# Patient Record
Sex: FEMALE | Race: OTHER | HISPANIC OR LATINO | URBAN - METROPOLITAN AREA
[De-identification: names, ages, dates, MRNs, and addresses within clinical notes are randomized per-mention and may not be internally consistent; named-entity substitution may affect disease eponyms.]

---

## 2023-11-01 ENCOUNTER — INPATIENT (INPATIENT)
Facility: HOSPITAL | Age: 23
LOS: 2 days | Discharge: HOME | DRG: 831 | End: 2023-11-04
Attending: OBSTETRICS & GYNECOLOGY | Admitting: OBSTETRICS & GYNECOLOGY
Payer: MEDICAID

## 2023-11-01 VITALS — WEIGHT: 218.92 LBS | HEIGHT: 64 IN

## 2023-11-01 DIAGNOSIS — O26.899 OTHER SPECIFIED PREGNANCY RELATED CONDITIONS, UNSPECIFIED TRIMESTER: ICD-10-CM

## 2023-11-01 DIAGNOSIS — Z3A.00 WEEKS OF GESTATION OF PREGNANCY NOT SPECIFIED: ICD-10-CM

## 2023-11-01 DIAGNOSIS — Z34.80 ENCOUNTER FOR SUPERVISION OF OTHER NORMAL PREGNANCY, UNSPECIFIED TRIMESTER: ICD-10-CM

## 2023-11-01 LAB
ALBUMIN SERPL ELPH-MCNC: 2.8 G/DL — LOW (ref 3.5–5)
ALBUMIN SERPL ELPH-MCNC: 2.8 G/DL — LOW (ref 3.5–5)
ALP SERPL-CCNC: 217 U/L — HIGH (ref 40–120)
ALP SERPL-CCNC: 217 U/L — HIGH (ref 40–120)
ALT FLD-CCNC: 68 U/L DA — HIGH (ref 10–60)
ALT FLD-CCNC: 68 U/L DA — HIGH (ref 10–60)
AMYLASE P1 CFR SERPL: 1265 U/L — HIGH (ref 25–115)
AMYLASE P1 CFR SERPL: 1265 U/L — HIGH (ref 25–115)
AMYLASE P1 CFR SERPL: >1302 U/L — HIGH (ref 25–115)
AMYLASE P1 CFR SERPL: >1302 U/L — HIGH (ref 25–115)
ANION GAP SERPL CALC-SCNC: 6 MMOL/L — SIGNIFICANT CHANGE UP (ref 5–17)
ANION GAP SERPL CALC-SCNC: 6 MMOL/L — SIGNIFICANT CHANGE UP (ref 5–17)
APTT BLD: 31 SEC — SIGNIFICANT CHANGE UP (ref 24.5–35.6)
APTT BLD: 31 SEC — SIGNIFICANT CHANGE UP (ref 24.5–35.6)
AST SERPL-CCNC: 60 U/L — HIGH (ref 10–40)
AST SERPL-CCNC: 60 U/L — HIGH (ref 10–40)
BASOPHILS # BLD AUTO: 0.01 K/UL — SIGNIFICANT CHANGE UP (ref 0–0.2)
BASOPHILS # BLD AUTO: 0.01 K/UL — SIGNIFICANT CHANGE UP (ref 0–0.2)
BASOPHILS # BLD AUTO: 0.02 K/UL — SIGNIFICANT CHANGE UP (ref 0–0.2)
BASOPHILS # BLD AUTO: 0.02 K/UL — SIGNIFICANT CHANGE UP (ref 0–0.2)
BASOPHILS NFR BLD AUTO: 0.1 % — SIGNIFICANT CHANGE UP (ref 0–2)
BASOPHILS NFR BLD AUTO: 0.1 % — SIGNIFICANT CHANGE UP (ref 0–2)
BASOPHILS NFR BLD AUTO: 0.2 % — SIGNIFICANT CHANGE UP (ref 0–2)
BASOPHILS NFR BLD AUTO: 0.2 % — SIGNIFICANT CHANGE UP (ref 0–2)
BILIRUB SERPL-MCNC: 0.8 MG/DL — SIGNIFICANT CHANGE UP (ref 0.2–1.2)
BILIRUB SERPL-MCNC: 0.8 MG/DL — SIGNIFICANT CHANGE UP (ref 0.2–1.2)
BUN SERPL-MCNC: 8 MG/DL — SIGNIFICANT CHANGE UP (ref 7–18)
BUN SERPL-MCNC: 8 MG/DL — SIGNIFICANT CHANGE UP (ref 7–18)
CALCIUM SERPL-MCNC: 9.2 MG/DL — SIGNIFICANT CHANGE UP (ref 8.4–10.5)
CALCIUM SERPL-MCNC: 9.2 MG/DL — SIGNIFICANT CHANGE UP (ref 8.4–10.5)
CHLORIDE SERPL-SCNC: 109 MMOL/L — HIGH (ref 96–108)
CHLORIDE SERPL-SCNC: 109 MMOL/L — HIGH (ref 96–108)
CO2 SERPL-SCNC: 24 MMOL/L — SIGNIFICANT CHANGE UP (ref 22–31)
CO2 SERPL-SCNC: 24 MMOL/L — SIGNIFICANT CHANGE UP (ref 22–31)
CREAT SERPL-MCNC: 0.49 MG/DL — LOW (ref 0.5–1.3)
CREAT SERPL-MCNC: 0.49 MG/DL — LOW (ref 0.5–1.3)
EGFR: 136 ML/MIN/1.73M2 — SIGNIFICANT CHANGE UP
EGFR: 136 ML/MIN/1.73M2 — SIGNIFICANT CHANGE UP
EOSINOPHIL # BLD AUTO: 0 K/UL — SIGNIFICANT CHANGE UP (ref 0–0.5)
EOSINOPHIL NFR BLD AUTO: 0 % — SIGNIFICANT CHANGE UP (ref 0–6)
FIBRINOGEN PPP-MCNC: 594 MG/DL — HIGH (ref 200–475)
FIBRINOGEN PPP-MCNC: 594 MG/DL — HIGH (ref 200–475)
GLUCOSE SERPL-MCNC: 125 MG/DL — HIGH (ref 70–99)
GLUCOSE SERPL-MCNC: 125 MG/DL — HIGH (ref 70–99)
HBV SURFACE AG SERPL QL IA: SIGNIFICANT CHANGE UP
HBV SURFACE AG SERPL QL IA: SIGNIFICANT CHANGE UP
HCT VFR BLD CALC: 35 % — SIGNIFICANT CHANGE UP (ref 34.5–45)
HCT VFR BLD CALC: 35 % — SIGNIFICANT CHANGE UP (ref 34.5–45)
HCT VFR BLD CALC: 39.1 % — SIGNIFICANT CHANGE UP (ref 34.5–45)
HCT VFR BLD CALC: 39.1 % — SIGNIFICANT CHANGE UP (ref 34.5–45)
HGB BLD-MCNC: 11.8 G/DL — SIGNIFICANT CHANGE UP (ref 11.5–15.5)
HGB BLD-MCNC: 11.8 G/DL — SIGNIFICANT CHANGE UP (ref 11.5–15.5)
HGB BLD-MCNC: 13.5 G/DL — SIGNIFICANT CHANGE UP (ref 11.5–15.5)
HGB BLD-MCNC: 13.5 G/DL — SIGNIFICANT CHANGE UP (ref 11.5–15.5)
HIV 1 & 2 AB SERPL IA.RAPID: SIGNIFICANT CHANGE UP
HIV 1 & 2 AB SERPL IA.RAPID: SIGNIFICANT CHANGE UP
IMM GRANULOCYTES NFR BLD AUTO: 0.3 % — SIGNIFICANT CHANGE UP (ref 0–0.9)
IMM GRANULOCYTES NFR BLD AUTO: 0.3 % — SIGNIFICANT CHANGE UP (ref 0–0.9)
IMM GRANULOCYTES NFR BLD AUTO: 0.6 % — SIGNIFICANT CHANGE UP (ref 0–0.9)
IMM GRANULOCYTES NFR BLD AUTO: 0.6 % — SIGNIFICANT CHANGE UP (ref 0–0.9)
INR BLD: 0.94 RATIO — SIGNIFICANT CHANGE UP (ref 0.85–1.18)
INR BLD: 0.94 RATIO — SIGNIFICANT CHANGE UP (ref 0.85–1.18)
LIDOCAIN IGE QN: 1973 U/L — HIGH (ref 13–75)
LIDOCAIN IGE QN: 1973 U/L — HIGH (ref 13–75)
LIDOCAIN IGE QN: >5000 U/L — HIGH (ref 13–75)
LIDOCAIN IGE QN: >5000 U/L — HIGH (ref 13–75)
LYMPHOCYTES # BLD AUTO: 0.85 K/UL — LOW (ref 1–3.3)
LYMPHOCYTES # BLD AUTO: 0.85 K/UL — LOW (ref 1–3.3)
LYMPHOCYTES # BLD AUTO: 1.02 K/UL — SIGNIFICANT CHANGE UP (ref 1–3.3)
LYMPHOCYTES # BLD AUTO: 1.02 K/UL — SIGNIFICANT CHANGE UP (ref 1–3.3)
LYMPHOCYTES # BLD AUTO: 9.5 % — LOW (ref 13–44)
LYMPHOCYTES # BLD AUTO: 9.5 % — LOW (ref 13–44)
LYMPHOCYTES # BLD AUTO: 9.9 % — LOW (ref 13–44)
LYMPHOCYTES # BLD AUTO: 9.9 % — LOW (ref 13–44)
MCHC RBC-ENTMCNC: 27.6 PG — SIGNIFICANT CHANGE UP (ref 27–34)
MCHC RBC-ENTMCNC: 27.6 PG — SIGNIFICANT CHANGE UP (ref 27–34)
MCHC RBC-ENTMCNC: 28 PG — SIGNIFICANT CHANGE UP (ref 27–34)
MCHC RBC-ENTMCNC: 28 PG — SIGNIFICANT CHANGE UP (ref 27–34)
MCHC RBC-ENTMCNC: 33.7 GM/DL — SIGNIFICANT CHANGE UP (ref 32–36)
MCHC RBC-ENTMCNC: 33.7 GM/DL — SIGNIFICANT CHANGE UP (ref 32–36)
MCHC RBC-ENTMCNC: 34.5 GM/DL — SIGNIFICANT CHANGE UP (ref 32–36)
MCHC RBC-ENTMCNC: 34.5 GM/DL — SIGNIFICANT CHANGE UP (ref 32–36)
MCV RBC AUTO: 81 FL — SIGNIFICANT CHANGE UP (ref 80–100)
MCV RBC AUTO: 81 FL — SIGNIFICANT CHANGE UP (ref 80–100)
MCV RBC AUTO: 81.8 FL — SIGNIFICANT CHANGE UP (ref 80–100)
MCV RBC AUTO: 81.8 FL — SIGNIFICANT CHANGE UP (ref 80–100)
MONOCYTES # BLD AUTO: 0.34 K/UL — SIGNIFICANT CHANGE UP (ref 0–0.9)
MONOCYTES # BLD AUTO: 0.34 K/UL — SIGNIFICANT CHANGE UP (ref 0–0.9)
MONOCYTES # BLD AUTO: 0.47 K/UL — SIGNIFICANT CHANGE UP (ref 0–0.9)
MONOCYTES # BLD AUTO: 0.47 K/UL — SIGNIFICANT CHANGE UP (ref 0–0.9)
MONOCYTES NFR BLD AUTO: 4 % — SIGNIFICANT CHANGE UP (ref 2–14)
MONOCYTES NFR BLD AUTO: 4 % — SIGNIFICANT CHANGE UP (ref 2–14)
MONOCYTES NFR BLD AUTO: 4.4 % — SIGNIFICANT CHANGE UP (ref 2–14)
MONOCYTES NFR BLD AUTO: 4.4 % — SIGNIFICANT CHANGE UP (ref 2–14)
NEUTROPHILS # BLD AUTO: 7.37 K/UL — SIGNIFICANT CHANGE UP (ref 1.8–7.4)
NEUTROPHILS # BLD AUTO: 7.37 K/UL — SIGNIFICANT CHANGE UP (ref 1.8–7.4)
NEUTROPHILS # BLD AUTO: 9.2 K/UL — HIGH (ref 1.8–7.4)
NEUTROPHILS # BLD AUTO: 9.2 K/UL — HIGH (ref 1.8–7.4)
NEUTROPHILS NFR BLD AUTO: 85.3 % — HIGH (ref 43–77)
NEUTROPHILS NFR BLD AUTO: 85.3 % — HIGH (ref 43–77)
NEUTROPHILS NFR BLD AUTO: 85.7 % — HIGH (ref 43–77)
NEUTROPHILS NFR BLD AUTO: 85.7 % — HIGH (ref 43–77)
NRBC # BLD: 0 /100 WBCS — SIGNIFICANT CHANGE UP (ref 0–0)
PLATELET # BLD AUTO: 264 K/UL — SIGNIFICANT CHANGE UP (ref 150–400)
PLATELET # BLD AUTO: 264 K/UL — SIGNIFICANT CHANGE UP (ref 150–400)
PLATELET # BLD AUTO: 290 K/UL — SIGNIFICANT CHANGE UP (ref 150–400)
PLATELET # BLD AUTO: 290 K/UL — SIGNIFICANT CHANGE UP (ref 150–400)
POTASSIUM SERPL-MCNC: 4 MMOL/L — SIGNIFICANT CHANGE UP (ref 3.5–5.3)
POTASSIUM SERPL-MCNC: 4 MMOL/L — SIGNIFICANT CHANGE UP (ref 3.5–5.3)
POTASSIUM SERPL-SCNC: 4 MMOL/L — SIGNIFICANT CHANGE UP (ref 3.5–5.3)
POTASSIUM SERPL-SCNC: 4 MMOL/L — SIGNIFICANT CHANGE UP (ref 3.5–5.3)
PROT SERPL-MCNC: 7.5 G/DL — SIGNIFICANT CHANGE UP (ref 6–8.3)
PROT SERPL-MCNC: 7.5 G/DL — SIGNIFICANT CHANGE UP (ref 6–8.3)
PROTHROM AB SERPL-ACNC: 10.7 SEC — SIGNIFICANT CHANGE UP (ref 9.5–13)
PROTHROM AB SERPL-ACNC: 10.7 SEC — SIGNIFICANT CHANGE UP (ref 9.5–13)
RBC # BLD: 4.28 M/UL — SIGNIFICANT CHANGE UP (ref 3.8–5.2)
RBC # BLD: 4.28 M/UL — SIGNIFICANT CHANGE UP (ref 3.8–5.2)
RBC # BLD: 4.83 M/UL — SIGNIFICANT CHANGE UP (ref 3.8–5.2)
RBC # BLD: 4.83 M/UL — SIGNIFICANT CHANGE UP (ref 3.8–5.2)
RBC # FLD: 13.7 % — SIGNIFICANT CHANGE UP (ref 10.3–14.5)
RBC # FLD: 13.7 % — SIGNIFICANT CHANGE UP (ref 10.3–14.5)
RBC # FLD: 13.8 % — SIGNIFICANT CHANGE UP (ref 10.3–14.5)
RBC # FLD: 13.8 % — SIGNIFICANT CHANGE UP (ref 10.3–14.5)
SODIUM SERPL-SCNC: 139 MMOL/L — SIGNIFICANT CHANGE UP (ref 135–145)
SODIUM SERPL-SCNC: 139 MMOL/L — SIGNIFICANT CHANGE UP (ref 135–145)
WBC # BLD: 10.77 K/UL — HIGH (ref 3.8–10.5)
WBC # BLD: 10.77 K/UL — HIGH (ref 3.8–10.5)
WBC # BLD: 8.6 K/UL — SIGNIFICANT CHANGE UP (ref 3.8–10.5)
WBC # BLD: 8.6 K/UL — SIGNIFICANT CHANGE UP (ref 3.8–10.5)
WBC # FLD AUTO: 10.77 K/UL — HIGH (ref 3.8–10.5)
WBC # FLD AUTO: 10.77 K/UL — HIGH (ref 3.8–10.5)
WBC # FLD AUTO: 8.6 K/UL — SIGNIFICANT CHANGE UP (ref 3.8–10.5)
WBC # FLD AUTO: 8.6 K/UL — SIGNIFICANT CHANGE UP (ref 3.8–10.5)

## 2023-11-01 PROCEDURE — 76818 FETAL BIOPHYS PROFILE W/NST: CPT | Mod: 26

## 2023-11-01 PROCEDURE — 76815 OB US LIMITED FETUS(S): CPT | Mod: 26

## 2023-11-01 PROCEDURE — 76705 ECHO EXAM OF ABDOMEN: CPT | Mod: 26

## 2023-11-01 RX ORDER — SODIUM CHLORIDE 9 MG/ML
1000 INJECTION, SOLUTION INTRAVENOUS ONCE
Refills: 0 | Status: COMPLETED | OUTPATIENT
Start: 2023-11-01 | End: 2023-11-01

## 2023-11-01 RX ORDER — MORPHINE SULFATE 50 MG/1
4 CAPSULE, EXTENDED RELEASE ORAL ONCE
Refills: 0 | Status: DISCONTINUED | OUTPATIENT
Start: 2023-11-01 | End: 2023-11-03

## 2023-11-01 RX ORDER — ONDANSETRON 8 MG/1
4 TABLET, FILM COATED ORAL ONCE
Refills: 0 | Status: DISCONTINUED | OUTPATIENT
Start: 2023-11-01 | End: 2023-11-02

## 2023-11-01 RX ORDER — ACETAMINOPHEN 500 MG
1000 TABLET ORAL ONCE
Refills: 0 | Status: COMPLETED | OUTPATIENT
Start: 2023-11-01 | End: 2023-11-01

## 2023-11-01 RX ORDER — ONDANSETRON 8 MG/1
4 TABLET, FILM COATED ORAL ONCE
Refills: 0 | Status: COMPLETED | OUTPATIENT
Start: 2023-11-01 | End: 2023-11-01

## 2023-11-01 RX ORDER — CHLORHEXIDINE GLUCONATE 213 G/1000ML
1 SOLUTION TOPICAL DAILY
Refills: 0 | Status: DISCONTINUED | OUTPATIENT
Start: 2023-11-01 | End: 2023-11-01

## 2023-11-01 RX ORDER — SODIUM CHLORIDE 9 MG/ML
1000 INJECTION, SOLUTION INTRAVENOUS
Refills: 0 | Status: DISCONTINUED | OUTPATIENT
Start: 2023-11-01 | End: 2023-11-01

## 2023-11-01 RX ADMIN — ONDANSETRON 4 MILLIGRAM(S): 8 TABLET, FILM COATED ORAL at 14:30

## 2023-11-01 RX ADMIN — SODIUM CHLORIDE 150 MILLILITER(S): 9 INJECTION, SOLUTION INTRAVENOUS at 21:00

## 2023-11-01 RX ADMIN — Medication 1000 MILLIGRAM(S): at 15:00

## 2023-11-01 RX ADMIN — SODIUM CHLORIDE 2000 MILLILITER(S): 9 INJECTION, SOLUTION INTRAVENOUS at 14:05

## 2023-11-01 RX ADMIN — SODIUM CHLORIDE 1000 MILLILITER(S): 9 INJECTION, SOLUTION INTRAVENOUS at 19:50

## 2023-11-01 RX ADMIN — Medication 400 MILLIGRAM(S): at 14:30

## 2023-11-01 NOTE — CHART NOTE - NSCHARTNOTEFT_GEN_A_CORE
[facility] Telephone Brief Consult Note:    Patient is a 24 YO  at 34w1d with known gallstones (diagnosed one month ago) presenting with nausea/vomiting and abdominal pain and found to have gallstone pancreatitis. Per provider report, nausea/vomiting and abdominal pain improved. Normotensive and nontachycardic. Labs notable for amylase >1302, lipase >5000, and AST/ALT 60/68. Abdominal ultrasound demonstrates cholelithiasis but no cholecystitis. Patient seen by general surgery and no indication for surgical intervention at this time. Category I fetal heart tracing (baseline 125 bpm, moderate variability, positive accelerations, no decelerations) and contractions q3-4 minutes on tocometry. SVE Long/closed on initial exam at 2:30 PM    Recommend:  -Repeat cervical exam to evaluate for  labor in setting of  contractions  -Given symptomatic improvement and clinical stability, defer betamethasone at this time  -NPO  -IVF hydration: bolus total of 10 ml/kg and then maintenance of 1.5 mL/kg/hr  -Morphine as needed for pain  -GI or Medicine consult  -Continuous EFM/[place]  -Repeat CBC, CMP, amylase and lipase at 7:30 PM    Dixie Ball MD  [facility] Fellow  D/w Dr. Marie [facility] Telephone Brief Consult Note:    Patient is a 24 YO  at 34w1d with known gallstones (diagnosed one month ago) presenting with nausea/vomiting and abdominal pain and found to have gallstone pancreatitis. Per provider report, nausea/vomiting and abdominal pain improved. Normotensive and nontachycardic. Labs notable for amylase >1302, lipase >5000, and AST/ALT 60/68. Abdominal ultrasound demonstrates cholelithiasis but no cholecystitis. Patient seen by general surgery and no indication for surgical intervention at this time. Category I fetal heart tracing (baseline 125 bpm, moderate variability, positive accelerations, no decelerations) and contractions q3-4 minutes on tocometry. SVE Long/closed on initial exam at 2:30 PM    Recommend:  -Repeat cervical exam to evaluate for  labor in setting of  contractions  -Given symptomatic improvement and clinical stability, defer betamethasone at this time  -NPO  -IVF hydration: bolus total of 10 ml/kg and then maintenance of 1.5 mL/kg/hr  -Morphine as needed for pain  -GI or Medicine consult  -Continuous EFM/[place]  -Repeat CBC, CMP, amylase and lipase at 7:30 PM    Dixie Ball MD  [facility] Fellow  D/w Dr. Marie    Addendum 9:45 PM:  Per provider, patient without complaints and reports hunger. Vital signs within normal limits. Labs improving amylase >1302 >>1265, lipase >5000 >> 1973, and AST/ALT 60/68>>49/66. Plan as above with following changes: switch to BID NST, repeat CBC/CMP/amylase/lipase in the morning.     Dixie Ball MD  M Fellow [facility] Telephone Brief Consult Note:    Patient is a 24 YO  at 34w1d with known gallstones (diagnosed one month ago) presenting with nausea/vomiting and abdominal pain and found to have gallstone pancreatitis. Per provider report, nausea/vomiting and abdominal pain improved. Normotensive and nontachycardic. Labs notable for amylase >1302, lipase >5000, and AST/ALT 60/68. Abdominal ultrasound demonstrates cholelithiasis but no cholecystitis. Patient seen by general surgery and no indication for surgical intervention at this time. Category I fetal heart tracing (baseline 125 bpm, moderate variability, positive accelerations, no decelerations) and contractions q3-4 minutes on tocometry. SVE Long/closed on initial exam at 2:30 PM    Recommend:  -Repeat cervical exam to evaluate for  labor in setting of  contractions  -Given symptomatic improvement and clinical stability, defer betamethasone at this time  -NPO  -IVF hydration: bolus total of 10 ml/kg and then maintenance of 1.5 mL/kg/hr  -Morphine as needed for pain  -GI or Medicine consult  -Continuous EFM/[place]  -Repeat CBC, CMP, amylase and lipase at 7:30 PM    Dixie Ball MD  [facility] Fellow  D/w Dr. Marie    Addendum 9:45 PM:  Per provider, patient without complaints and reports hunger. Vital signs within normal limits. Labs improving amylase >1302 >>1265, lipase >5000 >> 1973, and AST/ALT 60/68>>49/66. Plan as above with following changes: switch to BID NST, repeat CBC/CMP/amylase/lipase in the morning.     Dixie Ball MD  [facility] Fellow    [facility] ATTENDING:   Agree with the note above by Dr. Ball. Patient with suspected transient gallstone pancreatitis, now with clinical improvement likely in the setting of a passed stone. To continue supportive care with fluid management with GI following.   SHANTEL Marie MD

## 2023-11-02 DIAGNOSIS — K85.10 BILIARY ACUTE PANCREATITIS WITHOUT NECROSIS OR INFECTION: ICD-10-CM

## 2023-11-02 LAB
ALBUMIN SERPL ELPH-MCNC: 2.2 G/DL — LOW (ref 3.5–5)
ALBUMIN SERPL ELPH-MCNC: 2.2 G/DL — LOW (ref 3.5–5)
ALP SERPL-CCNC: 168 U/L — HIGH (ref 40–120)
ALP SERPL-CCNC: 168 U/L — HIGH (ref 40–120)
ALT FLD-CCNC: 59 U/L DA — SIGNIFICANT CHANGE UP (ref 10–60)
ALT FLD-CCNC: 59 U/L DA — SIGNIFICANT CHANGE UP (ref 10–60)
AMYLASE P1 CFR SERPL: 419 U/L — HIGH (ref 25–115)
AMYLASE P1 CFR SERPL: 419 U/L — HIGH (ref 25–115)
ANION GAP SERPL CALC-SCNC: 9 MMOL/L — SIGNIFICANT CHANGE UP (ref 5–17)
ANION GAP SERPL CALC-SCNC: 9 MMOL/L — SIGNIFICANT CHANGE UP (ref 5–17)
AST SERPL-CCNC: 40 U/L — SIGNIFICANT CHANGE UP (ref 10–40)
AST SERPL-CCNC: 40 U/L — SIGNIFICANT CHANGE UP (ref 10–40)
BASOPHILS # BLD AUTO: 0.03 K/UL — SIGNIFICANT CHANGE UP (ref 0–0.2)
BASOPHILS # BLD AUTO: 0.03 K/UL — SIGNIFICANT CHANGE UP (ref 0–0.2)
BASOPHILS NFR BLD AUTO: 0.5 % — SIGNIFICANT CHANGE UP (ref 0–2)
BASOPHILS NFR BLD AUTO: 0.5 % — SIGNIFICANT CHANGE UP (ref 0–2)
BILIRUB SERPL-MCNC: 0.5 MG/DL — SIGNIFICANT CHANGE UP (ref 0.2–1.2)
BILIRUB SERPL-MCNC: 0.5 MG/DL — SIGNIFICANT CHANGE UP (ref 0.2–1.2)
BUN SERPL-MCNC: 7 MG/DL — SIGNIFICANT CHANGE UP (ref 7–18)
BUN SERPL-MCNC: 7 MG/DL — SIGNIFICANT CHANGE UP (ref 7–18)
CALCIUM SERPL-MCNC: 8.2 MG/DL — LOW (ref 8.4–10.5)
CALCIUM SERPL-MCNC: 8.2 MG/DL — LOW (ref 8.4–10.5)
CHLORIDE SERPL-SCNC: 106 MMOL/L — SIGNIFICANT CHANGE UP (ref 96–108)
CHLORIDE SERPL-SCNC: 106 MMOL/L — SIGNIFICANT CHANGE UP (ref 96–108)
CO2 SERPL-SCNC: 24 MMOL/L — SIGNIFICANT CHANGE UP (ref 22–31)
CO2 SERPL-SCNC: 24 MMOL/L — SIGNIFICANT CHANGE UP (ref 22–31)
CREAT SERPL-MCNC: 0.37 MG/DL — LOW (ref 0.5–1.3)
CREAT SERPL-MCNC: 0.37 MG/DL — LOW (ref 0.5–1.3)
EGFR: 145 ML/MIN/1.73M2 — SIGNIFICANT CHANGE UP
EGFR: 145 ML/MIN/1.73M2 — SIGNIFICANT CHANGE UP
EOSINOPHIL # BLD AUTO: 0.02 K/UL — SIGNIFICANT CHANGE UP (ref 0–0.5)
EOSINOPHIL # BLD AUTO: 0.02 K/UL — SIGNIFICANT CHANGE UP (ref 0–0.5)
EOSINOPHIL NFR BLD AUTO: 0.3 % — SIGNIFICANT CHANGE UP (ref 0–6)
EOSINOPHIL NFR BLD AUTO: 0.3 % — SIGNIFICANT CHANGE UP (ref 0–6)
GLUCOSE SERPL-MCNC: 81 MG/DL — SIGNIFICANT CHANGE UP (ref 70–99)
GLUCOSE SERPL-MCNC: 81 MG/DL — SIGNIFICANT CHANGE UP (ref 70–99)
HCT VFR BLD CALC: 30.2 % — LOW (ref 34.5–45)
HCT VFR BLD CALC: 30.2 % — LOW (ref 34.5–45)
HGB BLD-MCNC: 10.5 G/DL — LOW (ref 11.5–15.5)
HGB BLD-MCNC: 10.5 G/DL — LOW (ref 11.5–15.5)
HIV 1+2 AB+HIV1 P24 AG SERPL QL IA: SIGNIFICANT CHANGE UP
HIV 1+2 AB+HIV1 P24 AG SERPL QL IA: SIGNIFICANT CHANGE UP
IMM GRANULOCYTES NFR BLD AUTO: 0.2 % — SIGNIFICANT CHANGE UP (ref 0–0.9)
IMM GRANULOCYTES NFR BLD AUTO: 0.2 % — SIGNIFICANT CHANGE UP (ref 0–0.9)
LIDOCAIN IGE QN: 494 U/L — HIGH (ref 13–75)
LIDOCAIN IGE QN: 494 U/L — HIGH (ref 13–75)
LYMPHOCYTES # BLD AUTO: 1.27 K/UL — SIGNIFICANT CHANGE UP (ref 1–3.3)
LYMPHOCYTES # BLD AUTO: 1.27 K/UL — SIGNIFICANT CHANGE UP (ref 1–3.3)
LYMPHOCYTES # BLD AUTO: 21.1 % — SIGNIFICANT CHANGE UP (ref 13–44)
LYMPHOCYTES # BLD AUTO: 21.1 % — SIGNIFICANT CHANGE UP (ref 13–44)
MCHC RBC-ENTMCNC: 27.8 PG — SIGNIFICANT CHANGE UP (ref 27–34)
MCHC RBC-ENTMCNC: 27.8 PG — SIGNIFICANT CHANGE UP (ref 27–34)
MCHC RBC-ENTMCNC: 34.8 GM/DL — SIGNIFICANT CHANGE UP (ref 32–36)
MCHC RBC-ENTMCNC: 34.8 GM/DL — SIGNIFICANT CHANGE UP (ref 32–36)
MCV RBC AUTO: 79.9 FL — LOW (ref 80–100)
MCV RBC AUTO: 79.9 FL — LOW (ref 80–100)
MONOCYTES # BLD AUTO: 0.39 K/UL — SIGNIFICANT CHANGE UP (ref 0–0.9)
MONOCYTES # BLD AUTO: 0.39 K/UL — SIGNIFICANT CHANGE UP (ref 0–0.9)
MONOCYTES NFR BLD AUTO: 6.5 % — SIGNIFICANT CHANGE UP (ref 2–14)
MONOCYTES NFR BLD AUTO: 6.5 % — SIGNIFICANT CHANGE UP (ref 2–14)
NEUTROPHILS # BLD AUTO: 4.29 K/UL — SIGNIFICANT CHANGE UP (ref 1.8–7.4)
NEUTROPHILS # BLD AUTO: 4.29 K/UL — SIGNIFICANT CHANGE UP (ref 1.8–7.4)
NEUTROPHILS NFR BLD AUTO: 71.4 % — SIGNIFICANT CHANGE UP (ref 43–77)
NEUTROPHILS NFR BLD AUTO: 71.4 % — SIGNIFICANT CHANGE UP (ref 43–77)
NRBC # BLD: 0 /100 WBCS — SIGNIFICANT CHANGE UP (ref 0–0)
NRBC # BLD: 0 /100 WBCS — SIGNIFICANT CHANGE UP (ref 0–0)
PLATELET # BLD AUTO: 239 K/UL — SIGNIFICANT CHANGE UP (ref 150–400)
PLATELET # BLD AUTO: 239 K/UL — SIGNIFICANT CHANGE UP (ref 150–400)
POTASSIUM SERPL-MCNC: 3.4 MMOL/L — LOW (ref 3.5–5.3)
POTASSIUM SERPL-MCNC: 3.4 MMOL/L — LOW (ref 3.5–5.3)
POTASSIUM SERPL-SCNC: 3.4 MMOL/L — LOW (ref 3.5–5.3)
POTASSIUM SERPL-SCNC: 3.4 MMOL/L — LOW (ref 3.5–5.3)
PROT SERPL-MCNC: 5.9 G/DL — LOW (ref 6–8.3)
PROT SERPL-MCNC: 5.9 G/DL — LOW (ref 6–8.3)
RBC # BLD: 3.78 M/UL — LOW (ref 3.8–5.2)
RBC # BLD: 3.78 M/UL — LOW (ref 3.8–5.2)
RBC # FLD: 13.8 % — SIGNIFICANT CHANGE UP (ref 10.3–14.5)
RBC # FLD: 13.8 % — SIGNIFICANT CHANGE UP (ref 10.3–14.5)
RUBV IGG SER-ACNC: 2.1 INDEX — SIGNIFICANT CHANGE UP
RUBV IGG SER-ACNC: 2.1 INDEX — SIGNIFICANT CHANGE UP
RUBV IGG SER-IMP: POSITIVE — SIGNIFICANT CHANGE UP
RUBV IGG SER-IMP: POSITIVE — SIGNIFICANT CHANGE UP
SODIUM SERPL-SCNC: 139 MMOL/L — SIGNIFICANT CHANGE UP (ref 135–145)
SODIUM SERPL-SCNC: 139 MMOL/L — SIGNIFICANT CHANGE UP (ref 135–145)
T PALLIDUM AB TITR SER: NEGATIVE — SIGNIFICANT CHANGE UP
T PALLIDUM AB TITR SER: NEGATIVE — SIGNIFICANT CHANGE UP
WBC # BLD: 6.01 K/UL — SIGNIFICANT CHANGE UP (ref 3.8–10.5)
WBC # BLD: 6.01 K/UL — SIGNIFICANT CHANGE UP (ref 3.8–10.5)
WBC # FLD AUTO: 6.01 K/UL — SIGNIFICANT CHANGE UP (ref 3.8–10.5)
WBC # FLD AUTO: 6.01 K/UL — SIGNIFICANT CHANGE UP (ref 3.8–10.5)

## 2023-11-02 RX ORDER — ONDANSETRON 8 MG/1
4 TABLET, FILM COATED ORAL EVERY 4 HOURS
Refills: 0 | Status: DISCONTINUED | OUTPATIENT
Start: 2023-11-02 | End: 2023-11-04

## 2023-11-02 RX ORDER — FAMOTIDINE 10 MG/ML
20 INJECTION INTRAVENOUS
Refills: 0 | Status: DISCONTINUED | OUTPATIENT
Start: 2023-11-02 | End: 2023-11-02

## 2023-11-02 RX ORDER — SODIUM CHLORIDE 9 MG/ML
1000 INJECTION, SOLUTION INTRAVENOUS
Refills: 0 | Status: DISCONTINUED | OUTPATIENT
Start: 2023-11-02 | End: 2023-11-03

## 2023-11-02 RX ORDER — FAMOTIDINE 10 MG/ML
20 INJECTION INTRAVENOUS EVERY 12 HOURS
Refills: 0 | Status: DISCONTINUED | OUTPATIENT
Start: 2023-11-02 | End: 2023-11-04

## 2023-11-02 RX ORDER — POTASSIUM CHLORIDE 20 MEQ
10 PACKET (EA) ORAL
Refills: 0 | Status: COMPLETED | OUTPATIENT
Start: 2023-11-02 | End: 2023-11-02

## 2023-11-02 RX ADMIN — FAMOTIDINE 20 MILLIGRAM(S): 10 INJECTION INTRAVENOUS at 18:00

## 2023-11-02 RX ADMIN — Medication 100 MILLIEQUIVALENT(S): at 13:04

## 2023-11-02 RX ADMIN — SODIUM CHLORIDE 150 MILLILITER(S): 9 INJECTION, SOLUTION INTRAVENOUS at 21:40

## 2023-11-02 RX ADMIN — Medication 100 MILLIEQUIVALENT(S): at 12:00

## 2023-11-02 RX ADMIN — Medication 100 MILLIEQUIVALENT(S): at 15:00

## 2023-11-02 NOTE — PROGRESS NOTE ADULT - SUBJECTIVE AND OBJECTIVE BOX
Pt seen at bedside c/o epigastric pain, persistent but improved since admission. Pt states +fm, no vb, no rom, no ctx, no fever, or chills, no cp; no sob; no n/v/d/c, no h/a, blurry vision,, no dysuria, urgency or frequency. Tolerating regular diet, voiding without difficulty.     Vital Signs Last 24 Hrs  T(C): 36.7 (2023 06:02), Max: 37.2 (2023 02:02)  T(F): 98 (2023 06:02), Max: 98.9 (2023 02:02)  HR: 89 (2023 06:02) (72 - 89)  BP: 107/63 (2023 06:02) (107/63 - 118/74)  BP(mean): --  RR: 18 (2023 06:02) (17 - 18)  SpO2: 98% (2023 06:02) (98% - 99%)    Parameters below as of 2023 06:02  Patient On (Oxygen Delivery Method): room air        PE:   Gen: A&Ox3; NAD  Abd: soft/gravid, mild epigastric tenderness, no guarding or rebound   back: no cvat b/l  sse: deferred  sve: defered  ext: no calf pain; edema 1+b/l, dtr's 2+b/l, venodynes in place    Urinalysis Basic - ( 2023 07:07 )    Color: x / Appearance: x / SG: x / pH: x  Gluc: 81 mg/dL / Ketone: x  / Bili: x / Urobili: x   Blood: x / Protein: x / Nitrite: x   Leuk Esterase: x / RBC: x / WBC x   Sq Epi: x / Non Sq Epi: x / Bacteria: x      CBC Full  -  ( 2023 07:07 )  WBC Count : 6.01 K/uL  RBC Count : 3.78 M/uL  Hemoglobin : 10.5 g/dL  Hematocrit : 30.2 %  Platelet Count - Automated : 239 K/uL  Mean Cell Volume : 79.9 fl  Mean Cell Hemoglobin : 27.8 pg  Mean Cell Hemoglobin Concentration : 34.8 gm/dL  Auto Neutrophil # : 4.29 K/uL  Auto Lymphocyte # : 1.27 K/uL  Auto Monocyte # : 0.39 K/uL  Auto Eosinophil # : 0.02 K/uL  Auto Basophil # : 0.03 K/uL  Auto Neutrophil % : 71.4 %  Auto Lymphocyte % : 21.1 %  Auto Monocyte % : 6.5 %  Auto Eosinophil % : 0.3 %  Auto Basophil % : 0.5 %        139  |  106  |  7   ----------------------------<  81  3.4<L>   |  24  |  0.37<L>    Ca    8.2<L>      2023 07:07    TPro  5.9<L>  /  Alb  2.2<L>  /  TBili  0.5  /  DBili  x   /  AST  40  /  ALT  59  /  AlkPhos  168<H>        a/p 24yo  @34.2wks presenting to the ED with severe epigastric pain admitted with gallstone pancreatitis, lipase >5000, amylase 1302; pt stable  - GI consulted pending recommendations  - BPP 23: 8/8, matteo 17.4cm, vtx, efw 3200  am cbc, cmp, amylase and lipase   - NPO diet  - s/w General Surgery  Pt seen at bedside c/o epigastric pain, persistent but improved since admission. Pt states +fm, no vb, no rom, no ctx, no fever, or chills, no cp; no sob; no n/v/d/c, no h/a, blurry vision,, no dysuria, urgency or frequency. Tolerating regular diet, voiding without difficulty.     Vital Signs Last 24 Hrs  T(C): 36.7 (2023 06:02), Max: 37.2 (2023 02:02)  T(F): 98 (2023 06:02), Max: 98.9 (2023 02:02)  HR: 89 (2023 06:02) (72 - 89)  BP: 107/63 (2023 06:02) (107/63 - 118/74)  BP(mean): --  RR: 18 (2023 06:02) (17 - 18)  SpO2: 98% (2023 06:02) (98% - 99%)    Parameters below as of 2023 06:02  Patient On (Oxygen Delivery Method): room air        PE:   Gen: A&Ox3; NAD  Abd: soft/gravid, mild epigastric tenderness, no guarding or rebound   back: no cvat b/l  sse: deferred  sve: defered  ext: no calf pain; edema 1+b/l, dtr's 2+b/l, venodynes in place    Urinalysis Basic - ( 2023 07:07 )    Color: x / Appearance: x / SG: x / pH: x  Gluc: 81 mg/dL / Ketone: x  / Bili: x / Urobili: x   Blood: x / Protein: x / Nitrite: x   Leuk Esterase: x / RBC: x / WBC x   Sq Epi: x / Non Sq Epi: x / Bacteria: x      CBC Full  -  ( 2023 07:07 )  WBC Count : 6.01 K/uL  RBC Count : 3.78 M/uL  Hemoglobin : 10.5 g/dL  Hematocrit : 30.2 %  Platelet Count - Automated : 239 K/uL  Mean Cell Volume : 79.9 fl  Mean Cell Hemoglobin : 27.8 pg  Mean Cell Hemoglobin Concentration : 34.8 gm/dL  Auto Neutrophil # : 4.29 K/uL  Auto Lymphocyte # : 1.27 K/uL  Auto Monocyte # : 0.39 K/uL  Auto Eosinophil # : 0.02 K/uL  Auto Basophil # : 0.03 K/uL  Auto Neutrophil % : 71.4 %  Auto Lymphocyte % : 21.1 %  Auto Monocyte % : 6.5 %  Auto Eosinophil % : 0.3 %  Auto Basophil % : 0.5 %        139  |  106  |  7   ----------------------------<  81  3.4<L>   |  24  |  0.37<L>    Ca    8.2<L>      2023 07:07    TPro  5.9<L>  /  Alb  2.2<L>  /  TBili  0.5  /  DBili  x   /  AST  40  /  ALT  59  /  AlkPhos  168<H>        a/p 22yo  @34.2wks presenting to the ED with severe epigastric pain admitted with gallstone pancreatitis, lipase >5000, amylase 1302; pt stable  - GI consulted pending recommendations  - BPP 23: 8/8, matteo 17.4cm, vtx, efw 3200  - am cbc, cmp, amylase and lipase, hemoglobin a1c  - NPO diet  - d/w General Surgery Dr. Rothman - continue NPO diet; pt is not a surgical candidate at this time  - IVF hydration, multivitamin bag daily   - will discuss with mfm after GI provides their recommendations  - zofran, tylenol, pepcid  - pt seen and evaluated with dr. montgomery

## 2023-11-02 NOTE — PROGRESS NOTE ADULT - SUBJECTIVE AND OBJECTIVE BOX
INTERVAL HPI/OVERNIGHT EVENTS:  Pt stable.   NPO  Patient states abdominal pain is improved.    Vital Signs Last 24 Hrs  T(C): 36.7 (02 Nov 2023 06:02), Max: 37.2 (02 Nov 2023 02:02)  T(F): 98 (02 Nov 2023 06:02), Max: 98.9 (02 Nov 2023 02:02)  HR: 89 (02 Nov 2023 06:02) (72 - 89)  BP: 107/63 (02 Nov 2023 06:02) (107/63 - 118/74)  BP(mean): --  RR: 18 (02 Nov 2023 06:02) (17 - 18)  SpO2: 98% (02 Nov 2023 06:02) (98% - 99%)    Parameters below as of 02 Nov 2023 06:02  Patient On (Oxygen Delivery Method): room air        Physical:  Abdomen: Soft, gravid, mild epigastric tenderness.    I&O's Summary      LABS:                        10.5   6.01  )-----------( 239      ( 02 Nov 2023 07:07 )             30.2             11-02    139  |  106  |  7   ----------------------------<  81  3.4<L>   |  24  |  0.37<L>    Ca    8.2<L>      02 Nov 2023 07:07    TPro  5.9<L>  /  Alb  2.2<L>  /  TBili  0.5  /  DBili  x   /  AST  40  /  ALT  59  /  AlkPhos  168<H>  11-02

## 2023-11-02 NOTE — CHART NOTE - NSCHARTNOTEFT_GEN_A_CORE
fht baseline 135, moderate variability, +accels, no decels   toco q occasional  reviewed with dr. montgomery

## 2023-11-02 NOTE — PROGRESS NOTE ADULT - PROBLEM SELECTOR PLAN 1
a/p 24yo  @34.2wks presenting to the ED with severe epigastric pain admitted with gallstone pancreatitis, lipase >5000, amylase 1302; pt stable  - GI consulted pending recommendations  - BPP 23: , matteo 17.4cm, vtx, efw 3200  - am cbc, cmp, amylase and lipase, hemoglobin a1c  - NPO diet  - d/w General Surgery Dr. Rothman - continue NPO diet; pt is not a surgical candidate at this time  - IVF hydration, multivitamin bag daily   - will discuss with mfm after GI provides their recommendations  - zofran, tylenol, pepcid  - pt seen and evaluated with dr. montgomery

## 2023-11-02 NOTE — PROGRESS NOTE ADULT - SUBJECTIVE AND OBJECTIVE BOX
INTERVAL HPI/OVERNIGHT EVENTS:  Pt resting comfortably.  reports epigastric pain when moving   no N/V    MEDICATIONS  (STANDING):  famotidine Injectable 20 milliGRAM(s) IV Push every 12 hours  lactated ringers. 1000 milliLiter(s) (150 mL/Hr) IV Continuous <Continuous>  prenatal multivitamin 1 Tablet(s) Oral daily    MEDICATIONS  (PRN):  morphine  - Injectable 4 milliGRAM(s) IV Push once PRN Severe Pain (7 - 10)  ondansetron Injectable 4 milliGRAM(s) IV Push every 4 hours PRN Nausea and/or Vomiting      Vital Signs Last 24 Hrs  T(C): 36.7 (02 Nov 2023 06:02), Max: 37.2 (02 Nov 2023 02:02)  T(F): 98 (02 Nov 2023 06:02), Max: 98.9 (02 Nov 2023 02:02)  HR: 89 (02 Nov 2023 06:02) (72 - 89)  BP: 107/63 (02 Nov 2023 06:02) (107/63 - 118/74)  BP(mean): --  RR: 18 (02 Nov 2023 06:02) (17 - 18)  SpO2: 98% (02 Nov 2023 06:02) (98% - 99%)    Parameters below as of 02 Nov 2023 06:02  Patient On (Oxygen Delivery Method): room air        Physical:  General: A&Ox3. NAD.  Respirations: Unlabored  Abdomen: gravid, Soft, Mild tenderness epigastric, No rebound, no guarding, no peritonitis     I&O's Detail      LABS:                        10.5   6.01  )-----------( 239      ( 02 Nov 2023 07:07 )             30.2             11-02    139  |  106  |  7   ----------------------------<  81  3.4<L>   |  24  |  0.37<L>    Ca    8.2<L>      02 Nov 2023 07:07    TPro  5.9<L>  /  Alb  2.2<L>  /  TBili  0.5  /  DBili  x   /  AST  40  /  ALT  59  /  AlkPhos  168<H>  11-02

## 2023-11-03 LAB
A1C WITH ESTIMATED AVERAGE GLUCOSE RESULT: 4.9 % — SIGNIFICANT CHANGE UP (ref 4–5.6)
A1C WITH ESTIMATED AVERAGE GLUCOSE RESULT: 4.9 % — SIGNIFICANT CHANGE UP (ref 4–5.6)
ALBUMIN SERPL ELPH-MCNC: 2.2 G/DL — LOW (ref 3.5–5)
ALBUMIN SERPL ELPH-MCNC: 2.2 G/DL — LOW (ref 3.5–5)
ALP SERPL-CCNC: 179 U/L — HIGH (ref 40–120)
ALP SERPL-CCNC: 179 U/L — HIGH (ref 40–120)
ALT FLD-CCNC: 52 U/L DA — SIGNIFICANT CHANGE UP (ref 10–60)
ALT FLD-CCNC: 52 U/L DA — SIGNIFICANT CHANGE UP (ref 10–60)
AMYLASE P1 CFR SERPL: 108 U/L — SIGNIFICANT CHANGE UP (ref 25–115)
AMYLASE P1 CFR SERPL: 108 U/L — SIGNIFICANT CHANGE UP (ref 25–115)
AMYLASE P1 CFR SERPL: 109 U/L — SIGNIFICANT CHANGE UP (ref 25–115)
AMYLASE P1 CFR SERPL: 109 U/L — SIGNIFICANT CHANGE UP (ref 25–115)
ANION GAP SERPL CALC-SCNC: 12 MMOL/L — SIGNIFICANT CHANGE UP (ref 5–17)
ANION GAP SERPL CALC-SCNC: 12 MMOL/L — SIGNIFICANT CHANGE UP (ref 5–17)
AST SERPL-CCNC: 29 U/L — SIGNIFICANT CHANGE UP (ref 10–40)
AST SERPL-CCNC: 29 U/L — SIGNIFICANT CHANGE UP (ref 10–40)
BASOPHILS # BLD AUTO: 0.05 K/UL — SIGNIFICANT CHANGE UP (ref 0–0.2)
BASOPHILS # BLD AUTO: 0.05 K/UL — SIGNIFICANT CHANGE UP (ref 0–0.2)
BASOPHILS NFR BLD AUTO: 0.8 % — SIGNIFICANT CHANGE UP (ref 0–2)
BASOPHILS NFR BLD AUTO: 0.8 % — SIGNIFICANT CHANGE UP (ref 0–2)
BILIRUB SERPL-MCNC: 0.6 MG/DL — SIGNIFICANT CHANGE UP (ref 0.2–1.2)
BILIRUB SERPL-MCNC: 0.6 MG/DL — SIGNIFICANT CHANGE UP (ref 0.2–1.2)
BUN SERPL-MCNC: 6 MG/DL — LOW (ref 7–18)
BUN SERPL-MCNC: 6 MG/DL — LOW (ref 7–18)
CALCIUM SERPL-MCNC: 8.6 MG/DL — SIGNIFICANT CHANGE UP (ref 8.4–10.5)
CALCIUM SERPL-MCNC: 8.6 MG/DL — SIGNIFICANT CHANGE UP (ref 8.4–10.5)
CHLORIDE SERPL-SCNC: 104 MMOL/L — SIGNIFICANT CHANGE UP (ref 96–108)
CHLORIDE SERPL-SCNC: 104 MMOL/L — SIGNIFICANT CHANGE UP (ref 96–108)
CO2 SERPL-SCNC: 22 MMOL/L — SIGNIFICANT CHANGE UP (ref 22–31)
CO2 SERPL-SCNC: 22 MMOL/L — SIGNIFICANT CHANGE UP (ref 22–31)
CREAT SERPL-MCNC: 0.34 MG/DL — LOW (ref 0.5–1.3)
CREAT SERPL-MCNC: 0.34 MG/DL — LOW (ref 0.5–1.3)
EGFR: 148 ML/MIN/1.73M2 — SIGNIFICANT CHANGE UP
EGFR: 148 ML/MIN/1.73M2 — SIGNIFICANT CHANGE UP
EOSINOPHIL # BLD AUTO: 0.05 K/UL — SIGNIFICANT CHANGE UP (ref 0–0.5)
EOSINOPHIL # BLD AUTO: 0.05 K/UL — SIGNIFICANT CHANGE UP (ref 0–0.5)
EOSINOPHIL NFR BLD AUTO: 0.8 % — SIGNIFICANT CHANGE UP (ref 0–6)
EOSINOPHIL NFR BLD AUTO: 0.8 % — SIGNIFICANT CHANGE UP (ref 0–6)
ESTIMATED AVERAGE GLUCOSE: 94 MG/DL — SIGNIFICANT CHANGE UP (ref 68–114)
ESTIMATED AVERAGE GLUCOSE: 94 MG/DL — SIGNIFICANT CHANGE UP (ref 68–114)
GLUCOSE SERPL-MCNC: 58 MG/DL — LOW (ref 70–99)
GLUCOSE SERPL-MCNC: 58 MG/DL — LOW (ref 70–99)
HCT VFR BLD CALC: 31.8 % — LOW (ref 34.5–45)
HCT VFR BLD CALC: 31.8 % — LOW (ref 34.5–45)
HGB BLD-MCNC: 10.9 G/DL — LOW (ref 11.5–15.5)
HGB BLD-MCNC: 10.9 G/DL — LOW (ref 11.5–15.5)
IMM GRANULOCYTES NFR BLD AUTO: 0.5 % — SIGNIFICANT CHANGE UP (ref 0–0.9)
IMM GRANULOCYTES NFR BLD AUTO: 0.5 % — SIGNIFICANT CHANGE UP (ref 0–0.9)
LIDOCAIN IGE QN: 52 U/L — SIGNIFICANT CHANGE UP (ref 13–75)
LIDOCAIN IGE QN: 52 U/L — SIGNIFICANT CHANGE UP (ref 13–75)
LYMPHOCYTES # BLD AUTO: 1.55 K/UL — SIGNIFICANT CHANGE UP (ref 1–3.3)
LYMPHOCYTES # BLD AUTO: 1.55 K/UL — SIGNIFICANT CHANGE UP (ref 1–3.3)
LYMPHOCYTES # BLD AUTO: 24.3 % — SIGNIFICANT CHANGE UP (ref 13–44)
LYMPHOCYTES # BLD AUTO: 24.3 % — SIGNIFICANT CHANGE UP (ref 13–44)
MCHC RBC-ENTMCNC: 28 PG — SIGNIFICANT CHANGE UP (ref 27–34)
MCHC RBC-ENTMCNC: 28 PG — SIGNIFICANT CHANGE UP (ref 27–34)
MCHC RBC-ENTMCNC: 34.3 GM/DL — SIGNIFICANT CHANGE UP (ref 32–36)
MCHC RBC-ENTMCNC: 34.3 GM/DL — SIGNIFICANT CHANGE UP (ref 32–36)
MCV RBC AUTO: 81.7 FL — SIGNIFICANT CHANGE UP (ref 80–100)
MCV RBC AUTO: 81.7 FL — SIGNIFICANT CHANGE UP (ref 80–100)
MONOCYTES # BLD AUTO: 0.45 K/UL — SIGNIFICANT CHANGE UP (ref 0–0.9)
MONOCYTES # BLD AUTO: 0.45 K/UL — SIGNIFICANT CHANGE UP (ref 0–0.9)
MONOCYTES NFR BLD AUTO: 7 % — SIGNIFICANT CHANGE UP (ref 2–14)
MONOCYTES NFR BLD AUTO: 7 % — SIGNIFICANT CHANGE UP (ref 2–14)
NEUTROPHILS # BLD AUTO: 4.26 K/UL — SIGNIFICANT CHANGE UP (ref 1.8–7.4)
NEUTROPHILS # BLD AUTO: 4.26 K/UL — SIGNIFICANT CHANGE UP (ref 1.8–7.4)
NEUTROPHILS NFR BLD AUTO: 66.6 % — SIGNIFICANT CHANGE UP (ref 43–77)
NEUTROPHILS NFR BLD AUTO: 66.6 % — SIGNIFICANT CHANGE UP (ref 43–77)
NRBC # BLD: 0 /100 WBCS — SIGNIFICANT CHANGE UP (ref 0–0)
NRBC # BLD: 0 /100 WBCS — SIGNIFICANT CHANGE UP (ref 0–0)
PLATELET # BLD AUTO: 233 K/UL — SIGNIFICANT CHANGE UP (ref 150–400)
PLATELET # BLD AUTO: 233 K/UL — SIGNIFICANT CHANGE UP (ref 150–400)
POTASSIUM SERPL-MCNC: 3.5 MMOL/L — SIGNIFICANT CHANGE UP (ref 3.5–5.3)
POTASSIUM SERPL-MCNC: 3.5 MMOL/L — SIGNIFICANT CHANGE UP (ref 3.5–5.3)
POTASSIUM SERPL-SCNC: 3.5 MMOL/L — SIGNIFICANT CHANGE UP (ref 3.5–5.3)
POTASSIUM SERPL-SCNC: 3.5 MMOL/L — SIGNIFICANT CHANGE UP (ref 3.5–5.3)
PROT SERPL-MCNC: 6.2 G/DL — SIGNIFICANT CHANGE UP (ref 6–8.3)
PROT SERPL-MCNC: 6.2 G/DL — SIGNIFICANT CHANGE UP (ref 6–8.3)
RBC # BLD: 3.89 M/UL — SIGNIFICANT CHANGE UP (ref 3.8–5.2)
RBC # BLD: 3.89 M/UL — SIGNIFICANT CHANGE UP (ref 3.8–5.2)
RBC # FLD: 13.4 % — SIGNIFICANT CHANGE UP (ref 10.3–14.5)
RBC # FLD: 13.4 % — SIGNIFICANT CHANGE UP (ref 10.3–14.5)
SODIUM SERPL-SCNC: 138 MMOL/L — SIGNIFICANT CHANGE UP (ref 135–145)
SODIUM SERPL-SCNC: 138 MMOL/L — SIGNIFICANT CHANGE UP (ref 135–145)
WBC # BLD: 6.39 K/UL — SIGNIFICANT CHANGE UP (ref 3.8–10.5)
WBC # BLD: 6.39 K/UL — SIGNIFICANT CHANGE UP (ref 3.8–10.5)
WBC # FLD AUTO: 6.39 K/UL — SIGNIFICANT CHANGE UP (ref 3.8–10.5)
WBC # FLD AUTO: 6.39 K/UL — SIGNIFICANT CHANGE UP (ref 3.8–10.5)

## 2023-11-03 RX ORDER — DEXTROSE MONOHYDRATE, SODIUM CHLORIDE, AND POTASSIUM CHLORIDE 50; .745; 4.5 G/1000ML; G/1000ML; G/1000ML
1000 INJECTION, SOLUTION INTRAVENOUS
Refills: 0 | Status: DISCONTINUED | OUTPATIENT
Start: 2023-11-03 | End: 2023-11-04

## 2023-11-03 RX ADMIN — DEXTROSE MONOHYDRATE, SODIUM CHLORIDE, AND POTASSIUM CHLORIDE 125 MILLILITER(S): 50; .745; 4.5 INJECTION, SOLUTION INTRAVENOUS at 18:00

## 2023-11-03 RX ADMIN — DEXTROSE MONOHYDRATE, SODIUM CHLORIDE, AND POTASSIUM CHLORIDE 150 MILLILITER(S): 50; .745; 4.5 INJECTION, SOLUTION INTRAVENOUS at 09:41

## 2023-11-03 RX ADMIN — Medication 1 TABLET(S): at 13:04

## 2023-11-03 RX ADMIN — FAMOTIDINE 20 MILLIGRAM(S): 10 INJECTION INTRAVENOUS at 05:42

## 2023-11-03 RX ADMIN — FAMOTIDINE 20 MILLIGRAM(S): 10 INJECTION INTRAVENOUS at 18:01

## 2023-11-03 RX ADMIN — SODIUM CHLORIDE 150 MILLILITER(S): 9 INJECTION, SOLUTION INTRAVENOUS at 07:19

## 2023-11-03 NOTE — PROGRESS NOTE ADULT - ASSESSMENT
23F with gallstone pancreatitis. lipase downtrending  with 36w gestation    -continue conservative management  -IVF  -NPO  -Pain control  -Anti-emetic  -care per primary  -no acute surgical intervention at this time  -Discuss with Dr. Rothman  
Continue present regimen.  NPO
HD#3 admitted pregnant at 34 3/7wks   23F with gallstone pancreatitis. lipase downtrending  clinically improving : no more vomiting/no more nausea   denies diarrhea  no fever  pt now is hungry  - surgical consult conservative management no surgery  - LFT's normal  - downtrending: amylase/lipase    MEDICATIONS  (STANDING):  dextrose 5% + lactated ringers with potassium chloride 20 mEq/L 1000 milliLiter(s) (150 mL/Hr) IV Continuous <Continuous>  famotidine Injectable 20 milliGRAM(s) IV Push every 12 hours  prenatal multivitamin 1 Tablet(s) Oral daily    MEDICATIONS  (PRN):  morphine  - Injectable 4 milliGRAM(s) IV Push once PRN Severe Pain (7 - 10)  ondansetron Injectable 4 milliGRAM(s) IV Push every 4 hours PRN Nausea and/or Vomiting  
23y.o. 34 wk gravid Female with resolved biliary colic    -Advance diet as tolerated  -Rec outpt f/u post delivery for elective cholecystectomy  -con't present care  -Contents of this note signed out to alexandr ANDRES [phone]     This note and its recommendations herein are preliminary until such time as cosigned by an attending.
a/p 22yo  @34.2wks presenting to the ED with severe epigastric pain admitted with gallstone pancreatitis, lipase >5000, amylase 1302; pt stable  - GI consulted pending recommendations  - BPP 23: , matteo 17.4cm, vtx, efw 3200  - am cbc, cmp, amylase and lipase, hemoglobin a1c  - NPO diet  - d/w General Surgery Dr. Rothman - continue NPO diet; pt is not a surgical candidate at this time  - IVF hydration, multivitamin bag daily   - will discuss with mfm after GI provides their recommendations  - zofran, tylenol, pepcid  - pt seen and evaluated with dr. montgomery

## 2023-11-03 NOTE — PROGRESS NOTE ADULT - SUBJECTIVE AND OBJECTIVE BOX
HD#3 admitted pregnant at 34 3/7wks   23F with gallstone pancreatitis. lipase downtrending    - surgical consult noted and appreciated:   -continue conservative management  -IVF  -NPO  -Pain control  -Anti-emetic  -care per primary  -no acute surgical intervention at this time      pt reports today she feels much better  denies fever  denies vomiting - last vomiting 2days ago  deneis nausea  denies diarrhea  pt has been npo  iv fluids changed to d5LR w kcl 20meq due to pt is npo    ob: denies vag bleeding/LOF per vagina  denies abd pain/cramps  pt reports fetal movement  nst q 12hrs     bmi: 37.6    Vital Signs Last 24 Hrs  T(C): 36.7 (03 Nov 2023 06:54), Max: 36.8 (02 Nov 2023 14:20)  T(F): 98.1 (03 Nov 2023 06:54), Max: 98.3 (02 Nov 2023 18:51)  HR: 68 (03 Nov 2023 06:54) (68 - 84)  BP: 104/68 (03 Nov 2023 06:54) (104/65 - 113/74)  BP(mean): --  RR: 18 (03 Nov 2023 06:54) (18 - 18)  SpO2: 98% (03 Nov 2023 06:54) (96% - 98%)    Parameters below as of 03 Nov 2023 06:54  Patient On (Oxygen Delivery Method): room air    skin warm dry good color  abd gravid soft no guarding no rigidity on the uterus  pelvic ve deferred; but no evidence of bleeding/LOF  extrem no pitting edema                           10.9   6.39  )-----------( 233      ( 03 Nov 2023 06:00 )             31.8   11-03    138  |  104  |  6<L>  ----------------------------<  58<L>  3.5   |  22  |  0.34<L>    Ca    8.6      03 Nov 2023 06:00    TPro  6.2  /  Alb  2.2<L>  /  TBili  0.6  /  DBili  x   /  AST  29  /  ALT  52  /  AlkPhos  179<H>  11-03    Amylase: 108 U/L  Lipase: 52:     < from: US Fetal Bio Profile w/Non-Stress (11.01.23 @ 15:54) >  GESTATIONAL AGE: 34 weeks, 1 day based on established due date of   12/12/2023.    COMPARISON: None available.    TECHNIQUE:  Transabdominal pelvic sonogram only.    FINDINGS:    Single live Intrauterine gestation is present.  Fetal position: Cephalic presentation.  Placenta location: Anterior.  Amniotic fluid volume: LONG 17.4 cm.  Cervix: Obscured by shadowing from the fetal calvarium.  Fetal motion is seen in real-time and the fetal heart rate measures 145   bpm bpm.    Fetal anatomy and umbilical cord were not evaluated.    Measurements are as follows:    BPD: 9.0 cm  corresponding to 36 weeks, 2 days.  HC: 31.5 cm corresponding to 35 weeks, 2 days.  AC: 34.2 cm corresponding to 38 weeks, 1 days.  FL: 7.2 cm corresponding to 37 weeks, 0 days.    Estimated fetal weight: 3166 g,   7 pounds, 0 ounces    Estimated fetal weight percent: Greater than 97%.    The biophysical profile is 8/8.    Body Movement: 2  (score 2) Greater or equal than 3 body/limb movements  (score 0) Less than 3 body/limb movements    Tone: 2  (score 2) One or more episodes extension/flexion  (score 0) No episodes extension/flexion    Breathing movements: 2  (score 2) Any breathing movements or hiccups  (score 0) No breathing movements    Amniotic fluid: 2  (score 2) One pocket >= 2cm in 2 perpendicular planes  (score 0) <2 X 2 cm pocket    Additional comments: None.    IMPRESSION:  Single live intrauterine gestation, estimated gestational age of 36   weeks, 5 days (expected gestational age of 34 weeks, 1 day)  8/8 biophysical profile    Please note that this study was not optimized for the evaluation of fetal  anatomy and umbilical cord which should be performed on a separate basis   as clinically indicated.      < end of copied text >  < from: US Abdomen Upper Quadrant Right (11.01.23 @ 15:50) >  INTERPRETATION:  CLINICAL INFORMATION: Right upper quadrant abdominal pain    COMPARISON: None available.    TECHNIQUE: Sonography of the right upper quadrant.    FINDINGS:  Liver: The liver is normal in size. It is mildly increased in   echogenicity consistent with mild hepatic steatosis. No focal hepatic   lesions are identified. Patent hepatic and portal veins with normal flow   direction.  Bile ducts: Normal caliber. Common bile duct measures 5 mm.  Gallbladder: Cholelithiasis.  No wall thickening or pericholecystic   fluid. Focal tenderness noted by technologist.  Pancreas: Visualized portions are within normal limits.  Right kidney: 12.4 cm. No hydronephrosis.  Ascites: None.  IVC: Visualized portions are within normal limits.    IMPRESSION:  Mild hepatic steatosis.  Cholelithiasis.  No gallbladder wall thickening or pericholecystic fluid.  Focal gallbladder tenderness noted by technologist.

## 2023-11-03 NOTE — PROGRESS NOTE ADULT - SUBJECTIVE AND OBJECTIVE BOX
INTERVAL HPI/OVERNIGHT EVENTS:  Pt resting comfortably. No acute complaints.   No further abd pain.  Tolerating clear liquid diet.   Denies N/V.    MEDICATIONS  (STANDING):  dextrose 5% + lactated ringers with potassium chloride 20 mEq/L 1000 milliLiter(s) (150 mL/Hr) IV Continuous <Continuous>  famotidine Injectable 20 milliGRAM(s) IV Push every 12 hours  prenatal multivitamin 1 Tablet(s) Oral daily    MEDICATIONS  (PRN):  morphine  - Injectable 4 milliGRAM(s) IV Push once PRN Severe Pain (7 - 10)  ondansetron Injectable 4 milliGRAM(s) IV Push every 4 hours PRN Nausea and/or Vomiting    Vital Signs Last 24 Hrs  T(C): 36.4 (03 Nov 2023 11:00), Max: 36.8 (02 Nov 2023 14:20)  T(F): 97.5 (03 Nov 2023 11:00), Max: 98.3 (02 Nov 2023 18:51)  HR: 67 (03 Nov 2023 11:00) (67 - 75)  BP: 102/67 (03 Nov 2023 11:00) (102/67 - 113/74)  BP(mean): --  RR: 18 (03 Nov 2023 11:00) (18 - 18)  SpO2: 98% (03 Nov 2023 11:00) (97% - 98%)    Parameters below as of 03 Nov 2023 06:54  Patient On (Oxygen Delivery Method): room air    Physical:  General: A&Ox3. NAD.  Abdomen: Soft, gravid, nontender.    LABS:                        10.9   6.39  )-----------( 233      ( 03 Nov 2023 06:00 )             31.8             11-03    138  |  104  |  6<L>  ----------------------------<  58<L>  3.5   |  22  |  0.34<L>    Ca    8.6      03 Nov 2023 06:00    TPro  6.2  /  Alb  2.2<L>  /  TBili  0.6  /  DBili  x   /  AST  29  /  ALT  52  /  AlkPhos  179<H>  11-03

## 2023-11-03 NOTE — CHART NOTE - NSCHARTNOTEFT_GEN_A_CORE
Focus note: Received Consult request by OB PA for diet education/ copy on low fat/ fat restricted diet. Pt w/ known gallstones dx'ed 1 month ago, presented w/ N/V abdominal pain. Was NPO/ clears x2 -3 days, Pt improved, diet advanced to solids. Discussed w/ 2 OB PAs. Diet ed/copy : Fat Restricted Nutrition Therapy in Serbian provided to PA (Serbian speaking) for Pt. MD/ PA  to monitor. RD available.

## 2023-11-03 NOTE — CHART NOTE - NSCHARTNOTEFT_GEN_A_CORE
OB PA Focused Note    Patient offers no complaints at this time  nst cat I baseline 120, moderate variability, accels, no decel  toco no contractions  continue current mngt  discharge planning   laura Melendez eap [place] attending

## 2023-11-04 ENCOUNTER — TRANSCRIPTION ENCOUNTER (OUTPATIENT)
Age: 23
End: 2023-11-04

## 2023-11-04 VITALS
RESPIRATION RATE: 18 BRPM | OXYGEN SATURATION: 97 % | SYSTOLIC BLOOD PRESSURE: 101 MMHG | DIASTOLIC BLOOD PRESSURE: 67 MMHG | TEMPERATURE: 98 F | HEART RATE: 77 BPM

## 2023-11-04 LAB
ALBUMIN SERPL ELPH-MCNC: 2.2 G/DL — LOW (ref 3.5–5)
ALBUMIN SERPL ELPH-MCNC: 2.2 G/DL — LOW (ref 3.5–5)
ALP SERPL-CCNC: 173 U/L — HIGH (ref 40–120)
ALP SERPL-CCNC: 173 U/L — HIGH (ref 40–120)
ALT FLD-CCNC: 42 U/L DA — SIGNIFICANT CHANGE UP (ref 10–60)
ALT FLD-CCNC: 42 U/L DA — SIGNIFICANT CHANGE UP (ref 10–60)
AMYLASE P1 CFR SERPL: 66 U/L — SIGNIFICANT CHANGE UP (ref 25–115)
AMYLASE P1 CFR SERPL: 66 U/L — SIGNIFICANT CHANGE UP (ref 25–115)
ANION GAP SERPL CALC-SCNC: 8 MMOL/L — SIGNIFICANT CHANGE UP (ref 5–17)
ANION GAP SERPL CALC-SCNC: 8 MMOL/L — SIGNIFICANT CHANGE UP (ref 5–17)
AST SERPL-CCNC: 20 U/L — SIGNIFICANT CHANGE UP (ref 10–40)
AST SERPL-CCNC: 20 U/L — SIGNIFICANT CHANGE UP (ref 10–40)
BASOPHILS # BLD AUTO: 0.03 K/UL — SIGNIFICANT CHANGE UP (ref 0–0.2)
BASOPHILS # BLD AUTO: 0.03 K/UL — SIGNIFICANT CHANGE UP (ref 0–0.2)
BASOPHILS NFR BLD AUTO: 0.5 % — SIGNIFICANT CHANGE UP (ref 0–2)
BASOPHILS NFR BLD AUTO: 0.5 % — SIGNIFICANT CHANGE UP (ref 0–2)
BILIRUB SERPL-MCNC: 0.4 MG/DL — SIGNIFICANT CHANGE UP (ref 0.2–1.2)
BILIRUB SERPL-MCNC: 0.4 MG/DL — SIGNIFICANT CHANGE UP (ref 0.2–1.2)
BUN SERPL-MCNC: 7 MG/DL — SIGNIFICANT CHANGE UP (ref 7–18)
BUN SERPL-MCNC: 7 MG/DL — SIGNIFICANT CHANGE UP (ref 7–18)
CALCIUM SERPL-MCNC: 8.5 MG/DL — SIGNIFICANT CHANGE UP (ref 8.4–10.5)
CALCIUM SERPL-MCNC: 8.5 MG/DL — SIGNIFICANT CHANGE UP (ref 8.4–10.5)
CHLORIDE SERPL-SCNC: 109 MMOL/L — HIGH (ref 96–108)
CHLORIDE SERPL-SCNC: 109 MMOL/L — HIGH (ref 96–108)
CO2 SERPL-SCNC: 23 MMOL/L — SIGNIFICANT CHANGE UP (ref 22–31)
CO2 SERPL-SCNC: 23 MMOL/L — SIGNIFICANT CHANGE UP (ref 22–31)
CREAT SERPL-MCNC: 0.5 MG/DL — SIGNIFICANT CHANGE UP (ref 0.5–1.3)
CREAT SERPL-MCNC: 0.5 MG/DL — SIGNIFICANT CHANGE UP (ref 0.5–1.3)
EGFR: 135 ML/MIN/1.73M2 — SIGNIFICANT CHANGE UP
EGFR: 135 ML/MIN/1.73M2 — SIGNIFICANT CHANGE UP
EOSINOPHIL # BLD AUTO: 0.05 K/UL — SIGNIFICANT CHANGE UP (ref 0–0.5)
EOSINOPHIL # BLD AUTO: 0.05 K/UL — SIGNIFICANT CHANGE UP (ref 0–0.5)
EOSINOPHIL NFR BLD AUTO: 0.8 % — SIGNIFICANT CHANGE UP (ref 0–6)
EOSINOPHIL NFR BLD AUTO: 0.8 % — SIGNIFICANT CHANGE UP (ref 0–6)
GLUCOSE SERPL-MCNC: 100 MG/DL — HIGH (ref 70–99)
GLUCOSE SERPL-MCNC: 100 MG/DL — HIGH (ref 70–99)
HCT VFR BLD CALC: 31.2 % — LOW (ref 34.5–45)
HCT VFR BLD CALC: 31.2 % — LOW (ref 34.5–45)
HGB BLD-MCNC: 10.7 G/DL — LOW (ref 11.5–15.5)
HGB BLD-MCNC: 10.7 G/DL — LOW (ref 11.5–15.5)
IMM GRANULOCYTES NFR BLD AUTO: 0.5 % — SIGNIFICANT CHANGE UP (ref 0–0.9)
IMM GRANULOCYTES NFR BLD AUTO: 0.5 % — SIGNIFICANT CHANGE UP (ref 0–0.9)
LIDOCAIN IGE QN: 55 U/L — SIGNIFICANT CHANGE UP (ref 13–75)
LIDOCAIN IGE QN: 55 U/L — SIGNIFICANT CHANGE UP (ref 13–75)
LYMPHOCYTES # BLD AUTO: 1.4 K/UL — SIGNIFICANT CHANGE UP (ref 1–3.3)
LYMPHOCYTES # BLD AUTO: 1.4 K/UL — SIGNIFICANT CHANGE UP (ref 1–3.3)
LYMPHOCYTES # BLD AUTO: 23.6 % — SIGNIFICANT CHANGE UP (ref 13–44)
LYMPHOCYTES # BLD AUTO: 23.6 % — SIGNIFICANT CHANGE UP (ref 13–44)
MCHC RBC-ENTMCNC: 27.8 PG — SIGNIFICANT CHANGE UP (ref 27–34)
MCHC RBC-ENTMCNC: 27.8 PG — SIGNIFICANT CHANGE UP (ref 27–34)
MCHC RBC-ENTMCNC: 34.3 GM/DL — SIGNIFICANT CHANGE UP (ref 32–36)
MCHC RBC-ENTMCNC: 34.3 GM/DL — SIGNIFICANT CHANGE UP (ref 32–36)
MCV RBC AUTO: 81 FL — SIGNIFICANT CHANGE UP (ref 80–100)
MCV RBC AUTO: 81 FL — SIGNIFICANT CHANGE UP (ref 80–100)
MONOCYTES # BLD AUTO: 0.44 K/UL — SIGNIFICANT CHANGE UP (ref 0–0.9)
MONOCYTES # BLD AUTO: 0.44 K/UL — SIGNIFICANT CHANGE UP (ref 0–0.9)
MONOCYTES NFR BLD AUTO: 7.4 % — SIGNIFICANT CHANGE UP (ref 2–14)
MONOCYTES NFR BLD AUTO: 7.4 % — SIGNIFICANT CHANGE UP (ref 2–14)
NEUTROPHILS # BLD AUTO: 3.98 K/UL — SIGNIFICANT CHANGE UP (ref 1.8–7.4)
NEUTROPHILS # BLD AUTO: 3.98 K/UL — SIGNIFICANT CHANGE UP (ref 1.8–7.4)
NEUTROPHILS NFR BLD AUTO: 67.2 % — SIGNIFICANT CHANGE UP (ref 43–77)
NEUTROPHILS NFR BLD AUTO: 67.2 % — SIGNIFICANT CHANGE UP (ref 43–77)
NRBC # BLD: 0 /100 WBCS — SIGNIFICANT CHANGE UP (ref 0–0)
NRBC # BLD: 0 /100 WBCS — SIGNIFICANT CHANGE UP (ref 0–0)
PLATELET # BLD AUTO: 250 K/UL — SIGNIFICANT CHANGE UP (ref 150–400)
PLATELET # BLD AUTO: 250 K/UL — SIGNIFICANT CHANGE UP (ref 150–400)
POTASSIUM SERPL-MCNC: 3.7 MMOL/L — SIGNIFICANT CHANGE UP (ref 3.5–5.3)
POTASSIUM SERPL-MCNC: 3.7 MMOL/L — SIGNIFICANT CHANGE UP (ref 3.5–5.3)
POTASSIUM SERPL-SCNC: 3.7 MMOL/L — SIGNIFICANT CHANGE UP (ref 3.5–5.3)
POTASSIUM SERPL-SCNC: 3.7 MMOL/L — SIGNIFICANT CHANGE UP (ref 3.5–5.3)
PROT SERPL-MCNC: 6.1 G/DL — SIGNIFICANT CHANGE UP (ref 6–8.3)
PROT SERPL-MCNC: 6.1 G/DL — SIGNIFICANT CHANGE UP (ref 6–8.3)
RBC # BLD: 3.85 M/UL — SIGNIFICANT CHANGE UP (ref 3.8–5.2)
RBC # BLD: 3.85 M/UL — SIGNIFICANT CHANGE UP (ref 3.8–5.2)
RBC # FLD: 13.5 % — SIGNIFICANT CHANGE UP (ref 10.3–14.5)
RBC # FLD: 13.5 % — SIGNIFICANT CHANGE UP (ref 10.3–14.5)
SODIUM SERPL-SCNC: 140 MMOL/L — SIGNIFICANT CHANGE UP (ref 135–145)
SODIUM SERPL-SCNC: 140 MMOL/L — SIGNIFICANT CHANGE UP (ref 135–145)
WBC # BLD: 5.93 K/UL — SIGNIFICANT CHANGE UP (ref 3.8–10.5)
WBC # BLD: 5.93 K/UL — SIGNIFICANT CHANGE UP (ref 3.8–10.5)
WBC # FLD AUTO: 5.93 K/UL — SIGNIFICANT CHANGE UP (ref 3.8–10.5)
WBC # FLD AUTO: 5.93 K/UL — SIGNIFICANT CHANGE UP (ref 3.8–10.5)

## 2023-11-04 PROCEDURE — 85610 PROTHROMBIN TIME: CPT

## 2023-11-04 PROCEDURE — 80053 COMPREHEN METABOLIC PANEL: CPT

## 2023-11-04 PROCEDURE — 83690 ASSAY OF LIPASE: CPT

## 2023-11-04 PROCEDURE — 83036 HEMOGLOBIN GLYCOSYLATED A1C: CPT

## 2023-11-04 PROCEDURE — 86780 TREPONEMA PALLIDUM: CPT

## 2023-11-04 PROCEDURE — 87340 HEPATITIS B SURFACE AG IA: CPT

## 2023-11-04 PROCEDURE — 85384 FIBRINOGEN ACTIVITY: CPT

## 2023-11-04 PROCEDURE — 86703 HIV-1/HIV-2 1 RESULT ANTBDY: CPT

## 2023-11-04 PROCEDURE — 76818 FETAL BIOPHYS PROFILE W/NST: CPT

## 2023-11-04 PROCEDURE — 85025 COMPLETE CBC W/AUTO DIFF WBC: CPT

## 2023-11-04 PROCEDURE — G0463: CPT

## 2023-11-04 PROCEDURE — 87389 HIV-1 AG W/HIV-1&-2 AB AG IA: CPT

## 2023-11-04 PROCEDURE — 36415 COLL VENOUS BLD VENIPUNCTURE: CPT

## 2023-11-04 PROCEDURE — 76815 OB US LIMITED FETUS(S): CPT

## 2023-11-04 PROCEDURE — 82150 ASSAY OF AMYLASE: CPT

## 2023-11-04 PROCEDURE — 76705 ECHO EXAM OF ABDOMEN: CPT

## 2023-11-04 PROCEDURE — 85730 THROMBOPLASTIN TIME PARTIAL: CPT

## 2023-11-04 PROCEDURE — 59025 FETAL NON-STRESS TEST: CPT

## 2023-11-04 PROCEDURE — 86762 RUBELLA ANTIBODY: CPT

## 2023-11-04 RX ADMIN — DEXTROSE MONOHYDRATE, SODIUM CHLORIDE, AND POTASSIUM CHLORIDE 125 MILLILITER(S): 50; .745; 4.5 INJECTION, SOLUTION INTRAVENOUS at 04:44

## 2023-11-04 NOTE — DISCHARGE NOTE OB - PLAN OF CARE
Please drink plenty of water in pregnancy  Choose a low fat diet such as fish, chicken, turkey, and lean meats. Use dried beans, peas, lentils and tofu. Limits egg yolks to 3-4x a week. If you eat red meat ; limit no more than 3 servings a week.  Avoid fried food

## 2023-11-04 NOTE — DISCHARGE NOTE OB - CARE PROVIDER_API CALL
[place],   F/U in [place] to continue outpatient treatement as scheduled.   Please call  [place] 11/6 to schedule sooner appt  Phone: (   )    -  Fax: (   )    -  Follow Up Time:

## 2023-11-04 NOTE — DISCHARGE NOTE OB - PROVIDER TOKENS
Male FREE:[LAST:[[place]],PHONE:[(   )    -],FAX:[(   )    -],ADDRESS:[F/U in [place] to continue outpatient treatement as scheduled.   Please call  [place] 11/6 to schedule sooner appt]]

## 2023-11-04 NOTE — DISCHARGE NOTE OB - PATIENT PORTAL LINK FT
You can access the FollowMyHealth Patient Portal offered by [facility] by registering at the following website: [email or web address]. By joining [facility]’s FollowMyHealth portal, you will also be able to view your health information using other applications (apps) compatible with our system.

## 2023-11-04 NOTE — DISCHARGE NOTE OB - HOSPITAL COURSE
22yo  iup at 34.4wks admitted for Nausea, vomiting and abdominal pain. Pt found to have gallstone pancreatitis Pt initially with elevated amylase and lipase. Amylase 1302--> 109 and lipase 5000--> 52. Pt sent home with resolution of symptoms and low fat diet

## 2023-11-04 NOTE — DISCHARGE NOTE OB - CARE PLAN
1 Principal Discharge DX:	Gallstone pancreatitis  Assessment and plan of treatment:	Please drink plenty of water in pregnancy  Choose a low fat diet such as fish, chicken, turkey, and lean meats. Use dried beans, peas, lentils and tofu. Limits egg yolks to 3-4x a week. If you eat red meat ; limit no more than 3 servings a week.  Avoid fried food

## 2023-11-04 NOTE — CHART NOTE - NSCHARTNOTEFT_GEN_A_CORE
Preventive Health Recommendations  Male Ages 50   64    Yearly exam:             See your health care provider every year in order to  o   Review health changes.   o   Discuss preventive care.    o   Review your medicines if your doctor has prescribed any.     Have a cholesterol test every 5 years, or more frequently if you are at risk for high cholesterol/heart disease.     Have a diabetes test (fasting glucose) every three years. If you are at risk for diabetes, you should have this test more often.     Have a colonoscopy at age 50, or have a yearly FIT test (stool test). These exams will check for colon cancer.      Talk with your health care provider about whether or not a prostate cancer screening test (PSA) is right for you.    You should be tested each year for STDs (sexually transmitted diseases), if you re at risk.     Shots: Get a flu shot each year. Get a tetanus shot every 10 years.     Nutrition:    Eat at least 5 servings of fruits and vegetables daily.     Eat whole-grain bread, whole-wheat pasta and brown rice instead of white grains and rice.     Talk to your provider about Calcium and Vitamin D.     Lifestyle    Exercise for at least 150 minutes a week (30 minutes a day, 5 days a week). This will help you control your weight and prevent disease.     Limit alcohol to one drink per day.     No smoking.     Wear sunscreen to prevent skin cancer.     See your dentist every six months for an exam and cleaning.     See your eye doctor every 1 to 2 years.    (Z00.00) Routine history and physical examination of adult  (primary encounter diagnosis)    Comment:      Plan:          (M54.5,  G89.29) Chronic right-sided low back pain without sciatica    Comment: FACETTE JOINT AND  MILD DISC BULGING    Plan: baclofen (LIORESAL) 10 MG tablet    1/4 TO TABLET AT HOUR OF SLEEP  MAXIMAL FOUR TIMES DAILY     , lidocaine       (LMX4) 4 % CREA cream,  UP TO 4 X DAILY    ORTHO        REFERRAL, Basic metabolic  Pt states all pain has resolved.  Pt states +fm, no vb, no rom, no ctx, no fever, or chills, no cp; no sob; no n/v/d/c, no h/a, blurry vision or epigastric pain, no dysuria, urgency or frequency. Tolerating regular diet, voiding without difficulty.     Vital Signs Last 24 Hrs  T(C): 36.7 (04 Nov 2023 05:00), Max: 36.8 (03 Nov 2023 19:08)  T(F): 98 (04 Nov 2023 05:00), Max: 98.2 (03 Nov 2023 19:08)  HR: 77 (04 Nov 2023 05:00) (65 - 78)  BP: 101/67 (04 Nov 2023 05:00) (100/69 - 110/73)  BP(mean): --  RR: 18 (04 Nov 2023 05:00) (18 - 18)  SpO2: 97% (04 Nov 2023 05:00) (97% - 98%)    Parameters below as of 04 Nov 2023 05:00  Patient On (Oxygen Delivery Method): room air        PE:   Gen: A&Ox3; NAD  Abd: soft/nt/gravid  back: no cvat b/l   moderate variability - reactive   ctx none  ext: no calf pain; edema 1+b/l, dtr's 2+b/l, venodynes in place    Urinalysis Basic - ( 04 Nov 2023 06:50 )    Color: x / Appearance: x / SG: x / pH: x  Gluc: 100 mg/dL / Ketone: x  / Bili: x / Urobili: x   Blood: x / Protein: x / Nitrite: x   Leuk Esterase: x / RBC: x / WBC x   Sq Epi: x / Non Sq Epi: x / Bacteria: x      CBC Full  -  ( 04 Nov 2023 06:50 )  WBC Count : 5.93 K/uL  RBC Count : 3.85 M/uL  Hemoglobin : 10.7 g/dL  Hematocrit : 31.2 %  Platelet Count - Automated : 250 K/uL  Mean Cell Volume : 81.0 fl  Mean Cell Hemoglobin : 27.8 pg  Mean Cell Hemoglobin Concentration : 34.3 gm/dL  Auto Neutrophil # : 3.98 K/uL  Auto Lymphocyte # : 1.40 K/uL  Auto Monocyte # : 0.44 K/uL  Auto Eosinophil # : 0.05 K/uL  Auto Basophil # : 0.03 K/uL  Auto Neutrophil % : 67.2 %  Auto Lymphocyte % : 23.6 %  Auto Monocyte % : 7.4 %  Auto Eosinophil % : 0.8 %  Auto Basophil % : 0.5 %    11-04    140  |  109<H>  |  7   ----------------------------<  100<H>  3.7   |  23  |  0.50    Ca    8.5      04 Nov 2023 06:50    TPro  6.1  /  Alb  2.2<L>  /  TBili  0.4  /  DBili  x   /  AST  20  /  ALT  42  /  AlkPhos  173<H>  11-04      a/p siup @ with gallstone pancreatitis and resolution of symptoms   Pt seen with Dr. 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.9) Impotence of organic origin    Comment:      Plan:          (E78.5) Hyperlipidemia LDL goal <100    Comment:      Plan: Lipid panel reflex to direct LDL                    (E78.2) Mixed hyperlipidemia    Comment:          Plan: Lipid panel reflex to direct LDL                    (H90.8) Mixed hearing loss, unilateral    Comment:      Plan:          (Z85.46) History of prostate cancer    Comment:      Plan:          (Z87.39) History of gout    Comment:      Plan:          (F41.9) Anxiety    Comment:      Plan:          (F51.02) Transient insomnia    Comment:       Plan:          (H81.12) BPV (benign positional vertigo), left    Comment:      Plan:          (E55.9) Vitamin D insufficiency    Comment:      Plan: Vitamin D Deficiency                   (E55.9) Vitamin D deficiency    Comment:      Plan: Vitamin D Deficiency                   (Z86.008) H/O carcinoma in situ of prostate    Comment:       Plan: Prostate spec antigen screen

## 2023-12-03 ENCOUNTER — INPATIENT (INPATIENT)
Facility: HOSPITAL | Age: 23
LOS: 0 days | Discharge: HOME | DRG: 998 | End: 2023-12-04
Attending: OBSTETRICS & GYNECOLOGY | Admitting: OBSTETRICS & GYNECOLOGY
Payer: MEDICAID

## 2023-12-03 VITALS — WEIGHT: 218.92 LBS | HEIGHT: 64 IN

## 2023-12-03 DIAGNOSIS — Z3A.00 WEEKS OF GESTATION OF PREGNANCY NOT SPECIFIED: ICD-10-CM

## 2023-12-03 DIAGNOSIS — Z34.80 ENCOUNTER FOR SUPERVISION OF OTHER NORMAL PREGNANCY, UNSPECIFIED TRIMESTER: ICD-10-CM

## 2023-12-03 DIAGNOSIS — O26.899 OTHER SPECIFIED PREGNANCY RELATED CONDITIONS, UNSPECIFIED TRIMESTER: ICD-10-CM

## 2023-12-03 LAB
ALBUMIN SERPL ELPH-MCNC: 2.6 G/DL — LOW (ref 3.5–5)
ALP SERPL-CCNC: 303 U/L — HIGH (ref 40–120)
ALP SERPL-CCNC: 303 U/L — HIGH (ref 40–120)
ALP SERPL-CCNC: 310 U/L — HIGH (ref 40–120)
ALP SERPL-CCNC: 310 U/L — HIGH (ref 40–120)
ALT FLD-CCNC: 74 U/L DA — HIGH (ref 10–60)
ALT FLD-CCNC: 74 U/L DA — HIGH (ref 10–60)
ALT FLD-CCNC: 76 U/L DA — HIGH (ref 10–60)
ALT FLD-CCNC: 76 U/L DA — HIGH (ref 10–60)
AMYLASE P1 CFR SERPL: 938 U/L — HIGH (ref 25–115)
AMYLASE P1 CFR SERPL: 938 U/L — HIGH (ref 25–115)
AMYLASE P1 CFR SERPL: >1302 U/L — HIGH (ref 25–115)
AMYLASE P1 CFR SERPL: >1302 U/L — HIGH (ref 25–115)
ANION GAP SERPL CALC-SCNC: 6 MMOL/L — SIGNIFICANT CHANGE UP (ref 5–17)
ANION GAP SERPL CALC-SCNC: 6 MMOL/L — SIGNIFICANT CHANGE UP (ref 5–17)
ANION GAP SERPL CALC-SCNC: 7 MMOL/L — SIGNIFICANT CHANGE UP (ref 5–17)
ANION GAP SERPL CALC-SCNC: 7 MMOL/L — SIGNIFICANT CHANGE UP (ref 5–17)
APPEARANCE UR: ABNORMAL
APPEARANCE UR: ABNORMAL
APTT BLD: 30.5 SEC — SIGNIFICANT CHANGE UP (ref 24.5–35.6)
APTT BLD: 30.5 SEC — SIGNIFICANT CHANGE UP (ref 24.5–35.6)
AST SERPL-CCNC: 45 U/L — HIGH (ref 10–40)
AST SERPL-CCNC: 45 U/L — HIGH (ref 10–40)
AST SERPL-CCNC: 49 U/L — HIGH (ref 10–40)
AST SERPL-CCNC: 49 U/L — HIGH (ref 10–40)
BASOPHILS # BLD AUTO: 0.01 K/UL — SIGNIFICANT CHANGE UP (ref 0–0.2)
BASOPHILS # BLD AUTO: 0.01 K/UL — SIGNIFICANT CHANGE UP (ref 0–0.2)
BASOPHILS # BLD AUTO: 0.02 K/UL — SIGNIFICANT CHANGE UP (ref 0–0.2)
BASOPHILS # BLD AUTO: 0.02 K/UL — SIGNIFICANT CHANGE UP (ref 0–0.2)
BASOPHILS NFR BLD AUTO: 0.2 % — SIGNIFICANT CHANGE UP (ref 0–2)
BILIRUB SERPL-MCNC: 0.6 MG/DL — SIGNIFICANT CHANGE UP (ref 0.2–1.2)
BILIRUB SERPL-MCNC: 0.6 MG/DL — SIGNIFICANT CHANGE UP (ref 0.2–1.2)
BILIRUB SERPL-MCNC: 0.7 MG/DL — SIGNIFICANT CHANGE UP (ref 0.2–1.2)
BILIRUB SERPL-MCNC: 0.7 MG/DL — SIGNIFICANT CHANGE UP (ref 0.2–1.2)
BILIRUB UR-MCNC: ABNORMAL
BILIRUB UR-MCNC: ABNORMAL
BUN SERPL-MCNC: 7 MG/DL — SIGNIFICANT CHANGE UP (ref 7–18)
BUN SERPL-MCNC: 7 MG/DL — SIGNIFICANT CHANGE UP (ref 7–18)
BUN SERPL-MCNC: 9 MG/DL — SIGNIFICANT CHANGE UP (ref 7–18)
BUN SERPL-MCNC: 9 MG/DL — SIGNIFICANT CHANGE UP (ref 7–18)
CALCIUM SERPL-MCNC: 8.6 MG/DL — SIGNIFICANT CHANGE UP (ref 8.4–10.5)
CALCIUM SERPL-MCNC: 8.6 MG/DL — SIGNIFICANT CHANGE UP (ref 8.4–10.5)
CALCIUM SERPL-MCNC: 8.8 MG/DL — SIGNIFICANT CHANGE UP (ref 8.4–10.5)
CALCIUM SERPL-MCNC: 8.8 MG/DL — SIGNIFICANT CHANGE UP (ref 8.4–10.5)
CHLORIDE SERPL-SCNC: 107 MMOL/L — SIGNIFICANT CHANGE UP (ref 96–108)
CHLORIDE SERPL-SCNC: 107 MMOL/L — SIGNIFICANT CHANGE UP (ref 96–108)
CHLORIDE SERPL-SCNC: 108 MMOL/L — SIGNIFICANT CHANGE UP (ref 96–108)
CHLORIDE SERPL-SCNC: 108 MMOL/L — SIGNIFICANT CHANGE UP (ref 96–108)
CO2 SERPL-SCNC: 23 MMOL/L — SIGNIFICANT CHANGE UP (ref 22–31)
CO2 SERPL-SCNC: 23 MMOL/L — SIGNIFICANT CHANGE UP (ref 22–31)
CO2 SERPL-SCNC: 24 MMOL/L — SIGNIFICANT CHANGE UP (ref 22–31)
CO2 SERPL-SCNC: 24 MMOL/L — SIGNIFICANT CHANGE UP (ref 22–31)
COLOR SPEC: SIGNIFICANT CHANGE UP
COLOR SPEC: SIGNIFICANT CHANGE UP
CREAT SERPL-MCNC: 0.58 MG/DL — SIGNIFICANT CHANGE UP (ref 0.5–1.3)
CREAT SERPL-MCNC: 0.58 MG/DL — SIGNIFICANT CHANGE UP (ref 0.5–1.3)
CREAT SERPL-MCNC: 0.63 MG/DL — SIGNIFICANT CHANGE UP (ref 0.5–1.3)
CREAT SERPL-MCNC: 0.63 MG/DL — SIGNIFICANT CHANGE UP (ref 0.5–1.3)
DIFF PNL FLD: NEGATIVE — SIGNIFICANT CHANGE UP
DIFF PNL FLD: NEGATIVE — SIGNIFICANT CHANGE UP
EGFR: 128 ML/MIN/1.73M2 — SIGNIFICANT CHANGE UP
EGFR: 128 ML/MIN/1.73M2 — SIGNIFICANT CHANGE UP
EGFR: 130 ML/MIN/1.73M2 — SIGNIFICANT CHANGE UP
EGFR: 130 ML/MIN/1.73M2 — SIGNIFICANT CHANGE UP
EOSINOPHIL # BLD AUTO: 0 K/UL — SIGNIFICANT CHANGE UP (ref 0–0.5)
EOSINOPHIL # BLD AUTO: 0 K/UL — SIGNIFICANT CHANGE UP (ref 0–0.5)
EOSINOPHIL # BLD AUTO: 0.01 K/UL — SIGNIFICANT CHANGE UP (ref 0–0.5)
EOSINOPHIL # BLD AUTO: 0.01 K/UL — SIGNIFICANT CHANGE UP (ref 0–0.5)
EOSINOPHIL NFR BLD AUTO: 0 % — SIGNIFICANT CHANGE UP (ref 0–6)
EOSINOPHIL NFR BLD AUTO: 0 % — SIGNIFICANT CHANGE UP (ref 0–6)
EOSINOPHIL NFR BLD AUTO: 0.1 % — SIGNIFICANT CHANGE UP (ref 0–6)
EOSINOPHIL NFR BLD AUTO: 0.1 % — SIGNIFICANT CHANGE UP (ref 0–6)
FIBRINOGEN PPP-MCNC: 579 MG/DL — HIGH (ref 200–475)
FIBRINOGEN PPP-MCNC: 579 MG/DL — HIGH (ref 200–475)
GLUCOSE SERPL-MCNC: 115 MG/DL — HIGH (ref 70–99)
GLUCOSE SERPL-MCNC: 115 MG/DL — HIGH (ref 70–99)
GLUCOSE SERPL-MCNC: 95 MG/DL — SIGNIFICANT CHANGE UP (ref 70–99)
GLUCOSE SERPL-MCNC: 95 MG/DL — SIGNIFICANT CHANGE UP (ref 70–99)
GLUCOSE UR QL: NEGATIVE MG/DL — SIGNIFICANT CHANGE UP
GLUCOSE UR QL: NEGATIVE MG/DL — SIGNIFICANT CHANGE UP
HBV SURFACE AG SERPL QL IA: SIGNIFICANT CHANGE UP
HBV SURFACE AG SERPL QL IA: SIGNIFICANT CHANGE UP
HCT VFR BLD CALC: 36.1 % — SIGNIFICANT CHANGE UP (ref 34.5–45)
HCT VFR BLD CALC: 36.1 % — SIGNIFICANT CHANGE UP (ref 34.5–45)
HCT VFR BLD CALC: 37 % — SIGNIFICANT CHANGE UP (ref 34.5–45)
HCT VFR BLD CALC: 37 % — SIGNIFICANT CHANGE UP (ref 34.5–45)
HGB BLD-MCNC: 12.4 G/DL — SIGNIFICANT CHANGE UP (ref 11.5–15.5)
HGB BLD-MCNC: 12.4 G/DL — SIGNIFICANT CHANGE UP (ref 11.5–15.5)
HGB BLD-MCNC: 12.6 G/DL — SIGNIFICANT CHANGE UP (ref 11.5–15.5)
HGB BLD-MCNC: 12.6 G/DL — SIGNIFICANT CHANGE UP (ref 11.5–15.5)
HIV 1 & 2 AB SERPL IA.RAPID: SIGNIFICANT CHANGE UP
HIV 1 & 2 AB SERPL IA.RAPID: SIGNIFICANT CHANGE UP
IMM GRANULOCYTES NFR BLD AUTO: 0.3 % — SIGNIFICANT CHANGE UP (ref 0–0.9)
IMM GRANULOCYTES NFR BLD AUTO: 0.3 % — SIGNIFICANT CHANGE UP (ref 0–0.9)
IMM GRANULOCYTES NFR BLD AUTO: 0.4 % — SIGNIFICANT CHANGE UP (ref 0–0.9)
IMM GRANULOCYTES NFR BLD AUTO: 0.4 % — SIGNIFICANT CHANGE UP (ref 0–0.9)
INR BLD: 0.86 RATIO — SIGNIFICANT CHANGE UP (ref 0.85–1.18)
INR BLD: 0.86 RATIO — SIGNIFICANT CHANGE UP (ref 0.85–1.18)
KETONES UR-MCNC: ABNORMAL MG/DL
KETONES UR-MCNC: ABNORMAL MG/DL
LEUKOCYTE ESTERASE UR-ACNC: ABNORMAL
LEUKOCYTE ESTERASE UR-ACNC: ABNORMAL
LIDOCAIN IGE QN: 1297 U/L — HIGH (ref 13–75)
LIDOCAIN IGE QN: 1297 U/L — HIGH (ref 13–75)
LIDOCAIN IGE QN: >5000 U/L — HIGH (ref 13–75)
LIDOCAIN IGE QN: >5000 U/L — HIGH (ref 13–75)
LYMPHOCYTES # BLD AUTO: 0.71 K/UL — LOW (ref 1–3.3)
LYMPHOCYTES # BLD AUTO: 0.71 K/UL — LOW (ref 1–3.3)
LYMPHOCYTES # BLD AUTO: 1.01 K/UL — SIGNIFICANT CHANGE UP (ref 1–3.3)
LYMPHOCYTES # BLD AUTO: 1.01 K/UL — SIGNIFICANT CHANGE UP (ref 1–3.3)
LYMPHOCYTES # BLD AUTO: 15.6 % — SIGNIFICANT CHANGE UP (ref 13–44)
LYMPHOCYTES # BLD AUTO: 15.6 % — SIGNIFICANT CHANGE UP (ref 13–44)
LYMPHOCYTES # BLD AUTO: 8.8 % — LOW (ref 13–44)
LYMPHOCYTES # BLD AUTO: 8.8 % — LOW (ref 13–44)
MCHC RBC-ENTMCNC: 27.6 PG — SIGNIFICANT CHANGE UP (ref 27–34)
MCHC RBC-ENTMCNC: 27.6 PG — SIGNIFICANT CHANGE UP (ref 27–34)
MCHC RBC-ENTMCNC: 28 PG — SIGNIFICANT CHANGE UP (ref 27–34)
MCHC RBC-ENTMCNC: 28 PG — SIGNIFICANT CHANGE UP (ref 27–34)
MCHC RBC-ENTMCNC: 34.1 GM/DL — SIGNIFICANT CHANGE UP (ref 32–36)
MCHC RBC-ENTMCNC: 34.1 GM/DL — SIGNIFICANT CHANGE UP (ref 32–36)
MCHC RBC-ENTMCNC: 34.3 GM/DL — SIGNIFICANT CHANGE UP (ref 32–36)
MCHC RBC-ENTMCNC: 34.3 GM/DL — SIGNIFICANT CHANGE UP (ref 32–36)
MCV RBC AUTO: 81.1 FL — SIGNIFICANT CHANGE UP (ref 80–100)
MCV RBC AUTO: 81.1 FL — SIGNIFICANT CHANGE UP (ref 80–100)
MCV RBC AUTO: 81.5 FL — SIGNIFICANT CHANGE UP (ref 80–100)
MCV RBC AUTO: 81.5 FL — SIGNIFICANT CHANGE UP (ref 80–100)
MONOCYTES # BLD AUTO: 0.3 K/UL — SIGNIFICANT CHANGE UP (ref 0–0.9)
MONOCYTES # BLD AUTO: 0.3 K/UL — SIGNIFICANT CHANGE UP (ref 0–0.9)
MONOCYTES # BLD AUTO: 0.31 K/UL — SIGNIFICANT CHANGE UP (ref 0–0.9)
MONOCYTES # BLD AUTO: 0.31 K/UL — SIGNIFICANT CHANGE UP (ref 0–0.9)
MONOCYTES NFR BLD AUTO: 3.7 % — SIGNIFICANT CHANGE UP (ref 2–14)
MONOCYTES NFR BLD AUTO: 3.7 % — SIGNIFICANT CHANGE UP (ref 2–14)
MONOCYTES NFR BLD AUTO: 4.8 % — SIGNIFICANT CHANGE UP (ref 2–14)
MONOCYTES NFR BLD AUTO: 4.8 % — SIGNIFICANT CHANGE UP (ref 2–14)
NEUTROPHILS # BLD AUTO: 5.12 K/UL — SIGNIFICANT CHANGE UP (ref 1.8–7.4)
NEUTROPHILS # BLD AUTO: 5.12 K/UL — SIGNIFICANT CHANGE UP (ref 1.8–7.4)
NEUTROPHILS # BLD AUTO: 6.98 K/UL — SIGNIFICANT CHANGE UP (ref 1.8–7.4)
NEUTROPHILS # BLD AUTO: 6.98 K/UL — SIGNIFICANT CHANGE UP (ref 1.8–7.4)
NEUTROPHILS NFR BLD AUTO: 79.1 % — HIGH (ref 43–77)
NEUTROPHILS NFR BLD AUTO: 79.1 % — HIGH (ref 43–77)
NEUTROPHILS NFR BLD AUTO: 86.8 % — HIGH (ref 43–77)
NEUTROPHILS NFR BLD AUTO: 86.8 % — HIGH (ref 43–77)
NITRITE UR-MCNC: NEGATIVE — SIGNIFICANT CHANGE UP
NITRITE UR-MCNC: NEGATIVE — SIGNIFICANT CHANGE UP
NRBC # BLD: 0 /100 WBCS — SIGNIFICANT CHANGE UP (ref 0–0)
PH UR: 6 — SIGNIFICANT CHANGE UP (ref 5–8)
PH UR: 6 — SIGNIFICANT CHANGE UP (ref 5–8)
PLATELET # BLD AUTO: 230 K/UL — SIGNIFICANT CHANGE UP (ref 150–400)
PLATELET # BLD AUTO: 230 K/UL — SIGNIFICANT CHANGE UP (ref 150–400)
PLATELET # BLD AUTO: 239 K/UL — SIGNIFICANT CHANGE UP (ref 150–400)
PLATELET # BLD AUTO: 239 K/UL — SIGNIFICANT CHANGE UP (ref 150–400)
POTASSIUM SERPL-MCNC: 3.7 MMOL/L — SIGNIFICANT CHANGE UP (ref 3.5–5.3)
POTASSIUM SERPL-MCNC: 3.7 MMOL/L — SIGNIFICANT CHANGE UP (ref 3.5–5.3)
POTASSIUM SERPL-MCNC: 4.1 MMOL/L — SIGNIFICANT CHANGE UP (ref 3.5–5.3)
POTASSIUM SERPL-MCNC: 4.1 MMOL/L — SIGNIFICANT CHANGE UP (ref 3.5–5.3)
POTASSIUM SERPL-SCNC: 3.7 MMOL/L — SIGNIFICANT CHANGE UP (ref 3.5–5.3)
POTASSIUM SERPL-SCNC: 3.7 MMOL/L — SIGNIFICANT CHANGE UP (ref 3.5–5.3)
POTASSIUM SERPL-SCNC: 4.1 MMOL/L — SIGNIFICANT CHANGE UP (ref 3.5–5.3)
POTASSIUM SERPL-SCNC: 4.1 MMOL/L — SIGNIFICANT CHANGE UP (ref 3.5–5.3)
PROT SERPL-MCNC: 6.9 G/DL — SIGNIFICANT CHANGE UP (ref 6–8.3)
PROT SERPL-MCNC: 6.9 G/DL — SIGNIFICANT CHANGE UP (ref 6–8.3)
PROT SERPL-MCNC: 7 G/DL — SIGNIFICANT CHANGE UP (ref 6–8.3)
PROT SERPL-MCNC: 7 G/DL — SIGNIFICANT CHANGE UP (ref 6–8.3)
PROT UR-MCNC: 30 MG/DL
PROT UR-MCNC: 30 MG/DL
PROTHROM AB SERPL-ACNC: 9.9 SEC — SIGNIFICANT CHANGE UP (ref 9.5–13)
PROTHROM AB SERPL-ACNC: 9.9 SEC — SIGNIFICANT CHANGE UP (ref 9.5–13)
RBC # BLD: 4.43 M/UL — SIGNIFICANT CHANGE UP (ref 3.8–5.2)
RBC # BLD: 4.43 M/UL — SIGNIFICANT CHANGE UP (ref 3.8–5.2)
RBC # BLD: 4.56 M/UL — SIGNIFICANT CHANGE UP (ref 3.8–5.2)
RBC # BLD: 4.56 M/UL — SIGNIFICANT CHANGE UP (ref 3.8–5.2)
RBC # FLD: 13.9 % — SIGNIFICANT CHANGE UP (ref 10.3–14.5)
RBC # FLD: 13.9 % — SIGNIFICANT CHANGE UP (ref 10.3–14.5)
RBC # FLD: 14 % — SIGNIFICANT CHANGE UP (ref 10.3–14.5)
RBC # FLD: 14 % — SIGNIFICANT CHANGE UP (ref 10.3–14.5)
SODIUM SERPL-SCNC: 137 MMOL/L — SIGNIFICANT CHANGE UP (ref 135–145)
SODIUM SERPL-SCNC: 137 MMOL/L — SIGNIFICANT CHANGE UP (ref 135–145)
SODIUM SERPL-SCNC: 138 MMOL/L — SIGNIFICANT CHANGE UP (ref 135–145)
SODIUM SERPL-SCNC: 138 MMOL/L — SIGNIFICANT CHANGE UP (ref 135–145)
SP GR SPEC: 1.02 — SIGNIFICANT CHANGE UP (ref 1–1.03)
SP GR SPEC: 1.02 — SIGNIFICANT CHANGE UP (ref 1–1.03)
UROBILINOGEN FLD QL: 4 MG/DL (ref 0.2–1)
UROBILINOGEN FLD QL: 4 MG/DL (ref 0.2–1)
WBC # BLD: 6.47 K/UL — SIGNIFICANT CHANGE UP (ref 3.8–10.5)
WBC # BLD: 6.47 K/UL — SIGNIFICANT CHANGE UP (ref 3.8–10.5)
WBC # BLD: 8.05 K/UL — SIGNIFICANT CHANGE UP (ref 3.8–10.5)
WBC # BLD: 8.05 K/UL — SIGNIFICANT CHANGE UP (ref 3.8–10.5)
WBC # FLD AUTO: 6.47 K/UL — SIGNIFICANT CHANGE UP (ref 3.8–10.5)
WBC # FLD AUTO: 6.47 K/UL — SIGNIFICANT CHANGE UP (ref 3.8–10.5)
WBC # FLD AUTO: 8.05 K/UL — SIGNIFICANT CHANGE UP (ref 3.8–10.5)
WBC # FLD AUTO: 8.05 K/UL — SIGNIFICANT CHANGE UP (ref 3.8–10.5)

## 2023-12-03 PROCEDURE — 76818 FETAL BIOPHYS PROFILE W/NST: CPT | Mod: 26

## 2023-12-03 PROCEDURE — 76815 OB US LIMITED FETUS(S): CPT | Mod: 26

## 2023-12-03 RX ORDER — SODIUM CHLORIDE 9 MG/ML
1000 INJECTION, SOLUTION INTRAVENOUS
Refills: 0 | Status: DISCONTINUED | OUTPATIENT
Start: 2023-12-03 | End: 2023-12-04

## 2023-12-03 RX ORDER — ONDANSETRON 8 MG/1
4 TABLET, FILM COATED ORAL EVERY 4 HOURS
Refills: 0 | Status: DISCONTINUED | OUTPATIENT
Start: 2023-12-03 | End: 2023-12-04

## 2023-12-03 RX ORDER — ACETAMINOPHEN 500 MG
1000 TABLET ORAL ONCE
Refills: 0 | Status: COMPLETED | OUTPATIENT
Start: 2023-12-03 | End: 2023-12-03

## 2023-12-03 RX ORDER — SODIUM CHLORIDE 9 MG/ML
1000 INJECTION, SOLUTION INTRAVENOUS
Refills: 0 | Status: COMPLETED | OUTPATIENT
Start: 2023-12-03 | End: 2023-12-03

## 2023-12-03 RX ORDER — SODIUM CHLORIDE 9 MG/ML
1000 INJECTION, SOLUTION INTRAVENOUS ONCE
Refills: 0 | Status: COMPLETED | OUTPATIENT
Start: 2023-12-03 | End: 2023-12-03

## 2023-12-03 RX ORDER — ONDANSETRON 8 MG/1
4 TABLET, FILM COATED ORAL ONCE
Refills: 0 | Status: COMPLETED | OUTPATIENT
Start: 2023-12-03 | End: 2023-12-03

## 2023-12-03 RX ORDER — OXYTOCIN 10 UNIT/ML
333.33 VIAL (ML) INJECTION
Qty: 20 | Refills: 0 | Status: DISCONTINUED | OUTPATIENT
Start: 2023-12-03 | End: 2023-12-04

## 2023-12-03 RX ORDER — ACETAMINOPHEN 500 MG
1000 TABLET ORAL ONCE
Refills: 0 | Status: DISCONTINUED | OUTPATIENT
Start: 2023-12-03 | End: 2023-12-04

## 2023-12-03 RX ORDER — CHLORHEXIDINE GLUCONATE 213 G/1000ML
1 SOLUTION TOPICAL DAILY
Refills: 0 | Status: DISCONTINUED | OUTPATIENT
Start: 2023-12-03 | End: 2023-12-04

## 2023-12-03 RX ORDER — ONDANSETRON 8 MG/1
4 TABLET, FILM COATED ORAL ONCE
Refills: 0 | Status: DISCONTINUED | OUTPATIENT
Start: 2023-12-03 | End: 2023-12-03

## 2023-12-03 RX ADMIN — Medication 400 MILLIGRAM(S): at 12:39

## 2023-12-03 RX ADMIN — Medication 1000 MILLIGRAM(S): at 20:30

## 2023-12-03 RX ADMIN — SODIUM CHLORIDE 125 MILLILITER(S): 9 INJECTION, SOLUTION INTRAVENOUS at 18:47

## 2023-12-03 RX ADMIN — SODIUM CHLORIDE 150 MILLILITER(S): 9 INJECTION, SOLUTION INTRAVENOUS at 12:39

## 2023-12-03 RX ADMIN — Medication 400 MILLIGRAM(S): at 20:00

## 2023-12-03 RX ADMIN — SODIUM CHLORIDE 2000 MILLILITER(S): 9 INJECTION, SOLUTION INTRAVENOUS at 12:39

## 2023-12-03 RX ADMIN — ONDANSETRON 4 MILLIGRAM(S): 8 TABLET, FILM COATED ORAL at 12:39

## 2023-12-03 NOTE — PATIENT PROFILE OB - FALL HARM RISK - UNIVERSAL INTERVENTIONS
Bed in lowest position, wheels locked, appropriate side rails in place/Call bell, personal items and telephone in reach/Instruct patient to call for assistance before getting out of bed or chair/Non-slip footwear when patient is out of bed/[place] to call system/Physically safe environment - no spills, clutter or unnecessary equipment/Purposeful Proactive Rounding/Room/bathroom lighting operational, light cord in reach Bed in lowest position, wheels locked, appropriate side rails in place/Call bell, personal items and telephone in reach/Instruct patient to call for assistance before getting out of bed or chair/Non-slip footwear when patient is out of bed/[place] to call system/Physically safe environment - no spills, clutter or unnecessary equipment/Purposeful Proactive Rounding/Room/bathroom lighting operational, light cord in reach

## 2023-12-04 ENCOUNTER — TRANSCRIPTION ENCOUNTER (OUTPATIENT)
Age: 23
End: 2023-12-04

## 2023-12-04 LAB
ALBUMIN SERPL ELPH-MCNC: 2.3 G/DL — LOW (ref 3.5–5)
ALBUMIN SERPL ELPH-MCNC: 2.3 G/DL — LOW (ref 3.5–5)
ALP SERPL-CCNC: 271 U/L — HIGH (ref 40–120)
ALP SERPL-CCNC: 271 U/L — HIGH (ref 40–120)
ALT FLD-CCNC: 71 U/L DA — HIGH (ref 10–60)
ALT FLD-CCNC: 71 U/L DA — HIGH (ref 10–60)
AMYLASE P1 CFR SERPL: 421 U/L — HIGH (ref 25–115)
AMYLASE P1 CFR SERPL: 421 U/L — HIGH (ref 25–115)
ANION GAP SERPL CALC-SCNC: 6 MMOL/L — SIGNIFICANT CHANGE UP (ref 5–17)
ANION GAP SERPL CALC-SCNC: 6 MMOL/L — SIGNIFICANT CHANGE UP (ref 5–17)
AST SERPL-CCNC: 44 U/L — HIGH (ref 10–40)
AST SERPL-CCNC: 44 U/L — HIGH (ref 10–40)
BASOPHILS # BLD AUTO: 0.02 K/UL — SIGNIFICANT CHANGE UP (ref 0–0.2)
BASOPHILS # BLD AUTO: 0.02 K/UL — SIGNIFICANT CHANGE UP (ref 0–0.2)
BASOPHILS NFR BLD AUTO: 0.4 % — SIGNIFICANT CHANGE UP (ref 0–2)
BASOPHILS NFR BLD AUTO: 0.4 % — SIGNIFICANT CHANGE UP (ref 0–2)
BILIRUB SERPL-MCNC: 0.7 MG/DL — SIGNIFICANT CHANGE UP (ref 0.2–1.2)
BILIRUB SERPL-MCNC: 0.7 MG/DL — SIGNIFICANT CHANGE UP (ref 0.2–1.2)
BUN SERPL-MCNC: 8 MG/DL — SIGNIFICANT CHANGE UP (ref 7–18)
BUN SERPL-MCNC: 8 MG/DL — SIGNIFICANT CHANGE UP (ref 7–18)
CALCIUM SERPL-MCNC: 8.3 MG/DL — LOW (ref 8.4–10.5)
CALCIUM SERPL-MCNC: 8.3 MG/DL — LOW (ref 8.4–10.5)
CHLORIDE SERPL-SCNC: 108 MMOL/L — SIGNIFICANT CHANGE UP (ref 96–108)
CHLORIDE SERPL-SCNC: 108 MMOL/L — SIGNIFICANT CHANGE UP (ref 96–108)
CO2 SERPL-SCNC: 24 MMOL/L — SIGNIFICANT CHANGE UP (ref 22–31)
CO2 SERPL-SCNC: 24 MMOL/L — SIGNIFICANT CHANGE UP (ref 22–31)
CREAT SERPL-MCNC: 0.55 MG/DL — SIGNIFICANT CHANGE UP (ref 0.5–1.3)
CREAT SERPL-MCNC: 0.55 MG/DL — SIGNIFICANT CHANGE UP (ref 0.5–1.3)
EGFR: 132 ML/MIN/1.73M2 — SIGNIFICANT CHANGE UP
EGFR: 132 ML/MIN/1.73M2 — SIGNIFICANT CHANGE UP
EOSINOPHIL # BLD AUTO: 0.03 K/UL — SIGNIFICANT CHANGE UP (ref 0–0.5)
EOSINOPHIL # BLD AUTO: 0.03 K/UL — SIGNIFICANT CHANGE UP (ref 0–0.5)
EOSINOPHIL NFR BLD AUTO: 0.6 % — SIGNIFICANT CHANGE UP (ref 0–6)
EOSINOPHIL NFR BLD AUTO: 0.6 % — SIGNIFICANT CHANGE UP (ref 0–6)
GLUCOSE SERPL-MCNC: 84 MG/DL — SIGNIFICANT CHANGE UP (ref 70–99)
GLUCOSE SERPL-MCNC: 84 MG/DL — SIGNIFICANT CHANGE UP (ref 70–99)
HCT VFR BLD CALC: 31.8 % — LOW (ref 34.5–45)
HCT VFR BLD CALC: 31.8 % — LOW (ref 34.5–45)
HGB BLD-MCNC: 11.1 G/DL — LOW (ref 11.5–15.5)
HGB BLD-MCNC: 11.1 G/DL — LOW (ref 11.5–15.5)
HIV 1+2 AB+HIV1 P24 AG SERPL QL IA: SIGNIFICANT CHANGE UP
HIV 1+2 AB+HIV1 P24 AG SERPL QL IA: SIGNIFICANT CHANGE UP
IMM GRANULOCYTES NFR BLD AUTO: 0.2 % — SIGNIFICANT CHANGE UP (ref 0–0.9)
IMM GRANULOCYTES NFR BLD AUTO: 0.2 % — SIGNIFICANT CHANGE UP (ref 0–0.9)
LIDOCAIN IGE QN: 474 U/L — HIGH (ref 13–75)
LIDOCAIN IGE QN: 474 U/L — HIGH (ref 13–75)
LYMPHOCYTES # BLD AUTO: 1.15 K/UL — SIGNIFICANT CHANGE UP (ref 1–3.3)
LYMPHOCYTES # BLD AUTO: 1.15 K/UL — SIGNIFICANT CHANGE UP (ref 1–3.3)
LYMPHOCYTES # BLD AUTO: 24 % — SIGNIFICANT CHANGE UP (ref 13–44)
LYMPHOCYTES # BLD AUTO: 24 % — SIGNIFICANT CHANGE UP (ref 13–44)
MCHC RBC-ENTMCNC: 27.9 PG — SIGNIFICANT CHANGE UP (ref 27–34)
MCHC RBC-ENTMCNC: 27.9 PG — SIGNIFICANT CHANGE UP (ref 27–34)
MCHC RBC-ENTMCNC: 34.9 GM/DL — SIGNIFICANT CHANGE UP (ref 32–36)
MCHC RBC-ENTMCNC: 34.9 GM/DL — SIGNIFICANT CHANGE UP (ref 32–36)
MCV RBC AUTO: 79.9 FL — LOW (ref 80–100)
MCV RBC AUTO: 79.9 FL — LOW (ref 80–100)
MONOCYTES # BLD AUTO: 0.33 K/UL — SIGNIFICANT CHANGE UP (ref 0–0.9)
MONOCYTES # BLD AUTO: 0.33 K/UL — SIGNIFICANT CHANGE UP (ref 0–0.9)
MONOCYTES NFR BLD AUTO: 6.9 % — SIGNIFICANT CHANGE UP (ref 2–14)
MONOCYTES NFR BLD AUTO: 6.9 % — SIGNIFICANT CHANGE UP (ref 2–14)
NEUTROPHILS # BLD AUTO: 3.26 K/UL — SIGNIFICANT CHANGE UP (ref 1.8–7.4)
NEUTROPHILS # BLD AUTO: 3.26 K/UL — SIGNIFICANT CHANGE UP (ref 1.8–7.4)
NEUTROPHILS NFR BLD AUTO: 67.9 % — SIGNIFICANT CHANGE UP (ref 43–77)
NEUTROPHILS NFR BLD AUTO: 67.9 % — SIGNIFICANT CHANGE UP (ref 43–77)
NRBC # BLD: 0 /100 WBCS — SIGNIFICANT CHANGE UP (ref 0–0)
NRBC # BLD: 0 /100 WBCS — SIGNIFICANT CHANGE UP (ref 0–0)
PLATELET # BLD AUTO: 213 K/UL — SIGNIFICANT CHANGE UP (ref 150–400)
PLATELET # BLD AUTO: 213 K/UL — SIGNIFICANT CHANGE UP (ref 150–400)
POTASSIUM SERPL-MCNC: 3.7 MMOL/L — SIGNIFICANT CHANGE UP (ref 3.5–5.3)
POTASSIUM SERPL-MCNC: 3.7 MMOL/L — SIGNIFICANT CHANGE UP (ref 3.5–5.3)
POTASSIUM SERPL-SCNC: 3.7 MMOL/L — SIGNIFICANT CHANGE UP (ref 3.5–5.3)
POTASSIUM SERPL-SCNC: 3.7 MMOL/L — SIGNIFICANT CHANGE UP (ref 3.5–5.3)
PROT SERPL-MCNC: 6.2 G/DL — SIGNIFICANT CHANGE UP (ref 6–8.3)
PROT SERPL-MCNC: 6.2 G/DL — SIGNIFICANT CHANGE UP (ref 6–8.3)
RBC # BLD: 3.98 M/UL — SIGNIFICANT CHANGE UP (ref 3.8–5.2)
RBC # BLD: 3.98 M/UL — SIGNIFICANT CHANGE UP (ref 3.8–5.2)
RBC # FLD: 14.2 % — SIGNIFICANT CHANGE UP (ref 10.3–14.5)
RBC # FLD: 14.2 % — SIGNIFICANT CHANGE UP (ref 10.3–14.5)
RUBV IGG SER-ACNC: 1.9 INDEX — SIGNIFICANT CHANGE UP
RUBV IGG SER-ACNC: 1.9 INDEX — SIGNIFICANT CHANGE UP
RUBV IGG SER-IMP: POSITIVE — SIGNIFICANT CHANGE UP
RUBV IGG SER-IMP: POSITIVE — SIGNIFICANT CHANGE UP
SODIUM SERPL-SCNC: 138 MMOL/L — SIGNIFICANT CHANGE UP (ref 135–145)
SODIUM SERPL-SCNC: 138 MMOL/L — SIGNIFICANT CHANGE UP (ref 135–145)
T PALLIDUM AB TITR SER: NEGATIVE — SIGNIFICANT CHANGE UP
T PALLIDUM AB TITR SER: NEGATIVE — SIGNIFICANT CHANGE UP
WBC # BLD: 4.8 K/UL — SIGNIFICANT CHANGE UP (ref 3.8–10.5)
WBC # BLD: 4.8 K/UL — SIGNIFICANT CHANGE UP (ref 3.8–10.5)
WBC # FLD AUTO: 4.8 K/UL — SIGNIFICANT CHANGE UP (ref 3.8–10.5)
WBC # FLD AUTO: 4.8 K/UL — SIGNIFICANT CHANGE UP (ref 3.8–10.5)

## 2023-12-04 PROCEDURE — 76815 OB US LIMITED FETUS(S): CPT

## 2023-12-04 PROCEDURE — 86900 BLOOD TYPING SEROLOGIC ABO: CPT

## 2023-12-04 PROCEDURE — 85025 COMPLETE CBC W/AUTO DIFF WBC: CPT

## 2023-12-04 PROCEDURE — 86703 HIV-1/HIV-2 1 RESULT ANTBDY: CPT

## 2023-12-04 PROCEDURE — 85384 FIBRINOGEN ACTIVITY: CPT

## 2023-12-04 PROCEDURE — 76818 FETAL BIOPHYS PROFILE W/NST: CPT

## 2023-12-04 PROCEDURE — 86762 RUBELLA ANTIBODY: CPT

## 2023-12-04 PROCEDURE — 81001 URINALYSIS AUTO W/SCOPE: CPT

## 2023-12-04 PROCEDURE — 83690 ASSAY OF LIPASE: CPT

## 2023-12-04 PROCEDURE — 85610 PROTHROMBIN TIME: CPT

## 2023-12-04 PROCEDURE — 36415 COLL VENOUS BLD VENIPUNCTURE: CPT

## 2023-12-04 PROCEDURE — 86850 RBC ANTIBODY SCREEN: CPT

## 2023-12-04 PROCEDURE — 86901 BLOOD TYPING SEROLOGIC RH(D): CPT

## 2023-12-04 PROCEDURE — 82150 ASSAY OF AMYLASE: CPT

## 2023-12-04 PROCEDURE — 85730 THROMBOPLASTIN TIME PARTIAL: CPT

## 2023-12-04 PROCEDURE — 87340 HEPATITIS B SURFACE AG IA: CPT

## 2023-12-04 PROCEDURE — 80053 COMPREHEN METABOLIC PANEL: CPT

## 2023-12-04 PROCEDURE — 87389 HIV-1 AG W/HIV-1&-2 AB AG IA: CPT

## 2023-12-04 PROCEDURE — 86780 TREPONEMA PALLIDUM: CPT

## 2023-12-04 RX ADMIN — SODIUM CHLORIDE 125 MILLILITER(S): 9 INJECTION, SOLUTION INTRAVENOUS at 10:30

## 2023-12-04 NOTE — DISCHARGE NOTE ANTEPARTUM - HOSPITAL COURSE
admitted overnight for N/V/epigastric pain  Mildly elevated LFTS, stable  Elevated amylase/lipase c/w pancreatitis now downtrending  pt currently without abd pain, tolerating PO with no N/V

## 2023-12-04 NOTE — DISCHARGE NOTE ANTEPARTUM - PROVIDER TOKENS
FREE:[LAST:[[facility]],PHONE:[(   )    -],FAX:[(   )    -],ADDRESS:[SEE THEM TOMORROW]] FREE:[LAST:[[facility]],PHONE:[(   )    -],FAX:[(   )    -],ADDRESS:[SEE THEM TOMORROW]]

## 2023-12-04 NOTE — DISCHARGE NOTE ANTEPARTUM - CARE PROVIDER_API CALL
[facility],   SEE THEM TOMORROW  Phone: (   )    -  Fax: (   )    -  Follow Up Time:    [facility],   SEE THEM TOMORROW  Phone: (   )    -  Fax: (   )    -  Follow Up Time:

## 2023-12-04 NOTE — DISCHARGE NOTE ANTEPARTUM - PATIENT PORTAL LINK FT
You can access the FollowMyHealth Patient Portal offered by [facility] by registering at the following website: [email or web address]. By joining [facility]’s FollowMyHealth portal, you will also be able to view your health information using other applications (apps) compatible with our system. You can access the FollowMyHealth Patient Portal offered by [facility] by registering at the following website: [email or web address]. By joining [facility]’s FollowMyHealth portal, you will also be able to view your health information using other applications (apps) compatible with our system.

## 2023-12-04 NOTE — DISCHARGE NOTE ANTEPARTUM - CARE PLAN
1 Principal Discharge DX:	Gallstone pancreatitis  Assessment and plan of treatment:	avoid fatty foods  follow up with surgery and gastroenterology postpartum  Secondary Diagnosis:	38 weeks gestation of pregnancy  Assessment and plan of treatment:	follow up with OB clinic in [facility] tomorr   Principal Discharge DX:	Gallstone pancreatitis  Assessment and plan of treatment:	avoid fatty foods  follow up with surgery and gastroenterology postpartum  Secondary Diagnosis:	38 weeks gestation of pregnancy  Assessment and plan of treatment:	follow up with OB clinic in [facility] tomorr   Principal Discharge DX:	Gallstone pancreatitis  Assessment and plan of treatment:	avoid fatty foods  follow up with surgery and gastroenterology postpartum  return if pain returns or if nausea/vomiting or fever  Secondary Diagnosis:	38 weeks gestation of pregnancy  Assessment and plan of treatment:	follow up with OB clinic in [facility] tomorrow  return if regular, painful contractions or if your water breaks or if you have VB or if you don't feel baby move   Principal Discharge DX:	Gallstone pancreatitis  Assessment and plan of treatment:	avoid fatty foods  follow up with surgery and gastroenterology postpartum  return if pain returns or if nausea/vomiting or fever  Secondary Diagnosis:	38 weeks gestation of pregnancy  Assessment and plan of treatment:	follow up with OB clinic in [facility] tomorrow  return if regular, painful contractions or if your water breaks or if you have VB or if you don't feel baby move

## 2023-12-04 NOTE — DISCHARGE NOTE ANTEPARTUM - NS MD DC FALL RISK RISK
For information on Fall & Injury Prevention, visit: [email or web address] OR  [email or web address] OR  https://www.cdc.gov/steadi/patient.html For information on Fall & Injury Prevention, visit: [email or web address] OR  [email or web address] OR  https://www.cdc.gov/steadi/patient.html

## 2023-12-04 NOTE — DISCHARGE NOTE ANTEPARTUM - PLAN OF CARE
avoid fatty foods  follow up with surgery and gastroenterology postpartum follow up with OB clinic in [facility] tomorrow follow up with OB clinic in [facility] tomorrow avoid fatty foods  follow up with surgery and gastroenterology postpartum  return if pain returns or if nausea/vomiting or fever follow up with OB clinic in [facility] tomorrow  return if regular, painful contractions or if your water breaks or if you have VB or if you don't feel baby move follow up with OB clinic in [facility] tomorrow  return if regular, painful contractions or if your water breaks or if you have VB or if you don't feel baby move

## 2023-12-15 ENCOUNTER — OUTPATIENT (OUTPATIENT)
Dept: OUTPATIENT SERVICES | Facility: HOSPITAL | Age: 23
LOS: 1 days | End: 2023-12-15
Payer: MEDICAID

## 2023-12-15 VITALS
SYSTOLIC BLOOD PRESSURE: 112 MMHG | RESPIRATION RATE: 16 BRPM | OXYGEN SATURATION: 99 % | DIASTOLIC BLOOD PRESSURE: 66 MMHG | HEART RATE: 67 BPM | TEMPERATURE: 98 F

## 2023-12-15 DIAGNOSIS — O26.899 OTHER SPECIFIED PREGNANCY RELATED CONDITIONS, UNSPECIFIED TRIMESTER: ICD-10-CM

## 2023-12-15 PROCEDURE — 76815 OB US LIMITED FETUS(S): CPT | Mod: 26

## 2023-12-15 PROCEDURE — 76819 FETAL BIOPHYS PROFIL W/O NST: CPT | Mod: 26

## 2023-12-15 PROCEDURE — G0463: CPT

## 2023-12-15 PROCEDURE — 76819 FETAL BIOPHYS PROFIL W/O NST: CPT

## 2023-12-15 PROCEDURE — 59025 FETAL NON-STRESS TEST: CPT

## 2023-12-15 PROCEDURE — 99221 1ST HOSP IP/OBS SF/LOW 40: CPT

## 2023-12-15 PROCEDURE — 76815 OB US LIMITED FETUS(S): CPT

## 2023-12-15 NOTE — OB PROVIDER TRIAGE NOTE - NS_DISCHARGEPRINT_OBGYN_ALL_OB
[facility] [facility] OB Triage Discharge Instructions [place] OB Triage Discharge Instructions

## 2023-12-15 NOTE — OB PROVIDER TRIAGE NOTE - NSOBPROVIDERNOTE_OBGYN_ALL_OB_FT
iup @ 40.3wks, postdates; reassuring fetal and maternal status  continue monitoring  BPP with EFW iup @ 40.3wks, postdates; reassuring fetal and maternal status  continue monitoring  BPP with EFW - BPP 8/8.

## 2023-12-15 NOTE — OB PROVIDER TRIAGE NOTE - NSHPPHYSICALEXAM_GEN_ALL_CORE
Pt appears comfortable and in NAD  Abd: gravid, soft, NT; no guarding or rebound  Ext: soft , NT; No edema  SVE: 1/long/-3

## 2023-12-15 NOTE — OB PROVIDER TRIAGE NOTE - HISTORY OF PRESENT ILLNESS
24yo  siup @ [id number], LMP 3/7/2023 STAR 23   PNC with [facility], complicated by known gallstones, admitted for observation at [facility] on 23 and 12/3/23.  Presents to L&D to "check my baby" due to post dates.  No acute complaints at this time.  Denies contractions, vaginal bleeding, leaking fluid, or any other acute issues.  +FM    22yo  siup @ [id number], LMP 3/7/2023 STAR 23   PNC with [facility], complicated by known gallstones, admitted for observation at [facility] on 23 and 12/3/23.  Presents to L&D to "check my baby" due to post dates.  No acute complaints at this time.  Denies contractions, vaginal bleeding, leaking fluid, or any other acute issues.  +FM

## 2023-12-15 NOTE — OB PROVIDER TRIAGE NOTE - ADDITIONAL INSTRUCTIONS
Patient to be discharged home with strict precautions  Report back to triage with vaginal bleeding, leakage of fluid, decreased fetal movement, abdominal or pelvic pain or any other concerns  Follow up in office as scheduled  Kick counts  Follow up Monday for repeat NST/BPP

## 2023-12-15 NOTE — OB PROVIDER TRIAGE NOTE - NS_STATION_OBGYN_ALL_OB_NU
Quality 110: Preventive Care And Screening: Influenza Immunization: Influenza Immunization Administered during Influenza season
Quality 226: Preventive Care And Screening: Tobacco Use: Screening And Cessation Intervention: Patient screened for tobacco use and is an ex/non-smoker
Quality 111:Pneumonia Vaccination Status For Older Adults: Patient received any pneumococcal conjugate or polysaccharide vaccine on or after their 60th birthday and before the end of the measurement period
Detail Level: Detailed
-3

## 2023-12-25 ENCOUNTER — INPATIENT (INPATIENT)
Facility: HOSPITAL | Age: 23
LOS: 1 days | Discharge: HOME | DRG: 776 | End: 2023-12-27
Attending: STUDENT IN AN ORGANIZED HEALTH CARE EDUCATION/TRAINING PROGRAM | Admitting: STUDENT IN AN ORGANIZED HEALTH CARE EDUCATION/TRAINING PROGRAM
Payer: MEDICAID

## 2023-12-25 VITALS
RESPIRATION RATE: 16 BRPM | WEIGHT: 175.05 LBS | DIASTOLIC BLOOD PRESSURE: 88 MMHG | HEIGHT: 64 IN | TEMPERATURE: 98 F | HEART RATE: 76 BPM | OXYGEN SATURATION: 98 % | SYSTOLIC BLOOD PRESSURE: 128 MMHG

## 2023-12-25 LAB
ALBUMIN SERPL ELPH-MCNC: 2.9 G/DL — LOW (ref 3.5–5)
ALBUMIN SERPL ELPH-MCNC: 2.9 G/DL — LOW (ref 3.5–5)
ALP SERPL-CCNC: 329 U/L — HIGH (ref 40–120)
ALP SERPL-CCNC: 329 U/L — HIGH (ref 40–120)
ALT FLD-CCNC: 104 U/L DA — HIGH (ref 10–60)
ALT FLD-CCNC: 104 U/L DA — HIGH (ref 10–60)
ANION GAP SERPL CALC-SCNC: 7 MMOL/L — SIGNIFICANT CHANGE UP (ref 5–17)
ANION GAP SERPL CALC-SCNC: 7 MMOL/L — SIGNIFICANT CHANGE UP (ref 5–17)
AST SERPL-CCNC: 86 U/L — HIGH (ref 10–40)
AST SERPL-CCNC: 86 U/L — HIGH (ref 10–40)
BASOPHILS # BLD AUTO: 0.04 K/UL — SIGNIFICANT CHANGE UP (ref 0–0.2)
BASOPHILS # BLD AUTO: 0.04 K/UL — SIGNIFICANT CHANGE UP (ref 0–0.2)
BASOPHILS NFR BLD AUTO: 0.4 % — SIGNIFICANT CHANGE UP (ref 0–2)
BASOPHILS NFR BLD AUTO: 0.4 % — SIGNIFICANT CHANGE UP (ref 0–2)
BILIRUB SERPL-MCNC: 0.4 MG/DL — SIGNIFICANT CHANGE UP (ref 0.2–1.2)
BILIRUB SERPL-MCNC: 0.4 MG/DL — SIGNIFICANT CHANGE UP (ref 0.2–1.2)
BLD GP AB SCN SERPL QL: SIGNIFICANT CHANGE UP
BLD GP AB SCN SERPL QL: SIGNIFICANT CHANGE UP
BUN SERPL-MCNC: 15 MG/DL — SIGNIFICANT CHANGE UP (ref 7–18)
BUN SERPL-MCNC: 15 MG/DL — SIGNIFICANT CHANGE UP (ref 7–18)
CALCIUM SERPL-MCNC: 8.3 MG/DL — LOW (ref 8.4–10.5)
CALCIUM SERPL-MCNC: 8.3 MG/DL — LOW (ref 8.4–10.5)
CHLORIDE SERPL-SCNC: 109 MMOL/L — HIGH (ref 96–108)
CHLORIDE SERPL-SCNC: 109 MMOL/L — HIGH (ref 96–108)
CO2 SERPL-SCNC: 24 MMOL/L — SIGNIFICANT CHANGE UP (ref 22–31)
CO2 SERPL-SCNC: 24 MMOL/L — SIGNIFICANT CHANGE UP (ref 22–31)
CREAT SERPL-MCNC: 0.63 MG/DL — SIGNIFICANT CHANGE UP (ref 0.5–1.3)
CREAT SERPL-MCNC: 0.63 MG/DL — SIGNIFICANT CHANGE UP (ref 0.5–1.3)
EGFR: 128 ML/MIN/1.73M2 — SIGNIFICANT CHANGE UP
EGFR: 128 ML/MIN/1.73M2 — SIGNIFICANT CHANGE UP
EOSINOPHIL # BLD AUTO: 0.08 K/UL — SIGNIFICANT CHANGE UP (ref 0–0.5)
EOSINOPHIL # BLD AUTO: 0.08 K/UL — SIGNIFICANT CHANGE UP (ref 0–0.5)
EOSINOPHIL NFR BLD AUTO: 0.8 % — SIGNIFICANT CHANGE UP (ref 0–6)
EOSINOPHIL NFR BLD AUTO: 0.8 % — SIGNIFICANT CHANGE UP (ref 0–6)
GLUCOSE SERPL-MCNC: 113 MG/DL — HIGH (ref 70–99)
GLUCOSE SERPL-MCNC: 113 MG/DL — HIGH (ref 70–99)
HCG SERPL-ACNC: 35 MIU/ML — HIGH
HCG SERPL-ACNC: 35 MIU/ML — HIGH
HCT VFR BLD CALC: 37.4 % — SIGNIFICANT CHANGE UP (ref 34.5–45)
HCT VFR BLD CALC: 37.4 % — SIGNIFICANT CHANGE UP (ref 34.5–45)
HGB BLD-MCNC: 12.8 G/DL — SIGNIFICANT CHANGE UP (ref 11.5–15.5)
HGB BLD-MCNC: 12.8 G/DL — SIGNIFICANT CHANGE UP (ref 11.5–15.5)
HIV 1 & 2 AB SERPL IA.RAPID: SIGNIFICANT CHANGE UP
HIV 1 & 2 AB SERPL IA.RAPID: SIGNIFICANT CHANGE UP
IMM GRANULOCYTES NFR BLD AUTO: 0.4 % — SIGNIFICANT CHANGE UP (ref 0–0.9)
IMM GRANULOCYTES NFR BLD AUTO: 0.4 % — SIGNIFICANT CHANGE UP (ref 0–0.9)
INR BLD: 0.95 RATIO — SIGNIFICANT CHANGE UP (ref 0.85–1.18)
INR BLD: 0.95 RATIO — SIGNIFICANT CHANGE UP (ref 0.85–1.18)
LACTATE SERPL-SCNC: 1.4 MMOL/L — SIGNIFICANT CHANGE UP (ref 0.7–2)
LACTATE SERPL-SCNC: 1.4 MMOL/L — SIGNIFICANT CHANGE UP (ref 0.7–2)
LIDOCAIN IGE QN: >5000 U/L — HIGH (ref 13–75)
LIDOCAIN IGE QN: >5000 U/L — HIGH (ref 13–75)
LYMPHOCYTES # BLD AUTO: 1.25 K/UL — SIGNIFICANT CHANGE UP (ref 1–3.3)
LYMPHOCYTES # BLD AUTO: 1.25 K/UL — SIGNIFICANT CHANGE UP (ref 1–3.3)
LYMPHOCYTES # BLD AUTO: 12.5 % — LOW (ref 13–44)
LYMPHOCYTES # BLD AUTO: 12.5 % — LOW (ref 13–44)
MCHC RBC-ENTMCNC: 28 PG — SIGNIFICANT CHANGE UP (ref 27–34)
MCHC RBC-ENTMCNC: 28 PG — SIGNIFICANT CHANGE UP (ref 27–34)
MCHC RBC-ENTMCNC: 34.2 GM/DL — SIGNIFICANT CHANGE UP (ref 32–36)
MCHC RBC-ENTMCNC: 34.2 GM/DL — SIGNIFICANT CHANGE UP (ref 32–36)
MCV RBC AUTO: 81.8 FL — SIGNIFICANT CHANGE UP (ref 80–100)
MCV RBC AUTO: 81.8 FL — SIGNIFICANT CHANGE UP (ref 80–100)
MONOCYTES # BLD AUTO: 0.59 K/UL — SIGNIFICANT CHANGE UP (ref 0–0.9)
MONOCYTES # BLD AUTO: 0.59 K/UL — SIGNIFICANT CHANGE UP (ref 0–0.9)
MONOCYTES NFR BLD AUTO: 5.9 % — SIGNIFICANT CHANGE UP (ref 2–14)
MONOCYTES NFR BLD AUTO: 5.9 % — SIGNIFICANT CHANGE UP (ref 2–14)
NEUTROPHILS # BLD AUTO: 8.01 K/UL — HIGH (ref 1.8–7.4)
NEUTROPHILS # BLD AUTO: 8.01 K/UL — HIGH (ref 1.8–7.4)
NEUTROPHILS NFR BLD AUTO: 80 % — HIGH (ref 43–77)
NEUTROPHILS NFR BLD AUTO: 80 % — HIGH (ref 43–77)
NRBC # BLD: 0 /100 WBCS — SIGNIFICANT CHANGE UP (ref 0–0)
NRBC # BLD: 0 /100 WBCS — SIGNIFICANT CHANGE UP (ref 0–0)
PLATELET # BLD AUTO: 347 K/UL — SIGNIFICANT CHANGE UP (ref 150–400)
PLATELET # BLD AUTO: 347 K/UL — SIGNIFICANT CHANGE UP (ref 150–400)
POTASSIUM SERPL-MCNC: 3.7 MMOL/L — SIGNIFICANT CHANGE UP (ref 3.5–5.3)
POTASSIUM SERPL-MCNC: 3.7 MMOL/L — SIGNIFICANT CHANGE UP (ref 3.5–5.3)
POTASSIUM SERPL-SCNC: 3.7 MMOL/L — SIGNIFICANT CHANGE UP (ref 3.5–5.3)
POTASSIUM SERPL-SCNC: 3.7 MMOL/L — SIGNIFICANT CHANGE UP (ref 3.5–5.3)
PROT SERPL-MCNC: 7.3 G/DL — SIGNIFICANT CHANGE UP (ref 6–8.3)
PROT SERPL-MCNC: 7.3 G/DL — SIGNIFICANT CHANGE UP (ref 6–8.3)
PROTHROM AB SERPL-ACNC: 10.8 SEC — SIGNIFICANT CHANGE UP (ref 9.5–13)
PROTHROM AB SERPL-ACNC: 10.8 SEC — SIGNIFICANT CHANGE UP (ref 9.5–13)
RBC # BLD: 4.57 M/UL — SIGNIFICANT CHANGE UP (ref 3.8–5.2)
RBC # BLD: 4.57 M/UL — SIGNIFICANT CHANGE UP (ref 3.8–5.2)
RBC # FLD: 13.6 % — SIGNIFICANT CHANGE UP (ref 10.3–14.5)
RBC # FLD: 13.6 % — SIGNIFICANT CHANGE UP (ref 10.3–14.5)
SODIUM SERPL-SCNC: 140 MMOL/L — SIGNIFICANT CHANGE UP (ref 135–145)
SODIUM SERPL-SCNC: 140 MMOL/L — SIGNIFICANT CHANGE UP (ref 135–145)
TROPONIN I, HIGH SENSITIVITY RESULT: 5.3 NG/L — SIGNIFICANT CHANGE UP
TROPONIN I, HIGH SENSITIVITY RESULT: 5.3 NG/L — SIGNIFICANT CHANGE UP
WBC # BLD: 10.01 K/UL — SIGNIFICANT CHANGE UP (ref 3.8–10.5)
WBC # BLD: 10.01 K/UL — SIGNIFICANT CHANGE UP (ref 3.8–10.5)
WBC # FLD AUTO: 10.01 K/UL — SIGNIFICANT CHANGE UP (ref 3.8–10.5)
WBC # FLD AUTO: 10.01 K/UL — SIGNIFICANT CHANGE UP (ref 3.8–10.5)

## 2023-12-25 PROCEDURE — 99285 EMERGENCY DEPT VISIT HI MDM: CPT

## 2023-12-25 PROCEDURE — 74177 CT ABD & PELVIS W/CONTRAST: CPT | Mod: 26,MA

## 2023-12-25 PROCEDURE — 99223 1ST HOSP IP/OBS HIGH 75: CPT | Mod: GC

## 2023-12-25 RX ORDER — MORPHINE SULFATE 50 MG/1
2 CAPSULE, EXTENDED RELEASE ORAL EVERY 4 HOURS
Refills: 0 | Status: DISCONTINUED | OUTPATIENT
Start: 2023-12-25 | End: 2023-12-27

## 2023-12-25 RX ORDER — MORPHINE SULFATE 50 MG/1
2 CAPSULE, EXTENDED RELEASE ORAL ONCE
Refills: 0 | Status: DISCONTINUED | OUTPATIENT
Start: 2023-12-25 | End: 2023-12-25

## 2023-12-25 RX ORDER — ONDANSETRON 8 MG/1
4 TABLET, FILM COATED ORAL EVERY 8 HOURS
Refills: 0 | Status: DISCONTINUED | OUTPATIENT
Start: 2023-12-25 | End: 2023-12-27

## 2023-12-25 RX ORDER — ONDANSETRON 8 MG/1
4 TABLET, FILM COATED ORAL ONCE
Refills: 0 | Status: COMPLETED | OUTPATIENT
Start: 2023-12-25 | End: 2023-12-25

## 2023-12-25 RX ORDER — NALOXONE HYDROCHLORIDE 4 MG/.1ML
0.4 SPRAY NASAL ONCE
Refills: 0 | Status: DISCONTINUED | OUTPATIENT
Start: 2023-12-25 | End: 2023-12-27

## 2023-12-25 RX ORDER — MORPHINE SULFATE 50 MG/1
4 CAPSULE, EXTENDED RELEASE ORAL ONCE
Refills: 0 | Status: DISCONTINUED | OUTPATIENT
Start: 2023-12-25 | End: 2023-12-25

## 2023-12-25 RX ORDER — SODIUM CHLORIDE 9 MG/ML
1000 INJECTION, SOLUTION INTRAVENOUS
Refills: 0 | Status: DISCONTINUED | OUTPATIENT
Start: 2023-12-25 | End: 2023-12-27

## 2023-12-25 RX ORDER — MORPHINE SULFATE 50 MG/1
4 CAPSULE, EXTENDED RELEASE ORAL EVERY 4 HOURS
Refills: 0 | Status: DISCONTINUED | OUTPATIENT
Start: 2023-12-25 | End: 2023-12-27

## 2023-12-25 RX ORDER — ACETAMINOPHEN 500 MG
650 TABLET ORAL EVERY 6 HOURS
Refills: 0 | Status: DISCONTINUED | OUTPATIENT
Start: 2023-12-25 | End: 2023-12-26

## 2023-12-25 RX ORDER — SODIUM CHLORIDE 9 MG/ML
1000 INJECTION INTRAMUSCULAR; INTRAVENOUS; SUBCUTANEOUS ONCE
Refills: 0 | Status: COMPLETED | OUTPATIENT
Start: 2023-12-25 | End: 2023-12-25

## 2023-12-25 RX ORDER — ENOXAPARIN SODIUM 100 MG/ML
40 INJECTION SUBCUTANEOUS EVERY 24 HOURS
Refills: 0 | Status: DISCONTINUED | OUTPATIENT
Start: 2023-12-25 | End: 2023-12-26

## 2023-12-25 RX ADMIN — MORPHINE SULFATE 2 MILLIGRAM(S): 50 CAPSULE, EXTENDED RELEASE ORAL at 23:11

## 2023-12-25 RX ADMIN — ONDANSETRON 4 MILLIGRAM(S): 8 TABLET, FILM COATED ORAL at 20:16

## 2023-12-25 RX ADMIN — MORPHINE SULFATE 4 MILLIGRAM(S): 50 CAPSULE, EXTENDED RELEASE ORAL at 20:16

## 2023-12-25 RX ADMIN — SODIUM CHLORIDE 1000 MILLILITER(S): 9 INJECTION INTRAMUSCULAR; INTRAVENOUS; SUBCUTANEOUS at 20:16

## 2023-12-25 RX ADMIN — MORPHINE SULFATE 2 MILLIGRAM(S): 50 CAPSULE, EXTENDED RELEASE ORAL at 23:41

## 2023-12-25 RX ADMIN — SODIUM CHLORIDE 1000 MILLILITER(S): 9 INJECTION INTRAMUSCULAR; INTRAVENOUS; SUBCUTANEOUS at 21:16

## 2023-12-25 RX ADMIN — MORPHINE SULFATE 4 MILLIGRAM(S): 50 CAPSULE, EXTENDED RELEASE ORAL at 20:56

## 2023-12-25 NOTE — H&P ADULT - HISTORY OF PRESENT ILLNESS
A 23 year old female, from home, w/ PMHx of Gallstone pancreatitis, presented to the ED complaining of rapidly progressing epigastric pain radiating to her back, 10/10 in intensity, sharp in quality, constant, and exacerbated w/ changes in body position or palpation. This is associated w/ nausea, vomiting, chills, fever, and anorexia. She has not taken any medications for this matter. Denies diarrhea, constipation, headache, chest pain, dyspnea, or palpitations. Patient had her first delivery,  on 23 at [place] w/o any complications. She does not have any other concerns at this time.  A 23 year old female, from home, w/ PMHx of Gallstone pancreatitis, presented to the ED complaining of rapidly progressing epigastric pain radiating to her back, 10/10 in intensity, sharp in quality, constant, and exacerbated w/ changes in body position or palpation. This is associated w/ nausea, vomiting, chills, fever, and anorexia. She has not taken any medications for this matter. Denies diarrhea, constipation, headache, chest pain, dyspnea, or palpitations. Patient had her first delivery,  on 23 at [place] w/o any complications. She does not have any other concerns at this time.

## 2023-12-25 NOTE — ED ADULT NURSE NOTE - NSFALLUNIVINTERV_ED_ALL_ED
Bed/Stretcher in lowest position, wheels locked, appropriate side rails in place/Call bell, personal items and telephone in reach/Instruct patient to call for assistance before getting out of bed/chair/stretcher/Non-slip footwear applied when patient is off stretcher/[place] to call system/Physically safe environment - no spills, clutter or unnecessary equipment/Purposeful proactive rounding/Room/bathroom lighting operational, light cord in reach Bed/Stretcher in lowest position, wheels locked, appropriate side rails in place/Call bell, personal items and telephone in reach/Instruct patient to call for assistance before getting out of bed/chair/stretcher/Non-slip footwear applied when patient is off stretcher/[place] to call system/Physically safe environment - no spills, clutter or unnecessary equipment/Purposeful proactive rounding/Room/bathroom lighting operational, light cord in reach

## 2023-12-25 NOTE — H&P ADULT - PROBLEM SELECTOR PLAN 2
No Hx of alcohol abuse.   CT ab/pelvis noted above  Likely secondary to cholelithiasis and acute pancreatitis.   Now downtrending  No WBC count   Patient having subjective fever and chills  Pending gamma glutamyl transferase, ESR, and CRP  May have acute cholangitis as well  F/U gamma glutamyl transferase, ESR, and CRP

## 2023-12-25 NOTE — ED ADULT NURSE NOTE - CAS EDP DISCH DISPOSITION ADMI
[facility] / [facility]

## 2023-12-25 NOTE — ED ADULT NURSE NOTE - HISTORY OF COVID-19 VACCINATION
Vital Signs: I have reviewed the initial vital signs.  Constitutional: well-nourished, appears stated age, no acute distress.  HEENT: Airway patent, protected.   CV: regular rate, regular rhythm, well-perfused extremities, 2+ b/l DP and radial pulses equal.  Lungs: BCTA, no increased WOB.  ABD: soft, mild ttp over LLQ, ND, no guarding or rebound, no pulsatile mass, no hernias.   MSK: Neck supple, nontender, nl ROM, no stepoff. Chest ttp over left anterior chest wall.. Back ttp over bilateral posterior upper ribs, nontender in TLS spine or to b/l bony structures or flanks. Ext nontender, nl rom, no deformity.   INTEG: Skin warm, dry, no rash.  NEURO: A&Ox3, moving all extremities, normal speech  PSYCH: Calm, cooperative, normal affect and interaction. Yes

## 2023-12-25 NOTE — ED PROVIDER NOTE - OBJECTIVE STATEMENT
23 female with hx of gallstones.   Pt presenting to the ED reporting diffuse upper abdominal pain similar to prior biliary colic.  Pt s/p annieearan on 12/20.

## 2023-12-25 NOTE — H&P ADULT - ASSESSMENT
A 23 year old female, from home, w/ PMHx of Gallstone pancreatitis, presented to the ED complaining of rapidly progressing epigastric pain radiating to her back, 10/10 in intensity, sharp in quality, constant, and exacerbated w/ changes in body position or palpation. This is associated w/ nausea, vomiting, chills, fever, and anorexia. Admitted to medicine for acute pancreatitis management.

## 2023-12-25 NOTE — H&P ADULT - ATTENDING COMMENTS
IMAGING  CT A/P with IV Contrast  IMPRESSION:  Cholelithiasis. Acute pancreatitis. Small peripancreatic free fluid. No organized peripancreatic fluid collection.  Prominent central intrahepatic biliary ducts. Common bile ductal dilatation. Recommend clinical correlation and additional imaging (MRCP/MR) for evaluation of biliary pathology (choledocholithiasis).  Prominent bilateral renal collecting system without hydronephrosis. Question striated bilateral nephrogram. Recommend clinical correlation to assess urinary tract infection (cystitis/pyelonephritis).  Nonspecific complex fluid and foci of air at the endometrial cavity, reflecting recent postsurgical changes. Recommend clinical correlation to assess infection/inflammation (endometritis).    HPI  23 year old Thai speaking female patient with pmhx recent  23, Hx of gallstone pancreatitis, who presented to the ER due to abdominal pain with vomiting starting 23.  The abdominal pain is in the top of her abdomen and radiates to her back. Lipase is >5000 and CT A/P shows acute pancreatitis. The patient has a history of recent gallstone pancreatitis earlier this month but was told she would have to wait until after her pregnancy to have her gallbladder removed. Patient denies alcohol use.    Review Of Systems included: + abdominal pain, + nausea, + vomiting, + chills,   no fever, no diarrhea, no constipation, no dysuria, no lightheadedness, no dizziness, no chest pain, no shortness of breath     #[id number]  Physical Exam  General: Awake, Alert, Oriented  Cardiac: RRR  Pulmonary: CTA b/l  Abdominal: Soft, ND, Tender to RUQ, LUQ, epigastric areas, + bowel sounds, Scar from  with no signs of erythema/swelling/pus.  Extremities: No edema    A/P  # Gallstone Pancreatitis  Severe Intractable Abdominal Pain with Nausea and Vomiting  Patient with severe 9/10 pain; does not feel ready for oral diet  - Aggressive IV Fluid Hydration  - Pain control IV Morphine 2mg prn for moderate pain, IV Morphine 4mg prn for severe pain  - Zofran prn for nausea/vomiting  - Check Serum Triglyceride level  - NPO, advance as tolerated  - Consult Surgery for Lap Cholecystectomy when appropriate    # Elevated LFTs  2/2 Gallstone Pancreatitis  R Factor of 0.6 suggests cholestatic injury  Patient Afebrile  - Follow up morning CMP  - Follow up RUQ U/S  - Avoid hepatotoxic agents    # DVT PPx  - Heparin, switch to SCDs if plan for surgery    # FEN  - NPO, advance as tolerated  - Monitor and replete electrolytes as needed  - Aggressive IV Fluid Hydration    Previous Admissions Included  12/15/2023: ER Visit for Postdates Surveillance  12/3/2023 - 2023: Gallstone Pancreatitis  2023 - 2023: Gallstone Pancreatitis    Patient case and management was discussed with ER Attending including that lipase was > 5000 in 2023 but downtrended and is now >5000 again showing patient has repeat gallstone pancreatitis. Recommended IV Fluid, pain control, surgery evaluation for cholecystectomy.  I did examine all labs and imaging IMAGING  CT A/P with IV Contrast  IMPRESSION:  Cholelithiasis. Acute pancreatitis. Small peripancreatic free fluid. No organized peripancreatic fluid collection.  Prominent central intrahepatic biliary ducts. Common bile ductal dilatation. Recommend clinical correlation and additional imaging (MRCP/MR) for evaluation of biliary pathology (choledocholithiasis).  Prominent bilateral renal collecting system without hydronephrosis. Question striated bilateral nephrogram. Recommend clinical correlation to assess urinary tract infection (cystitis/pyelonephritis).  Nonspecific complex fluid and foci of air at the endometrial cavity, reflecting recent postsurgical changes. Recommend clinical correlation to assess infection/inflammation (endometritis).    HPI  23 year old Mexican speaking female patient with pmhx recent  23, Hx of gallstone pancreatitis, who presented to the ER due to abdominal pain with vomiting starting 23.  The abdominal pain is in the top of her abdomen and radiates to her back. Lipase is >5000 and CT A/P shows acute pancreatitis. The patient has a history of recent gallstone pancreatitis earlier this month but was told she would have to wait until after her pregnancy to have her gallbladder removed. Patient denies alcohol use.    Review Of Systems included: + abdominal pain, + nausea, + vomiting, + chills,   no fever, no diarrhea, no constipation, no dysuria, no lightheadedness, no dizziness, no chest pain, no shortness of breath     #[id number]  Physical Exam  General: Awake, Alert, Oriented  Cardiac: RRR  Pulmonary: CTA b/l  Abdominal: Soft, ND, Tender to RUQ, LUQ, epigastric areas, + bowel sounds, Scar from  with no signs of erythema/swelling/pus.  Extremities: No edema    A/P  # Gallstone Pancreatitis  Severe Intractable Abdominal Pain with Nausea and Vomiting  Patient with severe 9/10 pain; does not feel ready for oral diet  - Aggressive IV Fluid Hydration  - Pain control IV Morphine 2mg prn for moderate pain, IV Morphine 4mg prn for severe pain  - Zofran prn for nausea/vomiting  - Check Serum Triglyceride level  - NPO, advance as tolerated  - Consult Surgery for Lap Cholecystectomy when appropriate    # Elevated LFTs  2/2 Gallstone Pancreatitis  R Factor of 0.6 suggests cholestatic injury  Patient Afebrile  - Follow up morning CMP  - Follow up RUQ U/S  - Avoid hepatotoxic agents    # DVT PPx  - Heparin, switch to SCDs if plan for surgery    # FEN  - NPO, advance as tolerated  - Monitor and replete electrolytes as needed  - Aggressive IV Fluid Hydration    Previous Admissions Included  12/15/2023: ER Visit for Postdates Surveillance  12/3/2023 - 2023: Gallstone Pancreatitis  2023 - 2023: Gallstone Pancreatitis    Patient case and management was discussed with ER Attending including that lipase was > 5000 in 2023 but downtrended and is now >5000 again showing patient has repeat gallstone pancreatitis. Recommended IV Fluid, pain control, surgery evaluation for cholecystectomy.  I did examine all labs and imaging IMAGING  CT A/P with IV Contrast  IMPRESSION:  Cholelithiasis. Acute pancreatitis. Small peripancreatic free fluid. No organized peripancreatic fluid collection.  Prominent central intrahepatic biliary ducts. Common bile ductal dilatation. Recommend clinical correlation and additional imaging (MRCP/MR) for evaluation of biliary pathology (choledocholithiasis).  Prominent bilateral renal collecting system without hydronephrosis. Question striated bilateral nephrogram. Recommend clinical correlation to assess urinary tract infection (cystitis/pyelonephritis).  Nonspecific complex fluid and foci of air at the endometrial cavity, reflecting recent postsurgical changes. Recommend clinical correlation to assess infection/inflammation (endometritis).    HPI  23 year old Gabonese speaking female patient with pmhx recent  23, Hx of gallstone pancreatitis, who presented to the ER due to abdominal pain with vomiting starting 23.  The abdominal pain is in the top of her abdomen and radiates to her back. Lipase is >5000 and CT A/P shows acute pancreatitis. The patient has a history of recent gallstone pancreatitis earlier this month but was told she would have to wait until after her pregnancy to have her gallbladder removed. Patient denies alcohol use.    Review Of Systems included: + abdominal pain, + nausea, + vomiting, + chills,   no fever, no diarrhea, no constipation, no dysuria, no lightheadedness, no dizziness, no chest pain, no shortness of breath     #[id number]  Physical Exam  General: Awake, Alert, Oriented  Cardiac: RRR  Pulmonary: CTA b/l  Abdominal: Soft, ND, Tender to RUQ, LUQ, epigastric areas, + bowel sounds, Scar from  with no signs of erythema/swelling/pus.  Extremities: No edema    A/P  # Gallstone Pancreatitis  Severe Intractable Abdominal Pain with Nausea and Vomiting  Patient with severe 9/10 pain; does not feel ready for oral diet  - Aggressive IV Fluid Hydration  - Pain control IV Morphine 2mg prn for moderate pain, IV Morphine 4mg prn for severe pain  - Zofran prn for nausea/vomiting  - Check Serum Triglyceride level  - NPO, advance as tolerated  - Consult Surgery for Lap Cholecystectomy when appropriate    # Elevated LFTs  2/2 Gallstone Pancreatitis  R Factor of 0.6 suggests cholestatic injury  Patient Afebrile  - Follow up morning CMP  - Follow up RUQ U/S  - Avoid hepatotoxic agents    # Asymptomatic Pyuria  No dysuria    # DVT PPx  - Heparin, switch to SCDs if plan for surgery    # FEN  - NPO, advance as tolerated  - Monitor and replete electrolytes as needed  - Aggressive IV Fluid Hydration    Previous Admissions Included  12/15/2023: ER Visit for Postdates Surveillance  12/3/2023 - 2023: Gallstone Pancreatitis  2023 - 2023: Gallstone Pancreatitis    Patient case and management was discussed with ER Attending including that lipase was > 5000 in 2023 but downtrended and is now >5000 again showing patient has repeat gallstone pancreatitis. Recommended IV Fluid, pain control, surgery evaluation for cholecystectomy.  I did examine all labs and imaging IMAGING  CT A/P with IV Contrast  IMPRESSION:  Cholelithiasis. Acute pancreatitis. Small peripancreatic free fluid. No organized peripancreatic fluid collection.  Prominent central intrahepatic biliary ducts. Common bile ductal dilatation. Recommend clinical correlation and additional imaging (MRCP/MR) for evaluation of biliary pathology (choledocholithiasis).  Prominent bilateral renal collecting system without hydronephrosis. Question striated bilateral nephrogram. Recommend clinical correlation to assess urinary tract infection (cystitis/pyelonephritis).  Nonspecific complex fluid and foci of air at the endometrial cavity, reflecting recent postsurgical changes. Recommend clinical correlation to assess infection/inflammation (endometritis).    HPI  23 year old Greek speaking female patient with pmhx recent  23, Hx of gallstone pancreatitis, who presented to the ER due to abdominal pain with vomiting starting 23.  The abdominal pain is in the top of her abdomen and radiates to her back. Lipase is >5000 and CT A/P shows acute pancreatitis. The patient has a history of recent gallstone pancreatitis earlier this month but was told she would have to wait until after her pregnancy to have her gallbladder removed. Patient denies alcohol use.    Review Of Systems included: + abdominal pain, + nausea, + vomiting, + chills,   no fever, no diarrhea, no constipation, no dysuria, no lightheadedness, no dizziness, no chest pain, no shortness of breath     #[id number]  Physical Exam  General: Awake, Alert, Oriented  Cardiac: RRR  Pulmonary: CTA b/l  Abdominal: Soft, ND, Tender to RUQ, LUQ, epigastric areas, + bowel sounds, Scar from  with no signs of erythema/swelling/pus.  Extremities: No edema    A/P  # Gallstone Pancreatitis  Severe Intractable Abdominal Pain with Nausea and Vomiting  Patient with severe 9/10 pain; does not feel ready for oral diet  - Aggressive IV Fluid Hydration  - Pain control IV Morphine 2mg prn for moderate pain, IV Morphine 4mg prn for severe pain  - Zofran prn for nausea/vomiting  - Check Serum Triglyceride level  - NPO, advance as tolerated  - Consult Surgery for Lap Cholecystectomy when appropriate    # Elevated LFTs  2/2 Gallstone Pancreatitis  R Factor of 0.6 suggests cholestatic injury  Patient Afebrile  - Follow up morning CMP  - Follow up RUQ U/S  - Avoid hepatotoxic agents    # Asymptomatic Pyuria  No dysuria    # DVT PPx  - Heparin, switch to SCDs if plan for surgery    # FEN  - NPO, advance as tolerated  - Monitor and replete electrolytes as needed  - Aggressive IV Fluid Hydration    Previous Admissions Included  12/15/2023: ER Visit for Postdates Surveillance  12/3/2023 - 2023: Gallstone Pancreatitis  2023 - 2023: Gallstone Pancreatitis    Patient case and management was discussed with ER Attending including that lipase was > 5000 in 2023 but downtrended and is now >5000 again showing patient has repeat gallstone pancreatitis. Recommended IV Fluid, pain control, surgery evaluation for cholecystectomy.  I did examine all labs and imaging

## 2023-12-25 NOTE — ED PROVIDER NOTE - NS ED ROS FT
Constitutional: (-) fever   HENT: (-) congestion (-) rhinorrhea (-) sore throat  Eyes: (-) pain (-) redness  Respiratory: (-) cough (-) shortness of breath (-) wheezing (-) stridor    Cardiovascular: (-) chest pain (-) palpitations (-) leg swelling  Gastrointestinal: (+) abdominal pain (-) blood in stool (no melena/hematochezia) (+) vomiting  Genitourinary: (-) dysuria (-) hematuria  Musculoskeletal: (-) gait problem (-) joint swelling (-) myalgias  Skin: (-) color change (-) rash  Neurological: (-) weakness (-) numbness (-) headaches  Psychiatric/Behavioral: (-) confusion

## 2023-12-25 NOTE — H&P ADULT - NSHPPHYSICALEXAM_GEN_ALL_CORE
PHYSICAL EXAMINATION:  GENERAL: NAD  HEAD:  Atraumatic, Normocephalic  EYES:  Conjunctiva and sclera clear, pupils are equal, round, and reactive to light and accommodation.  NECK: Supple, No JVD, trachea is midline, no evidence of thyroid enlargement, no lymphadenopathy or tenderness.  CHEST/LUNG: Clear to auscultation; No rales, rhonchi, wheezing, or rubs  HEART: Regular rate and rhythm; No murmurs, rubs, or gallops  ABDOMEN: Soft, epigastric tenderness to mild palpation w/o guarding or rebound, Nondistended; Bowel sounds present, negative Garcia's sign  NERVOUS SYSTEM:  Alert & Oriented X3; No focal sensory or motor deficits are noted; Recent and remote memory is intact; Appropriate mood and affect.  EXTREMITIES:  2+ Peripheral Pulses, No clubbing, cyanosis, or edema  SKIN: Warm, dry, and well perfused; Good turgor; No lesions, nodules or rashes are noted.     Vital Signs Last 24 Hrs  T(C): 37.1 (26 Dec 2023 00:07), Max: 37.1 (26 Dec 2023 00:07)  T(F): 98.7 (26 Dec 2023 00:07), Max: 98.7 (26 Dec 2023 00:07)  HR: 52 (26 Dec 2023 00:07) (52 - 76)  BP: 141/87 (26 Dec 2023 00:07) (128/88 - 141/87)  BP(mean): --  RR: 17 (26 Dec 2023 00:07) (16 - 17)  SpO2: 97% (26 Dec 2023 00:07) (97% - 98%)    Parameters below as of 26 Dec 2023 00:07  Patient On (Oxygen Delivery Method): room air

## 2023-12-25 NOTE — ED PROVIDER NOTE - CLINICAL SUMMARY MEDICAL DECISION MAKING FREE TEXT BOX
23 female with hx of gallstones.   Pt presenting to the ED reporting diffuse upper abdominal pain similar to prior biliary colic.   Pt s/p    Vitals stable.  Nontoxic appearing, n/v intact. +Upper abdominal tenderness.    Plan to obtain:    -Labs, US r/o cholecystitis, analgesia, antiemetics, & antipyretics as needed, IV fluids, observe/re-assess     Lab values demonstrate no acute/emergent pathology.    Independent interpretation of the EKG: Sinus @ ***, normal axis, normal intervals, normal ST/T  Independent interpretation of XR: ***    ***    Pt advised regarding need for close outpatient follow up.  Patient stable for further care in outpatient setting. No indication for inpatient admission at this time. Patient advised regarding symptomatic & supportive care and symptoms to prompt ED return. Strict return precautions provided.  ***  Patient requires inpatient admission for further care & stabilization. Care signed out to inpatient team. 23 female with hx of gallstones.   Pt presenting to the ED reporting diffuse upper abdominal pain similar to prior biliary colic.   Pt s/p    Vitals stable.  Nontoxic appearing, n/v intact. +Upper abdominal tenderness.    Plan to obtain:    -Labs, CT r/o cholecystitis, analgesia, antiemetics, & antipyretics as needed, IV fluids, observe/re-assess     Lab values w elevated lipase & increased AST/ALT/AlkPhos. Bili normal.  CT showing no cholecystitis but pancreatitis w gallstones and ductal dilatation concerning for choledocolithiasis. No pancreatic abscess.  IV fluids & analgesia given    Patient requires inpatient admission for further care & stabilization. Care signed out to inpatient team.    : [id number] 23 female with hx of gallstones.   Pt presenting to the ED reporting diffuse upper abdominal pain similar to prior biliary colic.   Pt s/p    Vitals stable.  Nontoxic appearing, n/v intact. +Upper abdominal tenderness.    Plan to obtain:    -Labs, CT r/o cholecystitis, analgesia, antiemetics, & antipyretics as needed, IV fluids, observe/re-assess     Lab values w elevated lipase & increased AST/ALT/AlkPhos. Bili normal.  CT showing no cholecystitis but pancreatitis w gallstones and ductal dilatation concerning for choledocolithiasis. No pancreatic abscess.  IV fluids & analgesia given    Patient requires inpatient admission for further care & stabilization. Care signed out to inpatient team.    : [id number]

## 2023-12-25 NOTE — ED PROVIDER NOTE - PHYSICAL EXAMINATION
Gen:  Awake, alert, NAD, WDWN, NCAT, non-toxic appearing.   Eyes:  PERRL, EOMI, no icterus, normal lids/lashes, normal conjunctivae.  ENT:  External inspection normal, pink/moist membranes.   CV:  S1S2, regular rate and rhythm, no murmur/gallops/rubs, no JVD, 2+ pulses b/l, no edema/cords/homans, warm/well-perfused.  Resp:  Normal respiratory rate/effort, no respiratory distress, normal voice, speaking full sentences, lungs clear to auscultation b/l, no wheezing/rales/rhonchi, no retractions, no stridor.  Abd:  Soft abdomen, +diffuse upper abdominal tender, no distended/guarding/rebound, no pulsatile mass, no CVA tender.   Musculoskeletal:  N/V intact, FROM all 4 extremities, normal motor tone  Neck:  FROM neck, supple, trachea midline, no meningismus.   Skin:  Color normal for race, warm and dry, no rash.  Neuro:  Oriented x3, CN 2-12 intact (grossly), normal motor (grossly), normal sensory (grossly), GCS 15  Psych:  Attention normal. Affect normal. Behavior normal. Judgment normal.

## 2023-12-25 NOTE — ED ADULT NURSE NOTE - OBJECTIVE STATEMENT
Patient presented to the ED complaining of Right and upper abdominal pain with nausea and vomiting. Patient states she had a  5 days ago.

## 2023-12-25 NOTE — H&P ADULT - PROBLEM SELECTOR PLAN 1
CT ab/pelvis showed Cholelithiasis. Acute pancreatitis. Small peripancreatic free fluid. No organized peripancreatic fluid collection. Prominent central intrahepatic biliary ducts. Common bile ductal dilatation.  Likely gallstone pancreatitis. No alcohol intake.   F/U Triglycerides levels  NPO for now  ***MORNING TEAM TO CONSULT GI FOR ERCP***  Started on Tylenol 650 mg PO q6hr PRN  Started on Morphine 2mg IV q4hr PRN for moderate pain  Started on Morphine 4mg IV q4hr PRN for severe pain   Started on Zofran 4 mg IV q8hr PRN   Started on LR at 100 cc for 24 hours

## 2023-12-26 ENCOUNTER — TRANSCRIPTION ENCOUNTER (OUTPATIENT)
Age: 23
End: 2023-12-26

## 2023-12-26 ENCOUNTER — RESULT REVIEW (OUTPATIENT)
Age: 23
End: 2023-12-26

## 2023-12-26 DIAGNOSIS — R74.01 ELEVATION OF LEVELS OF LIVER TRANSAMINASE LEVELS: ICD-10-CM

## 2023-12-26 DIAGNOSIS — Z98.891 HISTORY OF UTERINE SCAR FROM PREVIOUS SURGERY: Chronic | ICD-10-CM

## 2023-12-26 DIAGNOSIS — Z29.9 ENCOUNTER FOR PROPHYLACTIC MEASURES, UNSPECIFIED: ICD-10-CM

## 2023-12-26 DIAGNOSIS — K85.90 ACUTE PANCREATITIS WITHOUT NECROSIS OR INFECTION, UNSPECIFIED: ICD-10-CM

## 2023-12-26 PROBLEM — D64.9 ANEMIA, UNSPECIFIED: Chronic | Status: ACTIVE | Noted: 2023-12-15

## 2023-12-26 PROBLEM — K80.20 CALCULUS OF GALLBLADDER WITHOUT CHOLECYSTITIS WITHOUT OBSTRUCTION: Chronic | Status: ACTIVE | Noted: 2023-12-15

## 2023-12-26 LAB
ALBUMIN SERPL ELPH-MCNC: 2.8 G/DL — LOW (ref 3.5–5)
ALBUMIN SERPL ELPH-MCNC: 2.8 G/DL — LOW (ref 3.5–5)
ALP SERPL-CCNC: 292 U/L — HIGH (ref 40–120)
ALP SERPL-CCNC: 292 U/L — HIGH (ref 40–120)
ALT FLD-CCNC: 91 U/L DA — HIGH (ref 10–60)
ALT FLD-CCNC: 91 U/L DA — HIGH (ref 10–60)
ANION GAP SERPL CALC-SCNC: 6 MMOL/L — SIGNIFICANT CHANGE UP (ref 5–17)
ANION GAP SERPL CALC-SCNC: 6 MMOL/L — SIGNIFICANT CHANGE UP (ref 5–17)
APPEARANCE UR: ABNORMAL
APPEARANCE UR: ABNORMAL
AST SERPL-CCNC: 60 U/L — HIGH (ref 10–40)
AST SERPL-CCNC: 60 U/L — HIGH (ref 10–40)
BACTERIA # UR AUTO: ABNORMAL /HPF
BACTERIA # UR AUTO: ABNORMAL /HPF
BILIRUB SERPL-MCNC: 0.3 MG/DL — SIGNIFICANT CHANGE UP (ref 0.2–1.2)
BILIRUB SERPL-MCNC: 0.3 MG/DL — SIGNIFICANT CHANGE UP (ref 0.2–1.2)
BILIRUB UR-MCNC: NEGATIVE — SIGNIFICANT CHANGE UP
BILIRUB UR-MCNC: NEGATIVE — SIGNIFICANT CHANGE UP
BUN SERPL-MCNC: 10 MG/DL — SIGNIFICANT CHANGE UP (ref 7–18)
BUN SERPL-MCNC: 10 MG/DL — SIGNIFICANT CHANGE UP (ref 7–18)
CALCIUM SERPL-MCNC: 8.5 MG/DL — SIGNIFICANT CHANGE UP (ref 8.4–10.5)
CALCIUM SERPL-MCNC: 8.5 MG/DL — SIGNIFICANT CHANGE UP (ref 8.4–10.5)
CHLORIDE SERPL-SCNC: 112 MMOL/L — HIGH (ref 96–108)
CHLORIDE SERPL-SCNC: 112 MMOL/L — HIGH (ref 96–108)
CO2 SERPL-SCNC: 24 MMOL/L — SIGNIFICANT CHANGE UP (ref 22–31)
CO2 SERPL-SCNC: 24 MMOL/L — SIGNIFICANT CHANGE UP (ref 22–31)
COLOR SPEC: ABNORMAL
COLOR SPEC: ABNORMAL
CREAT SERPL-MCNC: 0.52 MG/DL — SIGNIFICANT CHANGE UP (ref 0.5–1.3)
CREAT SERPL-MCNC: 0.52 MG/DL — SIGNIFICANT CHANGE UP (ref 0.5–1.3)
CRP SERPL-MCNC: 6 MG/L — HIGH
CRP SERPL-MCNC: 6 MG/L — HIGH
DIFF PNL FLD: ABNORMAL
DIFF PNL FLD: ABNORMAL
EGFR: 134 ML/MIN/1.73M2 — SIGNIFICANT CHANGE UP
EGFR: 134 ML/MIN/1.73M2 — SIGNIFICANT CHANGE UP
EPI CELLS # UR: PRESENT
EPI CELLS # UR: PRESENT
ERYTHROCYTE [SEDIMENTATION RATE] IN BLOOD: 30 MM/HR — HIGH (ref 0–15)
ERYTHROCYTE [SEDIMENTATION RATE] IN BLOOD: 30 MM/HR — HIGH (ref 0–15)
GGT SERPL-CCNC: 118 U/L — HIGH (ref 8–40)
GGT SERPL-CCNC: 118 U/L — HIGH (ref 8–40)
GLUCOSE SERPL-MCNC: 98 MG/DL — SIGNIFICANT CHANGE UP (ref 70–99)
GLUCOSE SERPL-MCNC: 98 MG/DL — SIGNIFICANT CHANGE UP (ref 70–99)
GLUCOSE UR QL: NEGATIVE MG/DL — SIGNIFICANT CHANGE UP
GLUCOSE UR QL: NEGATIVE MG/DL — SIGNIFICANT CHANGE UP
KETONES UR-MCNC: NEGATIVE MG/DL — SIGNIFICANT CHANGE UP
KETONES UR-MCNC: NEGATIVE MG/DL — SIGNIFICANT CHANGE UP
LEUKOCYTE ESTERASE UR-ACNC: ABNORMAL
LEUKOCYTE ESTERASE UR-ACNC: ABNORMAL
NITRITE UR-MCNC: NEGATIVE — SIGNIFICANT CHANGE UP
NITRITE UR-MCNC: NEGATIVE — SIGNIFICANT CHANGE UP
PH UR: 7.5 — SIGNIFICANT CHANGE UP (ref 5–8)
PH UR: 7.5 — SIGNIFICANT CHANGE UP (ref 5–8)
PHOSPHATE SERPL-MCNC: 3.4 MG/DL — SIGNIFICANT CHANGE UP (ref 2.5–4.5)
PHOSPHATE SERPL-MCNC: 3.4 MG/DL — SIGNIFICANT CHANGE UP (ref 2.5–4.5)
POTASSIUM SERPL-MCNC: 3.7 MMOL/L — SIGNIFICANT CHANGE UP (ref 3.5–5.3)
POTASSIUM SERPL-MCNC: 3.7 MMOL/L — SIGNIFICANT CHANGE UP (ref 3.5–5.3)
POTASSIUM SERPL-SCNC: 3.7 MMOL/L — SIGNIFICANT CHANGE UP (ref 3.5–5.3)
POTASSIUM SERPL-SCNC: 3.7 MMOL/L — SIGNIFICANT CHANGE UP (ref 3.5–5.3)
PROT SERPL-MCNC: 7 G/DL — SIGNIFICANT CHANGE UP (ref 6–8.3)
PROT SERPL-MCNC: 7 G/DL — SIGNIFICANT CHANGE UP (ref 6–8.3)
PROT UR-MCNC: 100 MG/DL
PROT UR-MCNC: 100 MG/DL
RBC CASTS # UR COMP ASSIST: >50 /HPF — HIGH (ref 0–4)
RBC CASTS # UR COMP ASSIST: >50 /HPF — HIGH (ref 0–4)
SODIUM SERPL-SCNC: 142 MMOL/L — SIGNIFICANT CHANGE UP (ref 135–145)
SODIUM SERPL-SCNC: 142 MMOL/L — SIGNIFICANT CHANGE UP (ref 135–145)
SP GR SPEC: 1.04 — HIGH (ref 1–1.03)
SP GR SPEC: 1.04 — HIGH (ref 1–1.03)
TRIGL SERPL-MCNC: 187 MG/DL — HIGH
TRIGL SERPL-MCNC: 187 MG/DL — HIGH
UROBILINOGEN FLD QL: 0.2 MG/DL — SIGNIFICANT CHANGE UP (ref 0.2–1)
UROBILINOGEN FLD QL: 0.2 MG/DL — SIGNIFICANT CHANGE UP (ref 0.2–1)
WBC UR QL: 11 /HPF — HIGH (ref 0–5)
WBC UR QL: 11 /HPF — HIGH (ref 0–5)

## 2023-12-26 PROCEDURE — 43264 ERCP REMOVE DUCT CALCULI: CPT

## 2023-12-26 PROCEDURE — 99222 1ST HOSP IP/OBS MODERATE 55: CPT | Mod: 57,25

## 2023-12-26 PROCEDURE — 43259 EGD US EXAM DUODENUM/JEJUNUM: CPT

## 2023-12-26 PROCEDURE — 43274 ERCP DUCT STENT PLACEMENT: CPT

## 2023-12-26 PROCEDURE — 99232 SBSQ HOSP IP/OBS MODERATE 35: CPT | Mod: GC

## 2023-12-26 PROCEDURE — 88305 TISSUE EXAM BY PATHOLOGIST: CPT | Mod: 26

## 2023-12-26 PROCEDURE — 43239 EGD BIOPSY SINGLE/MULTIPLE: CPT

## 2023-12-26 PROCEDURE — 88312 SPECIAL STAINS GROUP 1: CPT | Mod: 26

## 2023-12-26 DEVICE — HYDRATOME 44: Type: IMPLANTABLE DEVICE | Status: FUNCTIONAL

## 2023-12-26 DEVICE — HYDRA WIRE: Type: IMPLANTABLE DEVICE | Status: FUNCTIONAL

## 2023-12-26 DEVICE — BLLN EXTRCTR PRO RX-S 9-12MM: Type: IMPLANTABLE DEVICE | Status: FUNCTIONAL

## 2023-12-26 DEVICE — STENT BIL ADVANIX RX 10FRX5CM: Type: IMPLANTABLE DEVICE | Status: FUNCTIONAL

## 2023-12-26 RX ORDER — INFLUENZA VIRUS VACCINE 15; 15; 15; 15 UG/.5ML; UG/.5ML; UG/.5ML; UG/.5ML
0.5 SUSPENSION INTRAMUSCULAR ONCE
Refills: 0 | Status: DISCONTINUED | OUTPATIENT
Start: 2023-12-26 | End: 2023-12-27

## 2023-12-26 RX ORDER — ENOXAPARIN SODIUM 100 MG/ML
40 INJECTION SUBCUTANEOUS EVERY 24 HOURS
Refills: 0 | Status: DISCONTINUED | OUTPATIENT
Start: 2023-12-26 | End: 2023-12-27

## 2023-12-26 RX ORDER — ACETAMINOPHEN 500 MG
1000 TABLET ORAL ONCE
Refills: 0 | Status: DISCONTINUED | OUTPATIENT
Start: 2023-12-26 | End: 2023-12-27

## 2023-12-26 RX ORDER — CIPROFLOXACIN LACTATE 400MG/40ML
400 VIAL (ML) INTRAVENOUS ONCE
Refills: 0 | Status: DISCONTINUED | OUTPATIENT
Start: 2023-12-26 | End: 2023-12-26

## 2023-12-26 RX ORDER — METOCLOPRAMIDE HCL 10 MG
10 TABLET ORAL ONCE
Refills: 0 | Status: DISCONTINUED | OUTPATIENT
Start: 2023-12-26 | End: 2023-12-27

## 2023-12-26 RX ORDER — PANTOPRAZOLE SODIUM 20 MG/1
40 TABLET, DELAYED RELEASE ORAL
Refills: 0 | Status: DISCONTINUED | OUTPATIENT
Start: 2023-12-26 | End: 2023-12-27

## 2023-12-26 RX ORDER — INDOMETHACIN 50 MG
100 CAPSULE ORAL ONCE
Refills: 0 | Status: COMPLETED | OUTPATIENT
Start: 2023-12-26 | End: 2023-12-26

## 2023-12-26 RX ADMIN — SODIUM CHLORIDE 200 MILLILITER(S): 9 INJECTION, SOLUTION INTRAVENOUS at 16:38

## 2023-12-26 RX ADMIN — SODIUM CHLORIDE 150 MILLILITER(S): 9 INJECTION, SOLUTION INTRAVENOUS at 01:06

## 2023-12-26 RX ADMIN — Medication 100 MILLIGRAM(S): at 16:38

## 2023-12-26 NOTE — DISCHARGE NOTE PROVIDER - ATTENDING DISCHARGE PHYSICAL EXAMINATION:
Constitutional/General: Well developed, vitals reviewed  EYE: Symmetrical pupils, conjunctiva clear   ENT: Good dentition, oropharynx clear  NECK: No visual masses, no JVD  CHEST: No respiratory distress, bilateral symmetrical chest rise  ABDOMEN: Nondistended, soft, no visual masses  SKIN: No rash, warm, dry  NEURO: A+Ox3, Cranial nerves grossly intact, moves all extremities, follows commands  PSYCH: Normal mood, normal affect

## 2023-12-26 NOTE — PROGRESS NOTE ADULT - PROBLEM SELECTOR PLAN 2
No Hx of alcohol abuse.   CT ab/pelvis noted above  Likely secondary to cholelithiasis and acute pancreatitis.   Now downtrending  No WBC count   Patient having subjective fever and chills  Pending gamma glutamyl transferase, ESR, and CRP  May have acute cholangitis as well  F/U gamma glutamyl transferase, ESR, and CRP No Hx of alcohol abuse.   CT ab/pelvis noted above  Likely secondary to cholelithiasis and acute pancreatitis.   Now downtrending  No WBC count   Patient having subjective fever and chills  Pending gamma glutamyl transferase  ESR, and CRP elevated  May have acute cholangitis as well

## 2023-12-26 NOTE — PROGRESS NOTE ADULT - ATTENDING COMMENTS
Patient was seen and examined at bedside. Reports abd pain improving . denies n/v. Reports being hungry   used [id number]    ICU Vital Signs Last 24 Hrs  T(C): 37.2 (26 Dec 2023 13:19), Max: 37.2 (26 Dec 2023 13:19)  T(F): 99 (26 Dec 2023 13:19), Max: 99 (26 Dec 2023 13:19)  HR: 51 (26 Dec 2023 13:19) (51 - 99)  BP: 134/80 (26 Dec 2023 13:19) (124/75 - 141/87)  BP(mean): --  ABP: --  ABP(mean): --  RR: 16 (26 Dec 2023 13:19) (16 - 18)  SpO2: 99% (26 Dec 2023 13:19) (97% - 99%)    O2 Parameters below as of 26 Dec 2023 13:19  Patient On (Oxygen Delivery Method): room air      P/E:  NAD  AAOx3, no focal deficit   CTABL  S1S2 WNL  Abd soft, tender in RUQ, epigastric and LUQ, BS present,  incision healing   BLLE no edema or calf tenderness     Labs noted     A/P:  EUS with possible ERCP today, NPO, IVF, pain well managed, GI on board  Unsure if patient is nursing the . Avoid nursing while in hospital. DC cipro after procedure if okay with GI  Monitor electrolytes closely  Daily LFTs  Plan of care was discussed with patient; all questions and concerns were addressed and care was aligned with patient's wishes. Patient was seen and examined at bedside. Reports abd pain improving . denies n/v. Reports being hungry   used [id number]    ICU Vital Signs Last 24 Hrs  T(C): 37.2 (26 Dec 2023 13:19), Max: 37.2 (26 Dec 2023 13:19)  T(F): 99 (26 Dec 2023 13:19), Max: 99 (26 Dec 2023 13:19)  HR: 51 (26 Dec 2023 13:19) (51 - 99)  BP: 134/80 (26 Dec 2023 13:19) (124/75 - 141/87)  BP(mean): --  ABP: --  ABP(mean): --  RR: 16 (26 Dec 2023 13:19) (16 - 18)  SpO2: 99% (26 Dec 2023 13:19) (97% - 99%)    O2 Parameters below as of 26 Dec 2023 13:19  Patient On (Oxygen Delivery Method): room air      P/E:  NAD  AAOx3, no focal deficit   CTABL  S1S2 WNL  Abd soft, tender in RUQ, epigastric and LUQ, BS present,  incision healing   BLLE no edema or calf tenderness     Labs noted     A/P:  EUS with possible ERCP today, NPO, IVF, pain well managed, GI on board  Unsure if patient is nursing the . Avoid nursing while in hospital. DC cipro after procedure if okay with GI  Monitor electrolytes closely  Daily LFTs  Plan of care was discussed with patient; all questions and concerns were addressed and care was aligned with patient's wishes.

## 2023-12-26 NOTE — CONSULT NOTE ADULT - SUBJECTIVE AND OBJECTIVE BOX
INITIAL GI CONSULTATION    Patient is a 23y old  Female who presents with a chief complaint of Acute Pancreatitis (25 Dec 2023 23:34)    GI HPI:  Patient is a 23F with a PMHx of known cholelithiasis, hepatic steatosis, gallstone pancreatitis, recent  delivery () who presented to the ED with epigastric pain. GI was consulted for CBD dilatation.    Patient reports intense sharp mid-abd pain that wraps around her abdomen with radiation to the back, with intermittent back pain that radiates to the front, has not trialed meds at home for relief. She states she has never had pain like this before her pregnancy however in the last 2-3 months of pregnancy, she has had ED admissions and visits for gallstone pancreatitis (-, 12/3-). She endorses intermittent n/v with pain, otherwise denies cp, sob, hematemesis, diarrhea, changes in stool caliber, decreased appetite. No family hx of liver disease or colorectal cancers. No smoking/etoh/drug use. No prior EGD.    In the ED, labs notable for hcg 35, lipase >5000, WBC 10.01, Hgb 12.8, TBili 0.4, alk phos 329, AST 86, . CTAP w/ IV - prominent central intrahepatic biliary ducts, CBD 8mm, cholelithiasis, acute pancreatitis. Repeat labs show , ESR 30, TBili 0.3, alk phos 292, AST 60, ALT 91.   s/p 1L NS bolus.      #[id number] Esdras vásquez.       PMH/PSH:  PAST MEDICAL & SURGICAL HISTORY:  Gallstones      Anemia      Pancreatitis      S/P  section            FH:  FAMILY HISTORY:  No pertinent family history          MEDS:  MEDICATIONS  (STANDING):  enoxaparin Injectable 40 milliGRAM(s) SubCutaneous every 24 hours  influenza   Vaccine 0.5 milliLiter(s) IntraMuscular once  lactated ringers. 1000 milliLiter(s) (200 mL/Hr) IV Continuous <Continuous>  naloxone Injectable 0.4 milliGRAM(s) IV Push once    MEDICATIONS  (PRN):  morphine  - Injectable 2 milliGRAM(s) IV Push every 4 hours PRN Moderate Pain (4 - 6)  morphine  - Injectable 4 milliGRAM(s) IV Push every 4 hours PRN Severe Pain (7 - 10)  ondansetron Injectable 4 milliGRAM(s) IV Push every 8 hours PRN Nausea and/or Vomiting    Allergies    penicillins (Hives)    Intolerances          ROS: A detailed set of ROS were asked and negative except those outlined in GI HPI.  ______________________________________________________________________  PHYSICAL EXAM:  T(C): 37 (23 @ 04:50), Max: 37.1 (23 @ 00:07)  HR: 99 (23 @ 04:50)  BP: 124/75 (23 @ 04:50)  RR: 18 (23 @ 04:50)  SpO2: 97% (23 @ 04:50)  Wt(kg): --      GEN: NAD  HEENT: EOMI, conjunctivae anicteric, neck supple, dry mucous membranes  PULM: LSCTAB, no wheezing, rales, or rhonchi  CV: RRR, no m/r/b  GI: Soft, tender to lower abd (pt attributed to surgical incision) and RUQ, ND; +BS in all four quadrants  MSK: GORE, no edema  NEURO: A&O x 3, no gross deficits  ______________________________________________________________________  LABS:                        12.8   10.01 )-----------( 347      ( 25 Dec 2023 20:00 )             37.4         142  |  112<H>  |  10  ----------------------------<  98  3.7   |  24  |  0.52    Ca    8.5      26 Dec 2023 00:20  Phos  3.4         TPro  7.0  /  Alb  2.8<L>  /  TBili  0.3  /  DBili  x   /  AST  60<H>  /  ALT  91<H>  /  AlkPhos  292<H>  12-    LIVER FUNCTIONS - ( 26 Dec 2023 00:20 )  Alb: 2.8 g/dL / Pro: 7.0 g/dL / ALK PHOS: 292 U/L / ALT: 91 U/L DA / AST: 60 U/L / GGT: x           PT/INR - ( 25 Dec 2023 20:00 )   PT: 10.8 sec;   INR: 0.95 ratio           ____________________________________________    IMAGING:    < from: CT Abdomen and Pelvis w/ IV Cont (23 @ 21:33) >   CT ABDOMEN AND PELVIS IC   ORDERED BY:  NAYAN CARDOZO     PROCEDURE DATE:  2023          INTERPRETATION:  CLINICAL INDICATION: upper abdominal pain, hx of   gallstones, s/p     PROCEDURE:  Helical axial images were obtained from the domes of the diaphragm   through the pubic symphysis following the administration of intravenous   contrast. Coronal and sagittal reformats were also obtained.    CONTRAST/COMPLICATIONS:  IV Contrast: Omnipaque 350  90 cc administered   10 cc discarded  Oral Contrast: NONE  Complications: None reported at time of study completion    COMPARISON: 12/15/2023.    FINDINGS:    LOWER CHEST: Atelectasis.    LIVER: Unremarkable.  BILE DUCTS/GALLBLADDER: Prominent central intrahepatic biliary ducts.   Common bile duct measuring up to 0.8 cm. Cholelithiasis.  PANCREAS: Mildly enlarged pancreas with peripancreatic stranding/free   fluid. Hypodense area at the pancreatic body (2:40), which corresponds to   fat attenuation on thecoronal image.  SPLEEN: Unremarkable.    ADRENALS: Unremarkable.  KIDNEYS/URETERS: Prominent bilateral renal collecting system without   hydronephrosis. Question striated bilateral nephrogram.  BLADDER: Partially distended.  REPRODUCTIVE ORGANS: Enlarged, heterogeneous uterus with contour   deformity, reflecting recent postsurgical/postpartum changes. Nonspecific   complex fluid and foci of air at the endometrial cavity. Small fluid at   the vaginal canal.    BOWEL: No bowel obstruction. Unremarkable appendix.  PERITONEUM: No organized fluid collection or free air.  VESSELS: Normal caliber of the abdominal aorta.  RETROPERITONEUM/LYMPH NODE: No lymphadenopathy.  ABDOMINAL WALL/SOFT TISSUES: Small fat-containing umbilical hernia.   Stranding and foci of air along the anterior pelvic wall soft tissue,   reflecting postsurgical changes.  BONES: Degenerative changes of the spine.    IMPRESSION:    Cholelithiasis. Acute pancreatitis. Small peripancreatic free fluid. No   organized peripancreatic fluid collection.    Prominent central intrahepatic biliary ducts. Common bile ductal   dilatation. Recommend clinical correlation and additional imaging   (MRCP/MR) for evaluation of biliary pathology (choledocholithiasis).    Prominent bilateral renal collecting system without hydronephrosis.   Question striated bilateral nephrogram. Recommend clinical correlation to   assess urinary tract infection (cystitis/pyelonephritis).    Nonspecific complex fluid and foci of air at the endometrial cavity,   reflecting recent postsurgical changes. Recommend clinical correlation to   assess infection/inflammation (endometritis).    Additional findings as described.    < end of copied text >   INITIAL GI CONSULTATION    Patient is a 23y old  Female who presents with a chief complaint of Acute Pancreatitis (25 Dec 2023 23:34)    GI HPI:  Patient is a 23F with a PMHx of known cholelithiasis, hepatic steatosis, gallstone pancreatitis, recent  delivery () who presented to the ED with epigastric pain. GI was consulted for CBD dilatation.    Patient reports intense sharp mid-abd pain that wraps around her abdomen with radiation to the back, with intermittent back pain that radiates to the front, has not trialed meds at home for relief. She states she has never had pain like this before her pregnancy however in the last 2-3 months of pregnancy, she has had ED admissions and visits for gallstone pancreatitis (-, 12/3-). She endorses intermittent n/v with pain, otherwise denies cp, sob, hematemesis, diarrhea, changes in stool caliber, decreased appetite. No family hx of liver disease or colorectal cancers. No smoking/etoh/drug use. No prior EGD.    In the ED, labs notable for hcg 35, lipase >5000, WBC 10.01, Hgb 12.8, TBili 0.4, alk phos 329, AST 86, . CTAP w/ IV - prominent central intrahepatic biliary ducts, CBD 8mm, cholelithiasis, acute pancreatitis. Repeat labs show , ESR 30, TBili 0.3, alk phos 292, AST 60, ALT 91.   s/p 1L NS bolus.      #[id number] Esdras vásquez.       PMH/PSH:  PAST MEDICAL & SURGICAL HISTORY:  Gallstones      Anemia      Pancreatitis      S/P  section            FH:  FAMILY HISTORY:  No pertinent family history          MEDS:  MEDICATIONS  (STANDING):  enoxaparin Injectable 40 milliGRAM(s) SubCutaneous every 24 hours  influenza   Vaccine 0.5 milliLiter(s) IntraMuscular once  lactated ringers. 1000 milliLiter(s) (200 mL/Hr) IV Continuous <Continuous>  naloxone Injectable 0.4 milliGRAM(s) IV Push once    MEDICATIONS  (PRN):  morphine  - Injectable 2 milliGRAM(s) IV Push every 4 hours PRN Moderate Pain (4 - 6)  morphine  - Injectable 4 milliGRAM(s) IV Push every 4 hours PRN Severe Pain (7 - 10)  ondansetron Injectable 4 milliGRAM(s) IV Push every 8 hours PRN Nausea and/or Vomiting    Allergies    penicillins (Hives)    Intolerances          ROS: A detailed set of ROS were asked and negative except those outlined in GI HPI.  ______________________________________________________________________  PHYSICAL EXAM:  T(C): 37 (23 @ 04:50), Max: 37.1 (23 @ 00:07)  HR: 99 (23 @ 04:50)  BP: 124/75 (23 @ 04:50)  RR: 18 (23 @ 04:50)  SpO2: 97% (23 @ 04:50)  Wt(kg): --      GEN: NAD  HEENT: EOMI, conjunctivae anicteric, neck supple, dry mucous membranes  PULM: LSCTAB, no wheezing, rales, or rhonchi  CV: RRR, no m/r/b  GI: Soft, tender to lower abd (pt attributed to surgical incision) and RUQ, ND; +BS in all four quadrants  MSK: GORE, no edema  NEURO: A&O x 3, no gross deficits  ______________________________________________________________________  LABS:                        12.8   10.01 )-----------( 347      ( 25 Dec 2023 20:00 )             37.4         142  |  112<H>  |  10  ----------------------------<  98  3.7   |  24  |  0.52    Ca    8.5      26 Dec 2023 00:20  Phos  3.4         TPro  7.0  /  Alb  2.8<L>  /  TBili  0.3  /  DBili  x   /  AST  60<H>  /  ALT  91<H>  /  AlkPhos  292<H>  12-    LIVER FUNCTIONS - ( 26 Dec 2023 00:20 )  Alb: 2.8 g/dL / Pro: 7.0 g/dL / ALK PHOS: 292 U/L / ALT: 91 U/L DA / AST: 60 U/L / GGT: x           PT/INR - ( 25 Dec 2023 20:00 )   PT: 10.8 sec;   INR: 0.95 ratio           ____________________________________________    IMAGING:    < from: CT Abdomen and Pelvis w/ IV Cont (23 @ 21:33) >   CT ABDOMEN AND PELVIS IC   ORDERED BY:  NAYAN CARDOZO     PROCEDURE DATE:  2023          INTERPRETATION:  CLINICAL INDICATION: upper abdominal pain, hx of   gallstones, s/p     PROCEDURE:  Helical axial images were obtained from the domes of the diaphragm   through the pubic symphysis following the administration of intravenous   contrast. Coronal and sagittal reformats were also obtained.    CONTRAST/COMPLICATIONS:  IV Contrast: Omnipaque 350  90 cc administered   10 cc discarded  Oral Contrast: NONE  Complications: None reported at time of study completion    COMPARISON: 12/15/2023.    FINDINGS:    LOWER CHEST: Atelectasis.    LIVER: Unremarkable.  BILE DUCTS/GALLBLADDER: Prominent central intrahepatic biliary ducts.   Common bile duct measuring up to 0.8 cm. Cholelithiasis.  PANCREAS: Mildly enlarged pancreas with peripancreatic stranding/free   fluid. Hypodense area at the pancreatic body (2:40), which corresponds to   fat attenuation on thecoronal image.  SPLEEN: Unremarkable.    ADRENALS: Unremarkable.  KIDNEYS/URETERS: Prominent bilateral renal collecting system without   hydronephrosis. Question striated bilateral nephrogram.  BLADDER: Partially distended.  REPRODUCTIVE ORGANS: Enlarged, heterogeneous uterus with contour   deformity, reflecting recent postsurgical/postpartum changes. Nonspecific   complex fluid and foci of air at the endometrial cavity. Small fluid at   the vaginal canal.    BOWEL: No bowel obstruction. Unremarkable appendix.  PERITONEUM: No organized fluid collection or free air.  VESSELS: Normal caliber of the abdominal aorta.  RETROPERITONEUM/LYMPH NODE: No lymphadenopathy.  ABDOMINAL WALL/SOFT TISSUES: Small fat-containing umbilical hernia.   Stranding and foci of air along the anterior pelvic wall soft tissue,   reflecting postsurgical changes.  BONES: Degenerative changes of the spine.    IMPRESSION:    Cholelithiasis. Acute pancreatitis. Small peripancreatic free fluid. No   organized peripancreatic fluid collection.    Prominent central intrahepatic biliary ducts. Common bile ductal   dilatation. Recommend clinical correlation and additional imaging   (MRCP/MR) for evaluation of biliary pathology (choledocholithiasis).    Prominent bilateral renal collecting system without hydronephrosis.   Question striated bilateral nephrogram. Recommend clinical correlation to   assess urinary tract infection (cystitis/pyelonephritis).    Nonspecific complex fluid and foci of air at the endometrial cavity,   reflecting recent postsurgical changes. Recommend clinical correlation to   assess infection/inflammation (endometritis).    Additional findings as described.    < end of copied text >

## 2023-12-26 NOTE — PROGRESS NOTE ADULT - PROBLEM SELECTOR PLAN 1
CT ab/pelvis showed Cholelithiasis. Acute pancreatitis. Small peripancreatic free fluid. No organized peripancreatic fluid collection. Prominent central intrahepatic biliary ducts. Common bile ductal dilatation.  Likely gallstone pancreatitis. No alcohol intake.   F/U Triglycerides levels  NPO for now  ***MORNING TEAM TO CONSULT GI FOR ERCP***  Started on Tylenol 650 mg PO q6hr PRN  Started on Morphine 2mg IV q4hr PRN for moderate pain  Started on Morphine 4mg IV q4hr PRN for severe pain   Started on Zofran 4 mg IV q8hr PRN   Started on LR at 100 cc for 24 hours CT ab/pelvis - Cholelithiasis. Acute pancreatitis. Small peripancreatic free fluid. No organized peripancreatic fluid collection. Prominent central intrahepatic biliary ducts. Common bile ductal dilatation.  Likely gallstone pancreatitis. No alcohol intake.   Triglycerides 187  NPO for now  GI consulted - ERCP  c/w Tylenol 650 mg PO q6hr PRN  c/w Morphine 2mg IV q4hr PRN for moderate pain  c/w Morphine 4mg IV q4hr PRN for severe pain   c/w Zofran 4 mg IV q8hr PRN   c/w LR at 100 cc for 24 hours  start ciprofloxacin - cover for ascending cholangitis, avoid breastfeeding  Trend BUN, hematocrit, repeat CBC, CMP CT ab/pelvis - Cholelithiasis. Acute pancreatitis. Small peripancreatic free fluid. No organized peripancreatic fluid collection. Prominent central intrahepatic biliary ducts. Common bile ductal dilatation.  Likely gallstone pancreatitis. No alcohol intake.   Triglycerides 187, Lipase>5000  NPO for now  12/26 EUS +/- ERCP   c/w Tylenol 650 mg PO q6hr, Morphine 2mg IV q4hr PRN for moderate, Morphine 4mg IV q4hr PRN for severe   c/w Zofran 4 mg IV q8hr PRN   c/w LR at 100 cc for 24 hours  started ciprofloxacin - cover for ascending cholangitis, avoid breastfeeding  Trend BUN, hematocrit, repeat CBC, CMP

## 2023-12-26 NOTE — PATIENT PROFILE ADULT - NSPROPTRIGHTCAREGIVER_GEN_A_NUR
Care Coordination: Received a call from Manoj Castaneda at [facility]. 56.   7   Ext 56.  02 has been approved and will be delivered within 2 hours.  Pt was updated on approval.    Tahmina Vila no

## 2023-12-26 NOTE — CONSULT NOTE ADULT - NS ATTEND AMEND GEN_ALL_CORE FT
Patient with recurrent acute pancreatitis gallstone related, now 5 days post partum. LFTs mildly elevated, dilated bile duct CT. Will plan for EUS +/- ERCP today to evaluate/treat suspected choledocholithiasis.    Total time spent to complete patient's bedside assessment, physical examination, review medical chart including labs & imaging, discuss medical plan of care with housestaff was more than 50 minutes.

## 2023-12-26 NOTE — CONSULT NOTE ADULT - ASSESSMENT
Patient is a 23F with a PMHx of known cholelithiasis, hepatic steatosis, gallstone pancreatitis, recent  delivery () who presented to the ED with epigastric pain. GI was consulted for CBD dilatation.    #Abdominal pain  #Elevated transaminases  #Elevated alk phos  #Cholelithiasis  #Acute pancreatitis  #Hepatic steatosis  Patient presented with abdominal pain, found to have acute pancreatitis likely gallstone induced. Labs on admission notable for lipase >5000, TBili 0.4, alk phos 329, AST 86, . Repeat labs , ESR 30, Tbili 0.3, alk phos 292, AST 60, ALT 91. Low suspicion for cholangitis at this time given afebrile and TBili <4. Imaging notable for CBD dilatation 8mm and cholelithiasis. Elevated alk phos may be iso recent delivery/postpartum period vs liver disease vs other. R factor 0.6 indicating cholestatic injury. Intermediate risk for choledocholithiasis at this time, would consider obtaining MRI/MRCP w/ IV contrast to r/o CBD stone iso acute pancreatitis. Reassuringly, BISAP 0 and VSS.     	- Recommend MRI/MRCP w/ IV contrast for better visualization of biliary tree  	- Trend hepatic function panel daily  	- Agree with moderate IVF resuscitation for acute pancreatitis: LR bolus ~ 10 cc/kg followed by LR infusion of 1.5cc/kg/hr x 24 hrs  	- Monitor I/O  	- Pain control  	- Serial abdominal exam  	- Surgical eval for cholecystectomy    This note and its recommendations herein are preliminary until such time as cosigned by an attending.    GI will continue to follow.  Thank you for this consult! Patient is a 23F with a PMHx of known cholelithiasis, hepatic steatosis, gallstone pancreatitis, recent  delivery () who presented to the ED with epigastric pain. GI was consulted for CBD dilatation.    #Abdominal pain  #Elevated transaminases  #Elevated alk phos  #Cholelithiasis  #Acute pancreatitis  #Hepatic steatosis  Patient presented with abdominal pain, found to have acute pancreatitis likely gallstone induced. Labs on admission notable for lipase >5000, TBili 0.4, alk phos 329, AST 86, . Repeat labs , ESR 30, Tbili 0.3, alk phos 292, AST 60, ALT 91. Low suspicion for cholangitis at this time given afebrile and TBili <4. Imaging notable for CBD dilatation 8mm and cholelithiasis. Elevated alk phos may be iso recent delivery/postpartum period vs liver disease vs other. R factor 0.6 indicating cholestatic injury. Intermediate risk for choledocholithiasis at this time, would consider obtaining MRI/MRCP w/ IV contrast to r/o CBD stone iso acute pancreatitis. Reassuringly, BISAP 0 and VSS.     	- Tentative EUS +/- ERCP today , primary team aware  	- NPO  	- Trend hepatic function panel daily  	- Agree with moderate IVF resuscitation for acute pancreatitis: LR bolus ~ 10 cc/kg followed by LR infusion of 1.5cc/kg/hr x 24 hrs  	- Monitor I/O  	- Pain control  	- Serial abdominal exam  	- Surgical eval for cholecystectomy    This note and its recommendations herein are preliminary until such time as cosigned by an attending.    GI will continue to follow.  Thank you for this consult!

## 2023-12-26 NOTE — DISCHARGE NOTE PROVIDER - NSDCCPCAREPLAN_GEN_ALL_CORE_FT
PRINCIPAL DISCHARGE DIAGNOSIS  Diagnosis: Acute pancreatitis  Assessment and Plan of Treatment: You presented to the hospital with severe epigastric pain. You were diagnosed with Pancreatitis which is an inflammation of the pancreas which causeas, nausea, vomiting, upper abdominal pain radiating to the back, abdominal tenderness, fever. Your CT abdomen showed signs of pancreatitis. Your pancreatitis was secondary to gallstones. You were seen by a gastroenterologist and their recommendations were followed. You were treated with IV fluids and pain medication, which signifcantly improved your symptoms. You underwent ERCP which showed sludges in the duct that drains bile from your gall bladder. A plastic stent was placed to aid in bettter drainage. Please follow up with the gastroenterologist in 2-3 months to remove the plastic stent.      SECONDARY DISCHARGE DIAGNOSES  Diagnosis: Cholelithiases  Assessment and Plan of Treatment: You presented to the hospital with severe epigastric pain. CT scan of your abdomen showed stones in your gall bladder. Surgery was consulted.    Diagnosis: Gastric ulcer  Assessment and Plan of Treatment: You presented to the hospital with epigastric pain. EGD (endoscopy of your esophagus, stomach and duodenum) found a 5 mm ulcer and erosions throughout the stomach. Biopsy of completed. PLEASE CONTINUE TO TAKE OMEPRAZOLE 40MG TWICE A DAY UNTIL XXX. PLEASE AVOID TAKING NSAIDs (aspirin, ibuprofen, motrin, ketrolac, naproxen) FOR AT LEAST 4 WEEKS.    Diagnosis: Gallstones  Assessment and Plan of Treatment:      PRINCIPAL DISCHARGE DIAGNOSIS  Diagnosis: Acute pancreatitis  Assessment and Plan of Treatment: You presented to the hospital with severe epigastric pain. You were diagnosed with Pancreatitis which is an inflammation of the pancreas which causeas, nausea, vomiting, upper abdominal pain radiating to the back, abdominal tenderness, fever. Your CT abdomen showed signs of pancreatitis. Your pancreatitis was secondary to gallstones. You were seen by a gastroenterologist and their recommendations were followed. You were treated with IV fluids and pain medication, which signifcantly improved your symptoms. You underwent ERCP which showed sludges in the duct that drains bile from your gall bladder. A plastic stent was placed to aid in bettter drainage. Please follow up with the gastroenterologist in 2-3 months to remove the plastic stent.      SECONDARY DISCHARGE DIAGNOSES  Diagnosis: Gastric ulcer  Assessment and Plan of Treatment: You presented to the hospital with epigastric pain. EGD (endoscopy of your esophagus, stomach and duodenum) found a 5 mm ulcer and erosions throughout the stomach. Biopsy of completed. PLEASE CONTINUE TO TAKE OMEPRAZOLE 40MG TWICE A DAY . PLEASE AVOID TAKING NSAIDs (aspirin, ibuprofen, motrin, ketrolac, naproxen) FOR AT LEAST 4 WEEKS.    Diagnosis: Cholelithiases  Assessment and Plan of Treatment: You presented to the hospital with severe epigastric pain. CT scan of your abdomen showed stones in your gall bladder. Surgery was consulted. They Recommend cholecystectomy to prevent recurrence in 1 month . Please follow-up with surgeon Dr. Fraga outpatient. Her information can be found below.

## 2023-12-26 NOTE — DISCHARGE NOTE PROVIDER - HOSPITAL COURSE
A 23 year old female, from home, w/ PMHx of Gallstone pancreatitis, presented to the ED complaining of rapidly progressing epigastric pain radiating to her back, CT A/P showed showed Cholelithiasis. Acute pancreatitis. Small peripancreatic free fluid. No organized peripancreatic fluid collection. Prominent central intrahepatic biliary ducts. Common bile ductal dilatation. Lipase > 5000. Admitted to medicine for acute gallstone pancreatitis management. Patient treated with moderated IVF resuscitation and pain medication. GI was consulted. EUS/ERCP showed gastric ulcer, sludge, no stone. Plastic stent was placed.    Patient is able to ambulate and tolerate diet prior to discharge. Patient is stable for discharge per attending and is advised to follow up with PCP as outpatient.   Please refer to patient's complete medical chart with documents for a full hospital course, for this is only a brief summary.   A 23 year old female, from home, w/ PMHx of Gallstone pancreatitis, presented to the ED complaining of rapidly progressing epigastric pain radiating to her back, CT A/P showed showed Cholelithiasis. Acute pancreatitis. Small peripancreatic free fluid. No organized peripancreatic fluid collection. Prominent central intrahepatic biliary ducts. Common bile ductal dilatation. Lipase > 5000. Admitted to medicine for acute gallstone pancreatitis management. Patient treated with moderated IVF resuscitation and pain medication. GI was consulted. EUS/ERCP showed gastric ulcer, sludge, no stone. Plastic stent was placed. Surgery was consulted, and recommend cholecystectomy to prevent recurrence in 1 month due to recent     Patient is able to ambulate and tolerate diet prior to discharge. Patient is stable for discharge per attending and is advised to follow up with PCP as outpatient.   Please refer to patient's complete medical chart with documents for a full hospital course, for this is only a brief summary.

## 2023-12-26 NOTE — DISCHARGE NOTE PROVIDER - CARE PROVIDER_API CALL
Kumar Vieira  Gastroenterology  [address] [facility], [address] 2 [place] [address]  Phone: [phone]  Fax: [phone]  Established Patient  Follow Up Time:    Kumar Vieira  Gastroenterology  [address] [facility], [address] 2 [place] [address]  Phone: [phone]  Fax: [phone]  Established Patient  Follow Up Time:    Kumar Vieira  Gastroenterology  [address] [facility], [address] 2 [place] [address]  Phone: [phone]  Fax: [phone]  Established Patient  Follow Up Time:     Namrata Fraga  Surgery  [address] [facility], [address]  [place] [address]  Phone: [phone]  Fax: [phone]  Follow Up Time:    Kumar Vieira  Gastroenterology  [address] [facility], [address] [place] [address]  Phone: [phone]  Fax: [phone]  Established Patient  Follow Up Time:     Namrata Fraga  Surgery  [address] [facility], [address]  [place] [address]  Phone: [phone]  Fax: [phone]  Follow Up Time:

## 2023-12-26 NOTE — PROGRESS NOTE ADULT - ASSESSMENT
A 23 year old female, from home, w/ PMHx of Gallstone pancreatitis, presented to the ED complaining of rapidly progressing epigastric pain radiating to her back, 10/10 in intensity, sharp in quality, constant, and exacerbated w/ changes in body position or palpation. This is associated w/ nausea, vomiting, chills, fever, and anorexia. Admitted to medicine for acute pancreatitis management.  A 23 year old female, from home, w/ PMHx of Gallstone pancreatitis, presented to the ED complaining of rapidly progressing epigastric pain radiating to her back, Lipase >5000, CT scan showing cholelithiasis, CBD dilatation and pancreatitis.. Admitted to medicine for acute gallstone pancreatitis management.

## 2023-12-26 NOTE — PATIENT PROFILE ADULT - FALL HARM RISK - UNIVERSAL INTERVENTIONS
Bed in lowest position, wheels locked, appropriate side rails in place/Call bell, personal items and telephone in reach/Instruct patient to call for assistance before getting out of bed or chair/Non-slip footwear when patient is out of bed/[place] to call system/Physically safe environment - no spills, clutter or unnecessary equipment/Purposeful Proactive Rounding/Room/bathroom lighting operational, light cord in reach Bed in lowest position, wheels locked, appropriate side rails in place/Call bell, personal items and telephone in reach/Instruct patient to call for assistance before getting out of bed or chair/Non-slip footwear when patient is out of bed/[place] to call system/Physically safe environment - no spills, clutter or unnecessary equipment/Purposeful Proactive Rounding/Room/bathroom lighting operational, light cord in reach

## 2023-12-26 NOTE — DISCHARGE NOTE PROVIDER - PROVIDER TOKENS
PROVIDER:[TOKEN:[[id number]],ESTABLISHEDPATIENT:[T]] PROVIDER:[TOKEN:[[id number]],ESTABLISHEDPATIENT:[T]] PROVIDER:[TOKEN:[[id number]],ESTABLISHEDPATIENT:[T]],PROVIDER:[TOKEN:[[id number]]] PROVIDER:[TOKEN:[[id number]],ESTABLISHEDPATIENT:[T]],PROVIDER:[TOKEN:[[id number]]]

## 2023-12-26 NOTE — PATIENT PROFILE ADULT - NSPRESCRALCFREQ_GEN_A_NUR
Hemorrhoids    Hemorrhoids are swollen and inflamed veins inside the rectum and near the anus. The rectum is the last several inches of the colon. The anus is the passage between the rectum and the outside of the body.  Causes  The veins can become swollen due to increased pressure in them. This is most often caused by:  · Chronic constipation or diarrhea  · Straining when having a bowel movement  · Sitting too long on the toilet  · A low-fiber diet  · Pregnancy  Symptoms  · Bleeding from the rectum (this may be noticeable after bowel movements)  · Lump near the anus  · Itching around the anus  · Pain around the anus  There are different types of hemorrhoids. Depending on the type you have and the severity, you may be able to treat yourself at home. In some cases, a procedure may be the best treatment option. Your healthcare provider can tell you more about this, if needed.  Home care  General care  · To get relief from pain or itching, try:  ¨ Topical products. Your healthcare provider may prescribe or recommend creams, ointments, or pads that can be applied to the hemorrhoid. Use these exactly as directed.  ¨ Medicines. Your healthcare provider may recommend stool softeners, suppositories, or laxatives to help manage constipation. Use these exactly as directed.  ¨ Sitz baths. A sitz bath involves sitting in a few inches of warm bath water. Be careful not to make the water so hot that you burn yourself--test it before sitting in it. Soak for about 10 to 15 minutes a few times a day. This may help relieve pain.  Tips to help prevent hemorrhoids  · Eat more fiber. Fiber adds bulk to stool and absorbs water as it moves through your colon. This makes stool softer and easier to pass.  ¨ Increase the fiber in your diet with more fiber-rich foods. These include fresh fruit, vegetables, and whole grains.  ¨ Take a fiber supplement or bulking agent, if advised to by your provider. These include products such as psyllium  or methylcellulose.  · Drink plenty of water, if directed to by your provider. This can help keep stool soft.  · Be more active. Frequent exercise aids digestion and helps prevent constipation. It may also help make bowel movements more regular.  · Dont strain during bowel movements. This can make hemorrhoids more likely. Also, dont sit on the toilet for long periods of time.  Follow-up care  Follow up with your healthcare provider, or as advised. If a culture or imaging tests were done, you will be notified of the results when they are ready. This may take a few days or longer.  When to seek medical advice  Call your healthcare provider right away if any of these occur:  · Increased bleeding from the rectum  · Increased pain around the rectum or anus  · Weakness or dizziness  Call 911  Call 911 or return to the emergency department right away if any of these occur:  · Trouble breathing or swallowing  · Fainting or loss of consciousness  · Unusually fast heart rate  · Vomiting blood  · Large amounts of blood in stool  Date Last Reviewed: 6/22/2015 © 2000-2017 [employer]. [address] [place] [address]. All rights reserved. This information is not intended as a substitute for professional medical care. Always follow your healthcare professional's instructions.        Diverticulosis    Diverticulosis means that small pouches have formed in the wall of your large intestine (colon). Most often, this problem causes no symptoms and is common as people age. But the pouches in the colon are at risk of becoming infected. When this happens, the condition is called diverticulitis. Although most people with diverticulosis never develop diverticulitis, it is still not uncommon. Rectal bleeding can also occur and in less common situations, a type of colon inflammation called colitis.  While most people do not have symptoms, some people with diverticulosis may have:  · Abdominal cramps and  pain  · Bloating  · Constipation  · Change in bowel habits  Causes  The exact cause of diverticulosis (and diverticulitis) has not been proved, but a few things are associated with the condition:  · Low-fiber diet  · Constipation  · Lack of exercise  Your healthcare provider will talk with you about how to manage your condition. Diet changes may be all that are needed to help control diverticulosis and prevent progression to diverticulitis. If you develop diverticulitis, you will likely need other treatments.  Home care  You may be told to take fiber supplements daily. Fiber adds bulk to the stool so that it passes through the colon more easily. Stool softeners may be recommended. You may also be given medications for pain relief. Be sure to take all medications as directed.  In the past, people were told to avoid corn, nuts, and seeds. This is no longer necessary.  Follow these guidelines when caring for yourself at home:  · Eat unprocessed foods that are high in fiber. Whole grains, fruits, and vegetables are good choices.  · Drink 6 to 8 glasses of water every day unless your healthcare provider has you limit how much fluid you should have.  · Watch for changes in your bowel movements. Tell your provider if you notice any changes.  · Begin an exercise program. Ask your provider how to get started. Generally, walking is the best.  · Get plenty of rest and sleep.  Follow-up care  Follow up with your healthcare provider, or as advised. Regular visits may be needed to check on your health. Sometimes special procedures such as colonoscopy, are needed after an episode of diverticulitis or blooding. Be sure to keep all your appointments.  If a stool sample was taken, or cultures were done, you should be told if they are positive, or if your treatment needs to be changed. You can call as directed for the results.  If X-rays were done, a radiologist will look at them. You will be told if there is a change in your  treatment.  If antibiotics were prescribed, be sure to finish them all.  When to seek medical advice  Call your healthcare provider right away if any of these occur:  · Fever of 100.4°F (38°C) or higher, or as directed by your healthcare provider  · Severe cramps in the lower left side of the abdomen or pain that is getting worse  · Tenderness in the lower left side of the abdomen or worsening pain throughout the abdomen  · Diarrhea or constipation that doesn't get better within 24 hours  · Nausea and vomiting  · Bleeding from the rectum  Call 911  Call emergency services if any of the following occur:  · Trouble breathing  · Confusion  · Very drowsy or trouble awakening  · Fainting or loss of consciousness  · Rapid heart rate  · Chest pain  Date Last Reviewed: 12/30/2015 © 2000-2017 [employer]. [address] [place] [address], [place] [address]. All rights reserved. This information is not intended as a substitute for professional medical care. Always follow your healthcare professional's instructions.        Understanding Colon and Rectal Polyps    The colon (also called the large intestine) is a muscular tube that forms the last part of the digestive tract. It absorbs water and stores food waste. The colon is about 4 to 6 feet long. The rectum is the last 6 inches of the colon. The colon and rectum have a smooth lining composed of millions of cells. Changes in these cells can lead to growths in the colon that can become cancerous and should be removed. Multiple tests are available to screen for colon cancer, but the colonoscopy is the most recommended test. During colonoscopy, these polyps can be removed. How often you need this test depends on many things including your condition, your family history, symptoms, and what the findings were at the previous colonoscopy.   When the colon lining changes  Changes that happen in the cells that line the colon or rectum can lead to growths called polyps. Over a  period of years, polyps can turn cancerous. Removing polyps early may prevent cancer from ever forming.  Polyps  Polyps are fleshy clumps of tissue that form on the lining of the colon or rectum. Small polyps are usually benign (not cancerous). However, over time, cells in a polyp can change and become cancerous. Certain types of polyps known as adenomatous polyps are premalignant. The risk for invasive cancer increases with the size of the polyp and certain cell and gene features. This means that they can become cancerous if they're not removed. Hyperplastic polyps are benign. They can grow quite large and not turn cancerous.   Cancer  Almost all colorectal cancers start when polyp cells begin growing abnormally. As a cancerous tumor grows, it may involve more and more of the colon or rectum. In time, cancer can also grow beyond the colon or rectum and spread to nearby organs or to glands called lymph nodes. The cells can also travel to other parts of the body. This is known as metastasis. The earlier a cancerous tumor is removed, the better the chance of preventing its spread.    Date Last Reviewed: 8/1/2016  © 7969-2156 [employer]. [address] [place] [address], [place] [address]. All rights reserved. This information is not intended as a substitute for professional medical care. Always follow your healthcare professional's instructions.         Never

## 2023-12-26 NOTE — PROGRESS NOTE ADULT - SUBJECTIVE AND OBJECTIVE BOX
Chief complaint: Patient is a 23y old  Female who presents with a chief complaint of Acute Pancreatitis (26 Dec 2023 09:54)      NATHALY OBRIEN is a 23y year old Female     INTERVAL HPI/OVERNIGHT EVENTS:      REVIEW OF SYSTEMS:  CONSTITUTIONAL: No fever, weight loss, or fatigue  EYES: No eye pain, visual disturbances, or discharge  ENMT:  No difficulty hearing, tinnitus, vertigo; No sinus or throat pain  NECK: No pain or stiffness  RESPIRATORY: No cough, wheezing, chills or hemoptysis; No shortness of breath  CARDIOVASCULAR: No chest pain, palpitations, dizziness, or leg swelling  GASTROINTESTINAL: No abdominal or epigastric pain. No nausea, vomiting, or hematemesis; No diarrhea or constipation. No melena or hematochezia.  GENITOURINARY: No dysuria, frequency, hematuria, or incontinence  NEUROLOGICAL: No headaches, memory loss, loss of strength, numbness, or tremors  SKIN: No itching, burning, rashes, or lesions   ENDOCRINE: No heat or cold intolerance  MUSCULOSKELETAL: No joint pain or swelling; No muscle, back, or extremity pain  PSYCHIATRIC: No depression, anxiety, mood swings, or difficulty sleeping  HEME/LYMPH: No easy bruising, or bleeding gums  ALLERY AND IMMUNOLOGIC: No hives or eczema    FAMILY HISTORY:  No pertinent family history        T(C): 37.2 (12-26-23 @ 13:19), Max: 37.2 (12-26-23 @ 13:19)  HR: 51 (12-26-23 @ 13:19) (51 - 99)  BP: 134/80 (12-26-23 @ 13:19) (124/75 - 141/87)  RR: 16 (12-26-23 @ 13:19) (16 - 18)  SpO2: 99% (12-26-23 @ 13:19) (97% - 99%)  Wt(kg): --Vital Signs Last 24 Hrs  T(C): 37.2 (26 Dec 2023 13:19), Max: 37.2 (26 Dec 2023 13:19)  T(F): 99 (26 Dec 2023 13:19), Max: 99 (26 Dec 2023 13:19)  HR: 51 (26 Dec 2023 13:19) (51 - 99)  BP: 134/80 (26 Dec 2023 13:19) (124/75 - 141/87)  BP(mean): --  RR: 16 (26 Dec 2023 13:19) (16 - 18)  SpO2: 99% (26 Dec 2023 13:19) (97% - 99%)    Parameters below as of 26 Dec 2023 13:19  Patient On (Oxygen Delivery Method): room air        PHYSICAL EXAM:  GENERAL: NAD, well-groomed, well-developed  HEAD:  Atraumatic, Normocephalic  EYES: EOMI, PERRLA, conjunctiva and sclera clear  ENMT: No tonsillar erythema, exudates, or enlargement; Moist mucous membranes.   NECK: Supple, No JVD  NERVOUS SYSTEM:  Alert & Oriented X3, Good concentration; Motor Strength 5/5 B/L upper and lower extremities  CHEST/LUNG: Clear to percussion bilaterally; No rales, rhonchi, wheezing, or rubs  HEART: Regular rate and rhythm; No murmurs, rubs, or gallops  ABDOMEN: Soft, Nontender, Nondistended; Bowel sounds present  EXTREMITIES: No clubbing, cyanosis, or edema  SKIN: No rashes or lesions    Consultant(s) Notes Reviewed:  [x ] YES  [ ] NO  Care Discussed with Consultants/Other Providers [ x] YES  [ ] NO    LABS:                        12.8   10.01 )-----------( 347      ( 25 Dec 2023 20:00 )             37.4       12-26    142  |  112<H>  |  10  ----------------------------<  98  3.7   |  24  |  0.52    Ca    8.5      26 Dec 2023 00:20  Phos  3.4     12-26    TPro  7.0  /  Alb  2.8<L>  /  TBili  0.3  /  DBili  x   /  AST  60<H>  /  ALT  91<H>  /  AlkPhos  292<H>  12-26        RADIOLOGY & ADDITIONAL TESTS:    Imaging Personally Reviewed:  [ ] YES  [ ] NO  ciprofloxacin   IVPB 400 milliGRAM(s) IV Intermittent once  indomethacin Suppository 100 milliGRAM(s) Rectal once  influenza   Vaccine 0.5 milliLiter(s) IntraMuscular once  lactated ringers. 1000 milliLiter(s) IV Continuous <Continuous>  morphine  - Injectable 2 milliGRAM(s) IV Push every 4 hours PRN  morphine  - Injectable 4 milliGRAM(s) IV Push every 4 hours PRN  naloxone Injectable 0.4 milliGRAM(s) IV Push once  ondansetron Injectable 4 milliGRAM(s) IV Push every 8 hours PRN      HEALTH ISSUES - PROBLEM Dx:  Acute pancreatitis    Transaminitis    Prophylactic measure           Chief complaint: Patient is a 23y old  Female who presents with a chief complaint of Acute Pancreatitis (26 Dec 2023 09:54)      NATHALY OBRIEN is a 23y year old Female, with a PMHx of known cholelithiasis, hepatic steatosis, gallstone pancreatitis, recent  delivery at 41wks (23), who presented to the ED with epigastric pain. Patient reports intense sharp mid-abd pain that wraps around her abdomen with radiation to the back, with intermittent back pain that radiates to the front, has not trialed meds at home for relief. She states she has never had pain like this before her pregnancy however in the last 2-3 months of pregnancy, she has had ED admissions and visits for gallstone pancreatitis (-, 12/3-). She endorses intermittent n/v with pain, otherwise denies cp, sob, hematemesis, diarrhea, changes in stool caliber, decreased appetite. No family hx of liver disease or colorectal cancers. No smoking/etoh/drug use. No prior EGD. Admitted for acute pancreatitis.     #[id number] Esdras.     INTERVAL HPI/OVERNIGHT EVENTS: NPO, feels thirsty. Denies pain, nausea, or vomiting. Last BM yesterday - light green color.    REVIEW OF SYSTEMS:  CONSTITUTIONAL: No fever, weight loss, or fatigue  NECK: + pain or stiffness  RESPIRATORY: No cough, wheezing, chills or hemoptysis; No shortness of breath  CARDIOVASCULAR: No chest pain, palpitations, dizziness, or leg swelling  GASTROINTESTINAL: + abdominal or epigastric pain. No nausea, vomiting, or hematemesis; No diarrhea or constipation. No melena or hematochezia.  GENITOURINARY: No dysuria, frequency, hematuria, or incontinence  SKIN: No itching, burning, rashes, or lesions   MUSCULOSKELETAL: No joint pain or swelling; + muscle, back, or extremity pain    FAMILY HISTORY:  No pertinent family history        T(C): 37.2 (23 @ 13:19), Max: 37.2 (23 @ 13:19)  HR: 51 (23 @ 13:19) (51 - 99)  BP: 134/80 (23 @ 13:19) (124/75 - 141/87)  RR: 16 (23 @ 13:19) (16 - 18)  SpO2: 99% (23 @ 13:19) (97% - 99%)  Wt(kg): --Vital Signs Last 24 Hrs  T(C): 37.2 (26 Dec 2023 13:19), Max: 37.2 (26 Dec 2023 13:19)  T(F): 99 (26 Dec 2023 13:19), Max: 99 (26 Dec 2023 13:19)  HR: 51 (26 Dec 2023 13:19) (51 - 99)  BP: 134/80 (26 Dec 2023 13:19) (124/75 - 141/87)  BP(mean): --  RR: 16 (26 Dec 2023 13:19) (16 - 18)  SpO2: 99% (26 Dec 2023 13:19) (97% - 99%)    Parameters below as of 26 Dec 2023 13:19  Patient On (Oxygen Delivery Method): room air        PHYSICAL EXAM:  GENERAL: NAD, well-groomed, well-developed  HEAD:  Atraumatic, Normocephalic  EYES: conjunctiva and sclera clear  ENMT: Moist mucous membranes.   NECK: Supple, no cervical LAD, tenderness to palpation of right paraspinal muscles  NERVOUS SYSTEM:  Alert & Oriented X3, Good concentration;   CHEST/LUNG: Clear to percussion bilaterally; No rales, rhonchi, wheezing, or rubs  HEART: Regular rate and rhythm; No murmurs, rubs, or gallops  ABDOMEN: Soft, tenderness to palpation of lower abdomen, Nondistended; Bowel sounds present; + Garcia's sign  EXTREMITIES: No clubbing, cyanosis, or edema  SKIN: No rashes or lesions, C section scar healing well    Consultant(s) Notes Reviewed:  [x ] YES  [ ] NO  Care Discussed with Consultants/Other Providers [ x] YES  [ ] NO    LABS:                        12.8   10.01 )-----------( 347      ( 25 Dec 2023 20:00 )             37.4           142  |  112<H>  |  10  ----------------------------<  98  3.7   |  24  |  0.52    Ca    8.5      26 Dec 2023 00:20  Phos  3.4         TPro  7.0  /  Alb  2.8<L>  /  TBili  0.3  /  DBili  x   /  AST  60<H>  /  ALT  91<H>  /  AlkPhos  292<H>          RADIOLOGY & ADDITIONAL TESTS: reviewed    Imaging Personally Reviewed:  [ ] YES  [ ] NO  ciprofloxacin   IVPB 400 milliGRAM(s) IV Intermittent once  indomethacin Suppository 100 milliGRAM(s) Rectal once  influenza   Vaccine 0.5 milliLiter(s) IntraMuscular once  lactated ringers. 1000 milliLiter(s) IV Continuous <Continuous>  morphine  - Injectable 2 milliGRAM(s) IV Push every 4 hours PRN  morphine  - Injectable 4 milliGRAM(s) IV Push every 4 hours PRN  naloxone Injectable 0.4 milliGRAM(s) IV Push once  ondansetron Injectable 4 milliGRAM(s) IV Push every 8 hours PRN      HEALTH ISSUES - PROBLEM Dx:  Acute pancreatitis    Transaminitis    Prophylactic measure           Chief complaint: Patient is a 23y old  Female who presents with a chief complaint of Acute Pancreatitis (26 Dec 2023 09:54)      NATHALY OBRIEN is a 23y year old Female, with a PMHx of known cholelithiasis, hepatic steatosis, gallstone pancreatitis, recent  delivery at 41wks (23), who presented to the ED with epigastric pain. Patient reports intense sharp mid-abd pain that wraps around her abdomen with radiation to the back, with intermittent back pain that radiates to the front, has not trialed meds at home for relief. She states she has never had pain like this before her pregnancy however in the last 2-3 months of pregnancy, she has had ED admissions and visits for gallstone pancreatitis (-, 12/3-). She endorses intermittent n/v with pain, otherwise denies cp, sob, hematemesis, diarrhea, changes in stool caliber, decreased appetite. No family hx of liver disease or colorectal cancers. No smoking/etoh/drug use. No prior EGD. Admitted for acute pancreatitis.     #[id number] Esdras.     INTERVAL HPI/OVERNIGHT EVENTS: NPO, feels thirsty. Denies pain, nausea, or vomiting. Last BM yesterday - light green color.    REVIEW OF SYSTEMS:  CONSTITUTIONAL: No fever, weight loss, or fatigue  NECK: + pain or stiffness  RESPIRATORY: No cough, wheezing, chills or hemoptysis; No shortness of breath  CARDIOVASCULAR: No chest pain, palpitations, dizziness, or leg swelling  GASTROINTESTINAL: + abdominal or epigastric pain. No nausea, vomiting, or hematemesis; No diarrhea or constipation. No melena or hematochezia.  GENITOURINARY: No dysuria, frequency, hematuria, or incontinence  SKIN: No itching, burning, rashes, or lesions   MUSCULOSKELETAL: No joint pain or swelling; + muscle, back, or extremity pain    FAMILY HISTORY:  No pertinent family history        T(C): 37.2 (23 @ 13:19), Max: 37.2 (23 @ 13:19)  HR: 51 (23 @ 13:19) (51 - 99)  BP: 134/80 (23 @ 13:19) (124/75 - 141/87)  RR: 16 (23 @ 13:19) (16 - 18)  SpO2: 99% (23 @ 13:19) (97% - 99%)  Wt(kg): --Vital Signs Last 24 Hrs  T(C): 37.2 (26 Dec 2023 13:19), Max: 37.2 (26 Dec 2023 13:19)  T(F): 99 (26 Dec 2023 13:19), Max: 99 (26 Dec 2023 13:19)  HR: 51 (26 Dec 2023 13:19) (51 - 99)  BP: 134/80 (26 Dec 2023 13:19) (124/75 - 141/87)  BP(mean): --  RR: 16 (26 Dec 2023 13:19) (16 - 18)  SpO2: 99% (26 Dec 2023 13:19) (97% - 99%)    Parameters below as of 26 Dec 2023 13:19  Patient On (Oxygen Delivery Method): room air        PHYSICAL EXAM:  GENERAL: NAD, well-groomed, well-developed  HEAD:  Atraumatic, Normocephalic  EYES: conjunctiva and sclera clear  ENMT: Moist mucous membranes.   NECK: Supple, no cervical LAD, tenderness to palpation of right paraspinal muscles  NERVOUS SYSTEM:  Alert & Oriented X3, Good concentration;   CHEST/LUNG: Clear to percussion bilaterally; No rales, rhonchi, wheezing, or rubs  HEART: Regular rate and rhythm; No murmurs, rubs, or gallops  ABDOMEN: Soft, tenderness to palpation of lower abdomen, Nondistended; Bowel sounds present; + Agrcia's sign  EXTREMITIES: No clubbing, cyanosis, or edema  SKIN: No rashes or lesions, C section scar healing well    Consultant(s) Notes Reviewed:  [x ] YES  [ ] NO  Care Discussed with Consultants/Other Providers [ x] YES  [ ] NO    LABS:                        12.8   10.01 )-----------( 347      ( 25 Dec 2023 20:00 )             37.4           142  |  112<H>  |  10  ----------------------------<  98  3.7   |  24  |  0.52    Ca    8.5      26 Dec 2023 00:20  Phos  3.4         TPro  7.0  /  Alb  2.8<L>  /  TBili  0.3  /  DBili  x   /  AST  60<H>  /  ALT  91<H>  /  AlkPhos  292<H>          RADIOLOGY & ADDITIONAL TESTS: reviewed    Imaging Personally Reviewed:  [ ] YES  [ ] NO  ciprofloxacin   IVPB 400 milliGRAM(s) IV Intermittent once  indomethacin Suppository 100 milliGRAM(s) Rectal once  influenza   Vaccine 0.5 milliLiter(s) IntraMuscular once  lactated ringers. 1000 milliLiter(s) IV Continuous <Continuous>  morphine  - Injectable 2 milliGRAM(s) IV Push every 4 hours PRN  morphine  - Injectable 4 milliGRAM(s) IV Push every 4 hours PRN  naloxone Injectable 0.4 milliGRAM(s) IV Push once  ondansetron Injectable 4 milliGRAM(s) IV Push every 8 hours PRN      HEALTH ISSUES - PROBLEM Dx:  Acute pancreatitis    Transaminitis    Prophylactic measure

## 2023-12-26 NOTE — DISCHARGE NOTE PROVIDER - NSDCMRMEDTOKEN_GEN_ALL_CORE_FT
Prenatal Multivitamins oral tablet: 1 tab(s) orally once a day   pantoprazole 40 mg oral delayed release tablet: 1 tab(s) orally 2 times a day  Prenatal Multivitamins oral tablet: 1 tab(s) orally once a day   pantoprazole 40 mg oral delayed release tablet: 1 tab(s) orally 2 times a day  pantoprazole 40 mg oral delayed release tablet: 1 tab(s) orally 2 times a day  Prenatal Multivitamins oral tablet: 1 tab(s) orally once a day

## 2023-12-27 ENCOUNTER — TRANSCRIPTION ENCOUNTER (OUTPATIENT)
Age: 23
End: 2023-12-27

## 2023-12-27 VITALS
RESPIRATION RATE: 17 BRPM | HEART RATE: 54 BPM | TEMPERATURE: 99 F | OXYGEN SATURATION: 98 % | SYSTOLIC BLOOD PRESSURE: 124 MMHG | DIASTOLIC BLOOD PRESSURE: 75 MMHG

## 2023-12-27 DIAGNOSIS — K80.20 CALCULUS OF GALLBLADDER WITHOUT CHOLECYSTITIS WITHOUT OBSTRUCTION: ICD-10-CM

## 2023-12-27 DIAGNOSIS — K25.9 GASTRIC ULCER, UNSPECIFIED AS ACUTE OR CHRONIC, WITHOUT HEMORRHAGE OR PERFORATION: ICD-10-CM

## 2023-12-27 LAB
ALBUMIN SERPL ELPH-MCNC: 3 G/DL — LOW (ref 3.5–5)
ALBUMIN SERPL ELPH-MCNC: 3 G/DL — LOW (ref 3.5–5)
ALP SERPL-CCNC: 262 U/L — HIGH (ref 40–120)
ALP SERPL-CCNC: 262 U/L — HIGH (ref 40–120)
ALT FLD-CCNC: 65 U/L DA — HIGH (ref 10–60)
ALT FLD-CCNC: 65 U/L DA — HIGH (ref 10–60)
ANION GAP SERPL CALC-SCNC: 7 MMOL/L — SIGNIFICANT CHANGE UP (ref 5–17)
ANION GAP SERPL CALC-SCNC: 7 MMOL/L — SIGNIFICANT CHANGE UP (ref 5–17)
AST SERPL-CCNC: 28 U/L — SIGNIFICANT CHANGE UP (ref 10–40)
AST SERPL-CCNC: 28 U/L — SIGNIFICANT CHANGE UP (ref 10–40)
BASOPHILS # BLD AUTO: 0.03 K/UL — SIGNIFICANT CHANGE UP (ref 0–0.2)
BASOPHILS # BLD AUTO: 0.03 K/UL — SIGNIFICANT CHANGE UP (ref 0–0.2)
BASOPHILS NFR BLD AUTO: 0.4 % — SIGNIFICANT CHANGE UP (ref 0–2)
BASOPHILS NFR BLD AUTO: 0.4 % — SIGNIFICANT CHANGE UP (ref 0–2)
BILIRUB SERPL-MCNC: 0.4 MG/DL — SIGNIFICANT CHANGE UP (ref 0.2–1.2)
BILIRUB SERPL-MCNC: 0.4 MG/DL — SIGNIFICANT CHANGE UP (ref 0.2–1.2)
BUN SERPL-MCNC: 11 MG/DL — SIGNIFICANT CHANGE UP (ref 7–18)
BUN SERPL-MCNC: 11 MG/DL — SIGNIFICANT CHANGE UP (ref 7–18)
CALCIUM SERPL-MCNC: 9.1 MG/DL — SIGNIFICANT CHANGE UP (ref 8.4–10.5)
CALCIUM SERPL-MCNC: 9.1 MG/DL — SIGNIFICANT CHANGE UP (ref 8.4–10.5)
CHLORIDE SERPL-SCNC: 103 MMOL/L — SIGNIFICANT CHANGE UP (ref 96–108)
CHLORIDE SERPL-SCNC: 103 MMOL/L — SIGNIFICANT CHANGE UP (ref 96–108)
CO2 SERPL-SCNC: 27 MMOL/L — SIGNIFICANT CHANGE UP (ref 22–31)
CO2 SERPL-SCNC: 27 MMOL/L — SIGNIFICANT CHANGE UP (ref 22–31)
CREAT SERPL-MCNC: 0.74 MG/DL — SIGNIFICANT CHANGE UP (ref 0.5–1.3)
CREAT SERPL-MCNC: 0.74 MG/DL — SIGNIFICANT CHANGE UP (ref 0.5–1.3)
EGFR: 117 ML/MIN/1.73M2 — SIGNIFICANT CHANGE UP
EGFR: 117 ML/MIN/1.73M2 — SIGNIFICANT CHANGE UP
EOSINOPHIL # BLD AUTO: 0.09 K/UL — SIGNIFICANT CHANGE UP (ref 0–0.5)
EOSINOPHIL # BLD AUTO: 0.09 K/UL — SIGNIFICANT CHANGE UP (ref 0–0.5)
EOSINOPHIL NFR BLD AUTO: 1.1 % — SIGNIFICANT CHANGE UP (ref 0–6)
EOSINOPHIL NFR BLD AUTO: 1.1 % — SIGNIFICANT CHANGE UP (ref 0–6)
GLUCOSE SERPL-MCNC: 94 MG/DL — SIGNIFICANT CHANGE UP (ref 70–99)
GLUCOSE SERPL-MCNC: 94 MG/DL — SIGNIFICANT CHANGE UP (ref 70–99)
HCT VFR BLD CALC: 36.5 % — SIGNIFICANT CHANGE UP (ref 34.5–45)
HCT VFR BLD CALC: 36.5 % — SIGNIFICANT CHANGE UP (ref 34.5–45)
HGB BLD-MCNC: 12.7 G/DL — SIGNIFICANT CHANGE UP (ref 11.5–15.5)
HGB BLD-MCNC: 12.7 G/DL — SIGNIFICANT CHANGE UP (ref 11.5–15.5)
IMM GRANULOCYTES NFR BLD AUTO: 0.5 % — SIGNIFICANT CHANGE UP (ref 0–0.9)
IMM GRANULOCYTES NFR BLD AUTO: 0.5 % — SIGNIFICANT CHANGE UP (ref 0–0.9)
LYMPHOCYTES # BLD AUTO: 2.4 K/UL — SIGNIFICANT CHANGE UP (ref 1–3.3)
LYMPHOCYTES # BLD AUTO: 2.4 K/UL — SIGNIFICANT CHANGE UP (ref 1–3.3)
LYMPHOCYTES # BLD AUTO: 28.9 % — SIGNIFICANT CHANGE UP (ref 13–44)
LYMPHOCYTES # BLD AUTO: 28.9 % — SIGNIFICANT CHANGE UP (ref 13–44)
MAGNESIUM SERPL-MCNC: 1.8 MG/DL — SIGNIFICANT CHANGE UP (ref 1.6–2.6)
MAGNESIUM SERPL-MCNC: 1.8 MG/DL — SIGNIFICANT CHANGE UP (ref 1.6–2.6)
MCHC RBC-ENTMCNC: 27.7 PG — SIGNIFICANT CHANGE UP (ref 27–34)
MCHC RBC-ENTMCNC: 27.7 PG — SIGNIFICANT CHANGE UP (ref 27–34)
MCHC RBC-ENTMCNC: 34.8 GM/DL — SIGNIFICANT CHANGE UP (ref 32–36)
MCHC RBC-ENTMCNC: 34.8 GM/DL — SIGNIFICANT CHANGE UP (ref 32–36)
MCV RBC AUTO: 79.7 FL — LOW (ref 80–100)
MCV RBC AUTO: 79.7 FL — LOW (ref 80–100)
MONOCYTES # BLD AUTO: 0.54 K/UL — SIGNIFICANT CHANGE UP (ref 0–0.9)
MONOCYTES # BLD AUTO: 0.54 K/UL — SIGNIFICANT CHANGE UP (ref 0–0.9)
MONOCYTES NFR BLD AUTO: 6.5 % — SIGNIFICANT CHANGE UP (ref 2–14)
MONOCYTES NFR BLD AUTO: 6.5 % — SIGNIFICANT CHANGE UP (ref 2–14)
NEUTROPHILS # BLD AUTO: 5.2 K/UL — SIGNIFICANT CHANGE UP (ref 1.8–7.4)
NEUTROPHILS # BLD AUTO: 5.2 K/UL — SIGNIFICANT CHANGE UP (ref 1.8–7.4)
NEUTROPHILS NFR BLD AUTO: 62.6 % — SIGNIFICANT CHANGE UP (ref 43–77)
NEUTROPHILS NFR BLD AUTO: 62.6 % — SIGNIFICANT CHANGE UP (ref 43–77)
NRBC # BLD: 0 /100 WBCS — SIGNIFICANT CHANGE UP (ref 0–0)
NRBC # BLD: 0 /100 WBCS — SIGNIFICANT CHANGE UP (ref 0–0)
PHOSPHATE SERPL-MCNC: 4.7 MG/DL — HIGH (ref 2.5–4.5)
PHOSPHATE SERPL-MCNC: 4.7 MG/DL — HIGH (ref 2.5–4.5)
PLATELET # BLD AUTO: 348 K/UL — SIGNIFICANT CHANGE UP (ref 150–400)
PLATELET # BLD AUTO: 348 K/UL — SIGNIFICANT CHANGE UP (ref 150–400)
POTASSIUM SERPL-MCNC: 3.8 MMOL/L — SIGNIFICANT CHANGE UP (ref 3.5–5.3)
POTASSIUM SERPL-MCNC: 3.8 MMOL/L — SIGNIFICANT CHANGE UP (ref 3.5–5.3)
POTASSIUM SERPL-SCNC: 3.8 MMOL/L — SIGNIFICANT CHANGE UP (ref 3.5–5.3)
POTASSIUM SERPL-SCNC: 3.8 MMOL/L — SIGNIFICANT CHANGE UP (ref 3.5–5.3)
PROT SERPL-MCNC: 7.1 G/DL — SIGNIFICANT CHANGE UP (ref 6–8.3)
PROT SERPL-MCNC: 7.1 G/DL — SIGNIFICANT CHANGE UP (ref 6–8.3)
RBC # BLD: 4.58 M/UL — SIGNIFICANT CHANGE UP (ref 3.8–5.2)
RBC # BLD: 4.58 M/UL — SIGNIFICANT CHANGE UP (ref 3.8–5.2)
RBC # FLD: 13.3 % — SIGNIFICANT CHANGE UP (ref 10.3–14.5)
RBC # FLD: 13.3 % — SIGNIFICANT CHANGE UP (ref 10.3–14.5)
SODIUM SERPL-SCNC: 137 MMOL/L — SIGNIFICANT CHANGE UP (ref 135–145)
SODIUM SERPL-SCNC: 137 MMOL/L — SIGNIFICANT CHANGE UP (ref 135–145)
WBC # BLD: 8.3 K/UL — SIGNIFICANT CHANGE UP (ref 3.8–10.5)
WBC # BLD: 8.3 K/UL — SIGNIFICANT CHANGE UP (ref 3.8–10.5)
WBC # FLD AUTO: 8.3 K/UL — SIGNIFICANT CHANGE UP (ref 3.8–10.5)
WBC # FLD AUTO: 8.3 K/UL — SIGNIFICANT CHANGE UP (ref 3.8–10.5)

## 2023-12-27 PROCEDURE — 83605 ASSAY OF LACTIC ACID: CPT

## 2023-12-27 PROCEDURE — 83735 ASSAY OF MAGNESIUM: CPT

## 2023-12-27 PROCEDURE — 83690 ASSAY OF LIPASE: CPT

## 2023-12-27 PROCEDURE — 99232 SBSQ HOSP IP/OBS MODERATE 35: CPT

## 2023-12-27 PROCEDURE — 84478 ASSAY OF TRIGLYCERIDES: CPT

## 2023-12-27 PROCEDURE — 99239 HOSP IP/OBS DSCHRG MGMT >30: CPT | Mod: GC

## 2023-12-27 PROCEDURE — 85025 COMPLETE CBC W/AUTO DIFF WBC: CPT

## 2023-12-27 PROCEDURE — 36415 COLL VENOUS BLD VENIPUNCTURE: CPT

## 2023-12-27 PROCEDURE — 85610 PROTHROMBIN TIME: CPT

## 2023-12-27 PROCEDURE — C2625: CPT

## 2023-12-27 PROCEDURE — 86901 BLOOD TYPING SEROLOGIC RH(D): CPT

## 2023-12-27 PROCEDURE — 81001 URINALYSIS AUTO W/SCOPE: CPT

## 2023-12-27 PROCEDURE — 86850 RBC ANTIBODY SCREEN: CPT

## 2023-12-27 PROCEDURE — 74177 CT ABD & PELVIS W/CONTRAST: CPT | Mod: MA

## 2023-12-27 PROCEDURE — 87186 SC STD MICRODIL/AGAR DIL: CPT

## 2023-12-27 PROCEDURE — 96375 TX/PRO/DX INJ NEW DRUG ADDON: CPT

## 2023-12-27 PROCEDURE — 85652 RBC SED RATE AUTOMATED: CPT

## 2023-12-27 PROCEDURE — C1889: CPT

## 2023-12-27 PROCEDURE — C1769: CPT

## 2023-12-27 PROCEDURE — 99285 EMERGENCY DEPT VISIT HI MDM: CPT | Mod: 25

## 2023-12-27 PROCEDURE — 86703 HIV-1/HIV-2 1 RESULT ANTBDY: CPT

## 2023-12-27 PROCEDURE — 88312 SPECIAL STAINS GROUP 1: CPT

## 2023-12-27 PROCEDURE — 86900 BLOOD TYPING SEROLOGIC ABO: CPT

## 2023-12-27 PROCEDURE — 84100 ASSAY OF PHOSPHORUS: CPT

## 2023-12-27 PROCEDURE — 80053 COMPREHEN METABOLIC PANEL: CPT

## 2023-12-27 PROCEDURE — 82977 ASSAY OF GGT: CPT

## 2023-12-27 PROCEDURE — T1013: CPT

## 2023-12-27 PROCEDURE — 96374 THER/PROPH/DIAG INJ IV PUSH: CPT

## 2023-12-27 PROCEDURE — 86140 C-REACTIVE PROTEIN: CPT

## 2023-12-27 PROCEDURE — 88305 TISSUE EXAM BY PATHOLOGIST: CPT

## 2023-12-27 PROCEDURE — 96361 HYDRATE IV INFUSION ADD-ON: CPT

## 2023-12-27 PROCEDURE — 84702 CHORIONIC GONADOTROPIN TEST: CPT

## 2023-12-27 PROCEDURE — 87086 URINE CULTURE/COLONY COUNT: CPT

## 2023-12-27 PROCEDURE — 76000 FLUOROSCOPY <1 HR PHYS/QHP: CPT

## 2023-12-27 PROCEDURE — 84484 ASSAY OF TROPONIN QUANT: CPT

## 2023-12-27 RX ORDER — PANTOPRAZOLE SODIUM 20 MG/1
1 TABLET, DELAYED RELEASE ORAL
Qty: 60 | Refills: 0 | DISCHARGE
Start: 2023-12-27 | End: 2024-01-25

## 2023-12-27 RX ORDER — OXYCODONE HYDROCHLORIDE 5 MG/1
5 TABLET ORAL EVERY 6 HOURS
Refills: 0 | Status: DISCONTINUED | OUTPATIENT
Start: 2023-12-27 | End: 2023-12-27

## 2023-12-27 RX ORDER — PANTOPRAZOLE SODIUM 20 MG/1
1 TABLET, DELAYED RELEASE ORAL
Refills: 0
Start: 2023-12-27

## 2023-12-27 RX ORDER — PANTOPRAZOLE SODIUM 20 MG/1
1 TABLET, DELAYED RELEASE ORAL
Qty: 60 | Refills: 0
Start: 2023-12-27 | End: 2024-01-25

## 2023-12-27 RX ORDER — ACETAMINOPHEN 500 MG
650 TABLET ORAL EVERY 6 HOURS
Refills: 0 | Status: DISCONTINUED | OUTPATIENT
Start: 2023-12-27 | End: 2023-12-27

## 2023-12-27 RX ORDER — PANTOPRAZOLE SODIUM 20 MG/1
1 TABLET, DELAYED RELEASE ORAL
Qty: 0 | Refills: 0 | DISCHARGE
Start: 2023-12-27

## 2023-12-27 RX ORDER — OXYCODONE HYDROCHLORIDE 5 MG/1
2.5 TABLET ORAL EVERY 6 HOURS
Refills: 0 | Status: DISCONTINUED | OUTPATIENT
Start: 2023-12-27 | End: 2023-12-27

## 2023-12-27 RX ADMIN — PANTOPRAZOLE SODIUM 40 MILLIGRAM(S): 20 TABLET, DELAYED RELEASE ORAL at 05:36

## 2023-12-27 RX ADMIN — Medication 650 MILLIGRAM(S): at 12:40

## 2023-12-27 RX ADMIN — ENOXAPARIN SODIUM 40 MILLIGRAM(S): 100 INJECTION SUBCUTANEOUS at 17:37

## 2023-12-27 RX ADMIN — Medication 650 MILLIGRAM(S): at 17:38

## 2023-12-27 RX ADMIN — PANTOPRAZOLE SODIUM 40 MILLIGRAM(S): 20 TABLET, DELAYED RELEASE ORAL at 17:38

## 2023-12-27 NOTE — PROGRESS NOTE ADULT - SUBJECTIVE AND OBJECTIVE BOX
GI Progress Note - incomplete    Patient is a 23y old  Female who presents with a chief complaint of Acute Pancreatitis (26 Dec 2023 19:56)    GI was consulted for CBD dilatation.    24-HOUR INTERVAL EVENTS: Patient resting in bed. s/p EGD/EUS/ERCP yesterday with removal of sludge & plastic biliary stent placement, discussed results and follow up plan, questions answered.     MEDICATIONS  (STANDING):  acetaminophen     Tablet .. 650 milliGRAM(s) Oral every 6 hours  enoxaparin Injectable 40 milliGRAM(s) SubCutaneous every 24 hours  influenza   Vaccine 0.5 milliLiter(s) IntraMuscular once  lactated ringers. 1000 milliLiter(s) (200 mL/Hr) IV Continuous <Continuous>  naloxone Injectable 0.4 milliGRAM(s) IV Push once  pantoprazole    Tablet 40 milliGRAM(s) Oral two times a day    MEDICATIONS  (PRN):  metoclopramide Injectable 10 milliGRAM(s) IV Push once PRN Nausea and/or Vomiting  ondansetron Injectable 4 milliGRAM(s) IV Push every 8 hours PRN Nausea and/or Vomiting  oxyCODONE    IR 2.5 milliGRAM(s) Oral every 6 hours PRN Moderate Pain (4 - 6)  oxyCODONE    IR 5 milliGRAM(s) Oral every 6 hours PRN Severe Pain (7 - 10)    __________________________________________________  REVIEW OF SYSTEMS:  A detailed set of ROS were asked and negative except those outlined in GI HPI above/below.   ________________________________________________  PHYSICAL EXAM    Vital Signs Last 24 Hrs  T(C): 36.9 (27 Dec 2023 04:49), Max: 37.2 (26 Dec 2023 13:19)  T(F): 98.4 (27 Dec 2023 04:49), Max: 99 (26 Dec 2023 13:19)  HR: 52 (27 Dec 2023 04:49) (48 - 59)  BP: 126/84 (27 Dec 2023 04:49) (113/73 - 134/80)  BP(mean): --  RR: 17 (27 Dec 2023 04:49) (12 - 18)  SpO2: 99% (27 Dec 2023 04:49) (96% - 100%)    Parameters below as of 27 Dec 2023 04:49  Patient On (Oxygen Delivery Method): room air        GEN: NAD  HEENT: EOMI, conjunctivae anicteric, neck supple, moist mucous membranes  PULM: LCTAB, no wheezing, rales, or rhonchi  CV: RRR, no m/r/g  GI: soft, NT, ND; +BS in all four quadrants, no ascites, no Garcia's sign  MSK: GORE, no edema  NEURO: A&O x 3, no gross deficits  _________________________________________________  LABS:                        12.7   8.30  )-----------( 348      ( 27 Dec 2023 06:37 )             36.5     12-27    137  |  103  |  11  ----------------------------<  94  3.8   |  27  |  0.74    Ca    9.1      27 Dec 2023 06:37  Phos  4.7     12-27  Mg     1.8     12-27    TPro  7.1  /  Alb  3.0<L>  /  TBili  0.4  /  DBili  x   /  AST  28  /  ALT  65<H>  /  AlkPhos  262<H>  12-27    PT/INR - ( 25 Dec 2023 20:00 )   PT: 10.8 sec;   INR: 0.95 ratio           Urinalysis Basic - ( 27 Dec 2023 06:37 )    Color: x / Appearance: x / SG: x / pH: x  Gluc: 94 mg/dL / Ketone: x  / Bili: x / Urobili: x   Blood: x / Protein: x / Nitrite: x   Leuk Esterase: x / RBC: x / WBC x   Sq Epi: x / Non Sq Epi: x / Bacteria: x      CAPILLARY BLOOD GLUCOSE            RADIOLOGY & ADDITIONAL TESTS:    EGD/EUS/ERCP (12/26/23):  ERCP Findings:   film was obtained which was normal. The duodenoscope was advanced into the  second portion of the duodenum. The major papilla was identified which was  mildly erythematous but otherwise normal. A 0.035 inch guidewire was passed into  the biliary tree. The bile duct was deeply cannulated using a Hydratome  sphincterotome. Contrast was injected. There were a few filling defects in the  distal bile duct consistent with sludge. The bile duct was not dilated. The  cystic duct and gallbladder filled. Biliary sphincterotomy was performed using  ERBE cautery. The biliary tree was swept using a 9-12 mm biliary extraction  balloon. Sludge was removed from the bile duct. Final balloon occlusion  cholangiogram showed no residual filling defects. There was mild post  sphincterotomy oozing from the lateral margin of the sphincterotomy site which  was persistent despite balloon tamponade. To facilitate drainage, one 10 Fr by 5  cm plastic biliary stent with one internal flange and one external flange  (Advanix) was placed into the common bile duct. The stent was in good position.  There was excellent drainage of bile and contrast at conclusion. There was no  bleeding after stent placement at conclusion.    EGD Findings:  Esophagus: Normal.  Stomach: One 5 mm clean based ulcer in the gastric antrum with multiple  scattered erosions and erythema noted throughout. Biopsies taken with a cold  forceps from the antrum, body, and incisura for histology.  Duodenum: Normal.      EUS Findings:  Biliary tree: There were extensive small hyperechoic, shadowing lesions along  with hyperechoic material in the gallbladder body and neck consistent with  stones. The common bile duct was not dilated but appeared to contain a small  amount of hyperechoic dependent material suspicious for sludge/stone debris.  Ampulla: Normal.  Pancreas: Mild lobularity and hypoechogenicity diffusely consistent with  pancreatitis changes. No pancreatic ductal dilation.  Liver: Normal visualized portions of the left lobe.  Lymph nodes: No lymphadenopathy seen.    Summary:  Biliary sludge. Ductal clearance with biliary sphincterotomy and balloon  extraction. Plastic biliary stent placed to facilitate drainage. One clean based  antral ulcer and erosive gastropathy on preceding EGD exam, biopsied. GI Progress Note    Patient is a 23y old  Female who presents with a chief complaint of Acute Pancreatitis (26 Dec 2023 19:56)    GI was consulted for CBD dilatation.    24-HOUR INTERVAL EVENTS: Patient resting in bed, offers no acute complaints, endorses feeling well, tolerating diet, has an appetite, +Flatus, denies abd pain/tenderness, n/v. s/p EGD/EUS/ERCP yesterday with removal of sludge & plastic biliary stent placement, discussed results and follow up plan, questions answered.    #[id number] Wolfgang.    MEDICATIONS  (STANDING):  acetaminophen     Tablet .. 650 milliGRAM(s) Oral every 6 hours  enoxaparin Injectable 40 milliGRAM(s) SubCutaneous every 24 hours  influenza   Vaccine 0.5 milliLiter(s) IntraMuscular once  lactated ringers. 1000 milliLiter(s) (200 mL/Hr) IV Continuous <Continuous>  naloxone Injectable 0.4 milliGRAM(s) IV Push once  pantoprazole    Tablet 40 milliGRAM(s) Oral two times a day    MEDICATIONS  (PRN):  metoclopramide Injectable 10 milliGRAM(s) IV Push once PRN Nausea and/or Vomiting  ondansetron Injectable 4 milliGRAM(s) IV Push every 8 hours PRN Nausea and/or Vomiting  oxyCODONE    IR 2.5 milliGRAM(s) Oral every 6 hours PRN Moderate Pain (4 - 6)  oxyCODONE    IR 5 milliGRAM(s) Oral every 6 hours PRN Severe Pain (7 - 10)    __________________________________________________  REVIEW OF SYSTEMS:  A detailed set of ROS were asked and negative except those outlined in GI HPI above/below.   ________________________________________________  PHYSICAL EXAM    Vital Signs Last 24 Hrs  T(C): 36.9 (27 Dec 2023 04:49), Max: 37.2 (26 Dec 2023 13:19)  T(F): 98.4 (27 Dec 2023 04:49), Max: 99 (26 Dec 2023 13:19)  HR: 52 (27 Dec 2023 04:49) (48 - 59)  BP: 126/84 (27 Dec 2023 04:49) (113/73 - 134/80)  BP(mean): --  RR: 17 (27 Dec 2023 04:49) (12 - 18)  SpO2: 99% (27 Dec 2023 04:49) (96% - 100%)    Parameters below as of 27 Dec 2023 04:49  Patient On (Oxygen Delivery Method): room air        GEN: NAD  HEENT: EOMI, conjunctivae anicteric, neck supple, moist mucous membranes  PULM: LCTAB, no wheezing, rales, or rhonchi  CV: RRR, no m/r/g  GI: soft, NT, ND; +BS in all four quadrants, no ascites, no Garcia's sign  MSK: GORE, no edema  NEURO: A&O x 3, no gross deficits  _________________________________________________  LABS:                        12.7   8.30  )-----------( 348      ( 27 Dec 2023 06:37 )             36.5     12-27    137  |  103  |  11  ----------------------------<  94  3.8   |  27  |  0.74    Ca    9.1      27 Dec 2023 06:37  Phos  4.7     12-27  Mg     1.8     12-27    TPro  7.1  /  Alb  3.0<L>  /  TBili  0.4  /  DBili  x   /  AST  28  /  ALT  65<H>  /  AlkPhos  262<H>  12-27    PT/INR - ( 25 Dec 2023 20:00 )   PT: 10.8 sec;   INR: 0.95 ratio           Urinalysis Basic - ( 27 Dec 2023 06:37 )    Color: x / Appearance: x / SG: x / pH: x  Gluc: 94 mg/dL / Ketone: x  / Bili: x / Urobili: x   Blood: x / Protein: x / Nitrite: x   Leuk Esterase: x / RBC: x / WBC x   Sq Epi: x / Non Sq Epi: x / Bacteria: x    RADIOLOGY & ADDITIONAL TESTS:    EGD/EUS/ERCP (12/26/23):  ERCP Findings:   film was obtained which was normal. The duodenoscope was advanced into the  second portion of the duodenum. The major papilla was identified which was  mildly erythematous but otherwise normal. A 0.035 inch guidewire was passed into  the biliary tree. The bile duct was deeply cannulated using a Hydratome  sphincterotome. Contrast was injected. There were a few filling defects in the  distal bile duct consistent with sludge. The bile duct was not dilated. The  cystic duct and gallbladder filled. Biliary sphincterotomy was performed using  ERBE cautery. The biliary tree was swept using a 9-12 mm biliary extraction  balloon. Sludge was removed from the bile duct. Final balloon occlusion  cholangiogram showed no residual filling defects. There was mild post  sphincterotomy oozing from the lateral margin of the sphincterotomy site which  was persistent despite balloon tamponade. To facilitate drainage, one 10 Fr by 5  cm plastic biliary stent with one internal flange and one external flange  (Advanix) was placed into the common bile duct. The stent was in good position.  There was excellent drainage of bile and contrast at conclusion. There was no  bleeding after stent placement at conclusion.    EGD Findings:  Esophagus: Normal.  Stomach: One 5 mm clean based ulcer in the gastric antrum with multiple  scattered erosions and erythema noted throughout. Biopsies taken with a cold  forceps from the antrum, body, and incisura for histology.  Duodenum: Normal.      EUS Findings:  Biliary tree: There were extensive small hyperechoic, shadowing lesions along  with hyperechoic material in the gallbladder body and neck consistent with  stones. The common bile duct was not dilated but appeared to contain a small  amount of hyperechoic dependent material suspicious for sludge/stone debris.  Ampulla: Normal.  Pancreas: Mild lobularity and hypoechogenicity diffusely consistent with  pancreatitis changes. No pancreatic ductal dilation.  Liver: Normal visualized portions of the left lobe.  Lymph nodes: No lymphadenopathy seen.    Summary:  Biliary sludge. Ductal clearance with biliary sphincterotomy and balloon  extraction. Plastic biliary stent placed to facilitate drainage. One clean based  antral ulcer and erosive gastropathy on preceding EGD exam, biopsied. GI Progress Note    Patient is a 23y old  Female who presents with a chief complaint of Acute Pancreatitis (26 Dec 2023 19:56)    GI was consulted for CBD dilatation.    24-HOUR INTERVAL EVENTS: Patient resting in bed, offers no acute complaints, endorses feeling well, tolerating diet, has an appetite, +Flatus, denies abd pain/tenderness, n/v. s/p EGD/EUS/ERCP yesterday with removal of sludge & plastic biliary stent placement, discussed results and follow up plan, questions answered.    #[id number] Wolfgang.    MEDICATIONS  (STANDING):  acetaminophen     Tablet .. 650 milliGRAM(s) Oral every 6 hours  enoxaparin Injectable 40 milliGRAM(s) SubCutaneous every 24 hours  influenza   Vaccine 0.5 milliLiter(s) IntraMuscular once  lactated ringers. 1000 milliLiter(s) (200 mL/Hr) IV Continuous <Continuous>  naloxone Injectable 0.4 milliGRAM(s) IV Push once  pantoprazole    Tablet 40 milliGRAM(s) Oral two times a day    MEDICATIONS  (PRN):  metoclopramide Injectable 10 milliGRAM(s) IV Push once PRN Nausea and/or Vomiting  ondansetron Injectable 4 milliGRAM(s) IV Push every 8 hours PRN Nausea and/or Vomiting  oxyCODONE    IR 2.5 milliGRAM(s) Oral every 6 hours PRN Moderate Pain (4 - 6)  oxyCODONE    IR 5 milliGRAM(s) Oral every 6 hours PRN Severe Pain (7 - 10)    __________________________________________________  REVIEW OF SYSTEMS:  A detailed set of ROS were asked and negative except those outlined in GI HPI above/below.   ________________________________________________  PHYSICAL EXAM    Vital Signs Last 24 Hrs  T(C): 36.9 (27 Dec 2023 04:49), Max: 37.2 (26 Dec 2023 13:19)  T(F): 98.4 (27 Dec 2023 04:49), Max: 99 (26 Dec 2023 13:19)  HR: 52 (27 Dec 2023 04:49) (48 - 59)  BP: 126/84 (27 Dec 2023 04:49) (113/73 - 134/80)  BP(mean): --  RR: 17 (27 Dec 2023 04:49) (12 - 18)  SpO2: 99% (27 Dec 2023 04:49) (96% - 100%)    Parameters below as of 27 Dec 2023 04:49  Patient On (Oxygen Delivery Method): room air        GEN: NAD  HEENT: EOMI, conjunctivae anicteric, neck supple, moist mucous membranes  PULM: LCTAB, no wheezing, rales, or rhonchi  CV: RRR, no m/r/g  GI: soft, NT, ND; +BS in all four quadrants, no ascites, no Garcia's sign  MSK: GORE, no edema  NEURO: A&O x 3, no gross deficits  _________________________________________________  LABS:                        12.7   8.30  )-----------( 348      ( 27 Dec 2023 06:37 )             36.5     12-27    137  |  103  |  11  ----------------------------<  94  3.8   |  27  |  0.74    Ca    9.1      27 Dec 2023 06:37  Phos  4.7     12-27  Mg     1.8     12-27    TPro  7.1  /  Alb  3.0<L>  /  TBili  0.4  /  DBili  x   /  AST  28  /  ALT  65<H>  /  AlkPhos  262<H>  12-27    PT/INR - ( 25 Dec 2023 20:00 )   PT: 10.8 sec;   INR: 0.95 ratio           Urinalysis Basic - ( 27 Dec 2023 06:37 )    Color: x / Appearance: x / SG: x / pH: x  Gluc: 94 mg/dL / Ketone: x  / Bili: x / Urobili: x   Blood: x / Protein: x / Nitrite: x   Leuk Esterase: x / RBC: x / WBC x   Sq Epi: x / Non Sq Epi: x / Bacteria: x    RADIOLOGY & ADDITIONAL TESTS:    EGD/EUS/ERCP (12/26/23):  ERCP Findings:   film was obtained which was normal. The duodenoscope was advanced into the  second portion of the duodenum. The major papilla was identified which was  mildly erythematous but otherwise normal. A 0.035 inch guidewire was passed into  the biliary tree. The bile duct was deeply cannulated using a Hydratome  sphincterotome. Contrast was injected. There were a few filling defects in the  distal bile duct consistent with sludge. The bile duct was not dilated. The  cystic duct and gallbladder filled. Biliary sphincterotomy was performed using  ERBE cautery. The biliary tree was swept using a 9-12 mm biliary extraction  balloon. Sludge was removed from the bile duct. Final balloon occlusion  cholangiogram showed no residual filling defects. There was mild post  sphincterotomy oozing from the lateral margin of the sphincterotomy site which  was persistent despite balloon tamponade. To facilitate drainage, one 10 Fr by 5  cm plastic biliary stent with one internal flange and one external flange  (Advanix) was placed into the common bile duct. The stent was in good position.  There was excellent drainage of bile and contrast at conclusion. There was no  bleeding after stent placement at conclusion.    EGD Findings:  Esophagus: Normal.  Stomach: One 5 mm clean based ulcer in the gastric antrum with multiple  scattered erosions and erythema noted throughout. Biopsies taken with a cold  forceps from the antrum, body, and incisura for histology.  Duodenum: Normal.      EUS Findings:  Biliary tree: There were extensive small hyperechoic, shadowing lesions along  with hyperechoic material in the gallbladder body and neck consistent with  stones. The common bile duct was not dilated but appeared to contain a small  amount of hyperechoic dependent material suspicious for sludge/stone debris.  Ampulla: Normal.  Pancreas: Mild lobularity and hypoechogenicity diffusely consistent with  pancreatitis changes. No pancreatic ductal dilation.  Liver: Normal visualized portions of the left lobe.  Lymph nodes: No lymphadenopathy seen.    Summary:  Biliary sludge. Ductal clearance with biliary sphincterotomy and balloon  extraction. Plastic biliary stent placed to facilitate drainage. One clean based  antral ulcer and erosive gastropathy on preceding EGD exam, biopsied.

## 2023-12-27 NOTE — CONSULT NOTE ADULT - PROBLEM SELECTOR RECOMMENDATION 9
- Advance diet as tolerated.   - OOB/ambulation.  - No surgical intervention warranted at this time.  - Recommend cholecystectomy to prevent recurrence in 1 month due to recent  (2023). Advised patient to follow-up with surgeon Dr. Fraga outpatient.  - Discussed plan with surgeon Dr. Fraga. - Advance diet as tolerated.   - OOB/ambulation.  - No surgical intervention warranted at this time.  - Recommend cholecystectomy to prevent recurrence in 1 month due to recent  (2023). Advised patient to follow-up with surgeon Dr. Fraga outpatient  - Discussed plan with surgeon Dr. Fraga

## 2023-12-27 NOTE — DISCHARGE NOTE NURSING/CASE MANAGEMENT/SOCIAL WORK - NSDCPEFALRISK_GEN_ALL_CORE
For information on Fall & Injury Prevention, visit: [email or web address] OR  [email or web address] OR  https://www.cdc.gov/steadi/patient.html For information on Fall & Injury Prevention, visit: [email or web address] OR  [email or web address] OR  https://www.cdc.gov/steadi/patient.html

## 2023-12-27 NOTE — CHART NOTE - NSCHARTNOTEFT_GEN_A_CORE
Patient complaining of swollen lip post anaesthesia, no SOB no wheezing no rashes. Chest clear on auscultation. Tongue normal size, airways patent. Patient saturating well on room air, hemodynamically stable. Patient given cool compress for lip.  used [id number] Patient complaining of swollen lip post anaesthesia, no SOB no wheezing no rashes. Chest clear on auscultation. Tongue normal size, airways patent. Patient saturating well on room air, hemodynamically stable. Patient given cool compress for lip.  used [id number]

## 2023-12-27 NOTE — CONSULT NOTE ADULT - ASSESSMENT
23 year old female with PMHx cholelithiasis, hepatic steatosis, acute pancreatitis and PSHx of  (2023) admitted for acute pancreatitis with cholelithiasis on CT scan. Asymptomatic, symptoms self-resolved. 23 year old female with PMHx cholelithiasis, hepatic steatosis, acute pancreatitis and PSHx of  (2023) admitted for acute pancreatitis with cholelithiasis on CT scan  clinically improving, s/p ERCP with balloon extraction and stent placement

## 2023-12-27 NOTE — PROGRESS NOTE ADULT - PROBLEM SELECTOR PLAN 4
No Hx of alcohol abuse.   CT ab/pelvis noted above  Likely secondary to cholelithiasis and acute pancreatitis.   Now downtrending  No WBC count   Patient having subjective fever and chills  GGT, ESR, CRP elevated  May have acute cholangitis as well

## 2023-12-27 NOTE — PROGRESS NOTE ADULT - ASSESSMENT
Patient is a 23F with a PMHx of known cholelithiasis, hepatic steatosis, gallstone pancreatitis, recent  delivery () who presented to the ED with epigastric pain. GI was consulted for CBD dilatation.    #Abdominal pain  #Elevated transaminases  #Elevated alk phos  #Cholelithiasis  #Acute pancreatitis  #Hepatic steatosis  #Gastric ulcer  #Erosive gastropathy  Patient presented with abdominal pain, found to have acute pancreatitis likely gallstone induced. Labs on admission notable for lipase >5000, TBili 0.4 -> 0.3, alk phos 329 -> 92, AST 86 -> 60,  -> 91.  , ESR 30. Intermediate risk for choledocholithiasis, underwent EGD/EUS/ERCP yesterday - biliary sludge cleared with sphincterotomy and balloon extraction, plastic biliary stent placed, one clean based antral ulcer and erosive gastropathy (biospied).   : TBili 0.4, alk phos 262, AST 28, ALT 65. VSS.     	- s/p EGD/EUS/ERCP   	- Diet as tolerated  	- Await pathology  	- Avoid NSAIDs (ibuprofen, motrin, aleve, naproxen, toardol, etc.) for 4 weeks  	- PO Omeprazole 40mg BID  	- Repeat EGD to confirm ulcer healing and ERCP for stent removal in 2-3 months  	- Surgery consult for cholecystectomy to prevent recurrence    This note and its recommendations herein are preliminary until such time as cosigned by an attending.   Patient is a 23F with a PMHx of known cholelithiasis, hepatic steatosis, gallstone pancreatitis, recent  delivery () who presented to the ED with epigastric pain. GI was consulted for CBD dilatation.    #Abdominal pain  #Elevated transaminases  #Elevated alk phos  #Cholelithiasis  #Acute pancreatitis  #Hepatic steatosis  #Gastric ulcer  #Erosive gastropathy  Patient presented with abdominal pain, found to have acute pancreatitis likely gallstone induced. Labs on admission notable for lipase >5000, TBili 0.4 -> 0.3, alk phos 329 -> 92, AST 86 -> 60,  -> 91.  , ESR 30. Intermediate risk for choledocholithiasis, underwent EGD/EUS/ERCP yesterday - biliary sludge cleared with sphincterotomy and balloon extraction, plastic biliary stent placed, one clean based antral ulcer and erosive gastropathy (biospied).   : TBili 0.4, alk phos 262, AST 28, ALT 65. VSS. Tolerating diet, bowel function intact, abd pain resolved.    	- s/p EGD/EUS/ERCP   	- Diet as tolerated  	- Await pathology  	- Avoid NSAIDs (ibuprofen, motrin, aleve, naproxen, toardol, etc.) for 4 weeks  	- PO Omeprazole 40mg BID  	- Repeat EGD to confirm ulcer healing and ERCP for stent removal in 2-3 months  	- Surgery consult for cholecystectomy to prevent recurrence    This note and its recommendations herein are preliminary until such time as cosigned by an attending.    GI will sign off at this time.  Thank you for involving us in the care of Ms. Samantha Jimenez.  Please re-consult GI PRN.

## 2023-12-27 NOTE — PROGRESS NOTE ADULT - ASSESSMENT
A 23 year old female, from home, w/ PMHx of Gallstone pancreatitis, presented to the ED complaining of rapidly progressing epigastric pain radiating to her back, Lipase >5000, CT scan showing cholelithiasis, CBD dilatation and pancreatitis.. Admitted to medicine for acute gallstone pancreatitis management.  A 23 year old female, from home, w/ PMHx of  and Gallstone pancreatitis, presented to the ED complaining of rapidly progressing epigastric pain radiating to her back, Lipase >5000, CT scan showing cholelithiasis, CBD dilatation and pancreatitis.. Admitted to medicine for acute gallstone pancreatitis management.

## 2023-12-27 NOTE — PROGRESS NOTE ADULT - PROBLEM SELECTOR PLAN 1
CT ab/pelvis - Cholelithiasis. Acute pancreatitis. Small peripancreatic free fluid. No organized peripancreatic fluid collection. Prominent central intrahepatic biliary ducts. Common bile ductal dilatation.  Likely gallstone pancreatitis. No alcohol intake.   Triglycerides 187, Lipase>5000  12/26 EUS +ERCP s/p stent placed   c/w Tylenol 650 mg PO q6hr, Morphine 2mg IV q4hr PRN for moderate, Morphine 4mg IV q4hr PRN for severe   c/w Zofran 4 mg IV q8hr PRN   c/w LR at 100 cc for 24 hours  Advance diet as tolerated   Trend BUN, hematocrit, repeat CBC, CMP CT ab/pelvis - Cholelithiasis. Acute pancreatitis. Small peripancreatic free fluid. No organized peripancreatic fluid collection. Prominent central intrahepatic biliary ducts. Common bile ductal dilatation.  Likely gallstone pancreatitis. No alcohol intake.   Triglycerides 187, Lipase>5000  12/26 EUS +ERCP s/p stent placed  c/w Tylenol 650 mg PO q6hr, Morphine 2mg IV q4hr PRN for moderate, Morphine 4mg IV q4hr PRN for severe   c/w Zofran 4 mg IV q8hr PRN   c/w LR at 100 cc for 24 hours  Advance diet as tolerated   Trend BUN, hematocrit, repeat CBC, CMP

## 2023-12-27 NOTE — PROGRESS NOTE ADULT - SUBJECTIVE AND OBJECTIVE BOX
Chief complaint: Patient is a 23y old  Female who presents with a chief complaint of Acute Pancreatitis (27 Dec 2023 08:33)      NATHALY OBRIEN is a 23y year old Female     INTERVAL HPI/OVERNIGHT EVENTS: Patient was complaining of lip swelling overnight. V/S were stable and lung auscultation appreciated no wheezing.  Patient examined at bedside this AM.  Patient reports feeling nauseous after dinner (CLD) but no vomiting.     REVIEW OF SYSTEMS:  CONSTITUTIONAL: No fever, weight loss, or fatigue  EYES: No eye pain, visual disturbances, or discharge  ENMT:  +lip swelling; No difficulty hearing, tinnitus, vertigo; No sinus or throat pain  NECK: No pain or stiffness  RESPIRATORY: No cough, wheezing, chills or hemoptysis; No shortness of breath  CARDIOVASCULAR: No chest pain, palpitations, dizziness, or leg swelling  GASTROINTESTINAL: +nauseated after eating; No abdominal or epigastric pain. No vomiting, or hematemesis; No diarrhea or constipation. No melena or hematochezia.  GENITOURINARY: No dysuria, frequency, hematuria, or incontinence  NEUROLOGICAL: No headaches, memory loss, loss of strength, numbness, or tremors  SKIN: No itching, burning, rashes, or lesions   MUSCULOSKELETAL: No joint pain or swelling; No muscle, back, or extremity pain  PSYCHIATRIC: No depression, anxiety, mood swings, or difficulty sleeping  HEME/LYMPH: No easy bruising, or bleeding gums  ALLERY AND IMMUNOLOGIC: No hives or eczema    FAMILY HISTORY:  No pertinent family history        T(C): 36.9 (12-27-23 @ 04:49), Max: 37.2 (12-26-23 @ 13:19)  HR: 52 (12-27-23 @ 04:49) (48 - 59)  BP: 126/84 (12-27-23 @ 04:49) (113/73 - 134/80)  RR: 17 (12-27-23 @ 04:49) (12 - 18)  SpO2: 99% (12-27-23 @ 04:49) (96% - 100%)  Wt(kg): --Vital Signs Last 24 Hrs  T(C): 36.9 (27 Dec 2023 04:49), Max: 37.2 (26 Dec 2023 13:19)  T(F): 98.4 (27 Dec 2023 04:49), Max: 99 (26 Dec 2023 13:19)  HR: 52 (27 Dec 2023 04:49) (48 - 59)  BP: 126/84 (27 Dec 2023 04:49) (113/73 - 134/80)  BP(mean): --  RR: 17 (27 Dec 2023 04:49) (12 - 18)  SpO2: 99% (27 Dec 2023 04:49) (96% - 100%)    Parameters below as of 27 Dec 2023 04:49  Patient On (Oxygen Delivery Method): room air        PHYSICAL EXAM:  GENERAL: NAD, laying comfortably in bed   HEAD:  Atraumatic, Normocephalic  EYES: EOMI, PERRLA, conjunctiva and sclera clear  ENMT: No tonsillar erythema, exudates, or enlargement; Moist mucous membranes.   NECK: Supple, No JVD  NERVOUS SYSTEM:  Alert & Oriented X3, Good concentration; Motor Strength 5/5 B/L upper and lower extremities  CHEST/LUNG: Clear to percussion bilaterally; No rales, rhonchi, wheezing, or rubs  HEART: Regular rate and rhythm; No murmurs, rubs, or gallops  ABDOMEN: Soft, Nontender, Nondistended; Bowel sounds present  EXTREMITIES: No clubbing, cyanosis, or edema  SKIN: No rashes or lesions    Consultant(s) Notes Reviewed:  [x ] YES  [ ] NO  Care Discussed with Consultants/Other Providers [ x] YES  [ ] NO    LABS:                        12.7   8.30  )-----------( 348      ( 27 Dec 2023 06:37 )             36.5       12-27    137  |  103  |  11  ----------------------------<  94  3.8   |  27  |  0.74    Ca    9.1      27 Dec 2023 06:37  Phos  4.7     12-27  Mg     1.8     12-27    TPro  7.1  /  Alb  3.0<L>  /  TBili  0.4  /  DBili  x   /  AST  28  /  ALT  65<H>  /  AlkPhos  262<H>  12-27        RADIOLOGY & ADDITIONAL TESTS:    Imaging Personally Reviewed:  [ ] YES  [ ] NO  acetaminophen     Tablet .. 650 milliGRAM(s) Oral every 6 hours  enoxaparin Injectable 40 milliGRAM(s) SubCutaneous every 24 hours  influenza   Vaccine 0.5 milliLiter(s) IntraMuscular once  lactated ringers. 1000 milliLiter(s) IV Continuous <Continuous>  metoclopramide Injectable 10 milliGRAM(s) IV Push once PRN  naloxone Injectable 0.4 milliGRAM(s) IV Push once  ondansetron Injectable 4 milliGRAM(s) IV Push every 8 hours PRN  oxyCODONE    IR 2.5 milliGRAM(s) Oral every 6 hours PRN  oxyCODONE    IR 5 milliGRAM(s) Oral every 6 hours PRN  pantoprazole    Tablet 40 milliGRAM(s) Oral two times a day      HEALTH ISSUES - PROBLEM Dx:  Transaminitis    Prophylactic measure    Acute pancreatitis           Chief complaint: Patient is a 23y old  Female who presents with a chief complaint of Acute Pancreatitis (27 Dec 2023 08:33)      NATHALY OBRIEN is a 23y year old Female, with a PMHx of known cholelithiasis, hepatic steatosis, gallstone pancreatitis, recent  delivery at 41wks (23), who presented to the ED with epigastric pain. Patient reports intense sharp mid-abd pain that wraps around her abdomen with radiation to the back, with intermittent back pain that radiates to the front, has not trialed meds at home for relief. She states she has never had pain like this before her pregnancy however in the last 2-3 months of pregnancy, she has had ED admissions and visits for gallstone pancreatitis (-, 12/3-). She endorses intermittent n/v with pain, otherwise denies cp, sob, hematemesis, diarrhea, changes in stool caliber, decreased appetite. No family hx of liver disease or colorectal cancers. No smoking/etoh/drug use. No prior EGD. Admitted for acute pancreatitis.     #[id number] Chavo    INTERVAL HPI/OVERNIGHT EVENTS: Patient was complaining of lip swelling overnight. V/S were stable and lung auscultation appreciated no wheezing.  Patient examined at bedside this AM.  Patient reports feeling nauseous after dinner (CLD) but no vomiting. On clear liquid diet. She has an intermittent b/l parietal headache, 2-3/10, pt attributes to hunger. Denies abdominal pain. Last BM yesterday after ERCP. Last  on , last pump on , patient states that she did not have much milk with pumping.     REVIEW OF SYSTEMS:  CONSTITUTIONAL: No fever, weight loss, or fatigue  EYES: No eye pain, visual disturbances, or discharge  ENMT:  +lip swelling; No difficulty hearing, tinnitus, vertigo; No sinus or throat pain  NECK: + pain or stiffness  RESPIRATORY: No cough, wheezing, chills or hemoptysis; No shortness of breath  CARDIOVASCULAR: No chest pain, palpitations, dizziness, or leg swelling  GASTROINTESTINAL: +nauseated after eating; No abdominal or epigastric pain. No vomiting, or hematemesis; No diarrhea or constipation. No melena or hematochezia.  GENITOURINARY: No dysuria, frequency, hematuria, or incontinence  NEUROLOGICAL: + headaches, no memory loss, loss of strength, numbness, or tremors  SKIN: No itching, burning, rashes, or lesions   MUSCULOSKELETAL: No joint pain or swelling; No muscle, back, or extremity pain    FAMILY HISTORY:  No pertinent family history        T(C): 36.9 (23 @ 04:49), Max: 37.2 (23 @ 13:19)  HR: 52 (23 @ 04:49) (48 - 59)  BP: 126/84 (23 @ 04:49) (113/73 - 134/80)  RR: 17 (23 @ 04:49) (12 - 18)  SpO2: 99% (23 @ 04:49) (96% - 100%)  Wt(kg): --Vital Signs Last 24 Hrs  T(C): 36.9 (27 Dec 2023 04:49), Max: 37.2 (26 Dec 2023 13:19)  T(F): 98.4 (27 Dec 2023 04:49), Max: 99 (26 Dec 2023 13:19)  HR: 52 (27 Dec 2023 04:49) (48 - 59)  BP: 126/84 (27 Dec 2023 04:49) (113/73 - 134/80)  BP(mean): --  RR: 17 (27 Dec 2023 04:49) (12 - 18)  SpO2: 99% (27 Dec 2023 04:49) (96% - 100%)    Parameters below as of 27 Dec 2023 04:49  Patient On (Oxygen Delivery Method): room air    PHYSICAL EXAM:  GENERAL: NAD, laying comfortably in bed   HEAD:  Atraumatic, Normocephalic  EYES: EOMI, PERRLA, conjunctiva and sclera clear  ENMT: No tonsillar erythema, exudates, or enlargement; Moist mucous membranes.   NECK: Supple, No JVD, no cervical LAD  NERVOUS SYSTEM:  Alert & Oriented X3, Good concentration  CHEST/LUNG: Clear to percussion bilaterally; No rales, rhonchi, wheezing, or rubs  HEART: Regular rate and rhythm; No murmurs, rubs, or gallops  ABDOMEN: Soft, tenderness to palpation of the lower abdomen, +Garcia's sign, Nondistended; Bowel sounds present  EXTREMITIES: No clubbing, cyanosis, or edema  SKIN: No rashes or lesions, c section scar healing well    Consultant(s) Notes Reviewed:  [x ] YES  [ ] NO  Care Discussed with Consultants/Other Providers [ x] YES  [ ] NO    LABS:                        12.7   8.30  )-----------( 348      ( 27 Dec 2023 06:37 )             36.5           137  |  103  |  11  ----------------------------<  94  3.8   |  27  |  0.74    Ca    9.1      27 Dec 2023 06:37  Phos  4.7       Mg     1.8         TPro  7.1  /  Alb  3.0<L>  /  TBili  0.4  /  DBili  x   /  AST  28  /  ALT  65<H>  /  AlkPhos  262<H>          RADIOLOGY & ADDITIONAL TESTS:    Imaging Personally Reviewed:  [ ] YES  [ ] NO  acetaminophen     Tablet .. 650 milliGRAM(s) Oral every 6 hours  enoxaparin Injectable 40 milliGRAM(s) SubCutaneous every 24 hours  influenza   Vaccine 0.5 milliLiter(s) IntraMuscular once  lactated ringers. 1000 milliLiter(s) IV Continuous <Continuous>  metoclopramide Injectable 10 milliGRAM(s) IV Push once PRN  naloxone Injectable 0.4 milliGRAM(s) IV Push once  ondansetron Injectable 4 milliGRAM(s) IV Push every 8 hours PRN  oxyCODONE    IR 2.5 milliGRAM(s) Oral every 6 hours PRN  oxyCODONE    IR 5 milliGRAM(s) Oral every 6 hours PRN  pantoprazole    Tablet 40 milliGRAM(s) Oral two times a day      HEALTH ISSUES - PROBLEM Dx:  Transaminitis    Prophylactic measure    Acute pancreatitis           Chief complaint: Patient is a 23y old  Female who presents with a chief complaint of Acute Pancreatitis (27 Dec 2023 08:33)      NATHALY OBRIEN is a 23y year old Female, with a PMHx of known cholelithiasis, hepatic steatosis, gallstone pancreatitis, recent  delivery at 41wks (23), who presented to the ED with epigastric pain. Patient reports intense sharp mid-abd pain that wraps around her abdomen with radiation to the back, with intermittent back pain that radiates to the front, has not trialed meds at home for relief. She states she has never had pain like this before her pregnancy however in the last 2-3 months of pregnancy, she has had ED admissions and visits for gallstone pancreatitis (-, 12/3-). She endorses intermittent n/v with pain, otherwise denies cp, sob, hematemesis, diarrhea, changes in stool caliber, decreased appetite. No family hx of liver disease or colorectal cancers. No smoking/etoh/drug use. No prior EGD. Admitted for acute pancreatitis.     #[id number] Chavo    INTERVAL HPI/OVERNIGHT EVENTS: Patient was complaining of lip swelling overnight. V/S were stable and lung auscultation appreciated no wheezing.  Patient examined at bedside this AM.  Patient reports feeling nauseous after dinner (CLD) but no vomiting. On clear liquid diet. She has an intermittent b/l parietal headache, 2-3/10, pt attributes to hunger. Denies abdominal pain. Last BM yesterday after ERCP. Last  on , last pump on , patient states that she did not have much milk with pumping.     REVIEW OF SYSTEMS:  CONSTITUTIONAL: No fever, weight loss, or fatigue  EYES: No eye pain, visual disturbances, or discharge  ENMT:  +lip swelling; No difficulty hearing, tinnitus, vertigo; No sinus or throat pain  NECK: + pain or stiffness  RESPIRATORY: No cough, wheezing, chills or hemoptysis; No shortness of breath  CARDIOVASCULAR: No chest pain, palpitations, dizziness, or leg swelling  GASTROINTESTINAL: +nauseated after eating; No abdominal or epigastric pain. No vomiting, or hematemesis; No diarrhea or constipation. No melena or hematochezia.  GENITOURINARY: No dysuria, frequency, hematuria, or incontinence  NEUROLOGICAL: + headaches, no memory loss, loss of strength, numbness, or tremors  SKIN: No itching, burning, rashes, or lesions   MUSCULOSKELETAL: No joint pain or swelling; No muscle, back, or extremity pain    FAMILY HISTORY:  No pertinent family history        T(C): 36.9 (23 @ 04:49), Max: 37.2 (23 @ 13:19)  HR: 52 (23 @ 04:49) (48 - 59)  BP: 126/84 (23 @ 04:49) (113/73 - 134/80)  RR: 17 (23 @ 04:49) (12 - 18)  SpO2: 99% (23 @ 04:49) (96% - 100%)  Wt(kg): --Vital Signs Last 24 Hrs  T(C): 36.9 (27 Dec 2023 04:49), Max: 37.2 (26 Dec 2023 13:19)  T(F): 98.4 (27 Dec 2023 04:49), Max: 99 (26 Dec 2023 13:19)  HR: 52 (27 Dec 2023 04:49) (48 - 59)  BP: 126/84 (27 Dec 2023 04:49) (113/73 - 134/80)  BP(mean): --  RR: 17 (27 Dec 2023 04:49) (12 - 18)  SpO2: 99% (27 Dec 2023 04:49) (96% - 100%)    Parameters below as of 27 Dec 2023 04:49  Patient On (Oxygen Delivery Method): room air    PHYSICAL EXAM:  GENERAL: NAD, laying comfortably in bed   HEAD:  Atraumatic, Normocephalic  EYES: EOMI, PERRLA, conjunctiva and sclera clear  ENMT: No tonsillar erythema, exudates, or enlargement; Moist mucous membranes.   NECK: Supple, No JVD, no cervical LAD  NERVOUS SYSTEM:  Alert & Oriented X3, Good concentration  CHEST/LUNG: Clear to percussion bilaterally; No rales, rhonchi, wheezing, or rubs  HEART: Regular rate and rhythm; No murmurs, rubs, or gallops  ABDOMEN: Soft, tenderness to palpation of the lower abdomen, +Garcia's sign, Nondistended; Bowel sounds present  EXTREMITIES: No clubbing, cyanosis, or edema  SKIN: No rashes or lesions, c section scar healing well    Consultant(s) Notes Reviewed:  [x ] YES  [ ] NO  Care Discussed with Consultants/Other Providers [ x] YES  [ ] NO    LABS:                        12.7   8.30  )-----------( 348      ( 27 Dec 2023 06:37 )             36.5           137  |  103  |  11  ----------------------------<  94  3.8   |  27  |  0.74    Ca    9.1      27 Dec 2023 06:37  Phos  4.7       Mg     1.8         TPro  7.1  /  Alb  3.0<L>  /  TBili  0.4  /  DBili  x   /  AST  28  /  ALT  65<H>  /  AlkPhos  262<H>          RADIOLOGY & ADDITIONAL TESTS:    Imaging Personally Reviewed:  [ ] YES  [ ] NO  acetaminophen     Tablet .. 650 milliGRAM(s) Oral every 6 hours  enoxaparin Injectable 40 milliGRAM(s) SubCutaneous every 24 hours  influenza   Vaccine 0.5 milliLiter(s) IntraMuscular once  lactated ringers. 1000 milliLiter(s) IV Continuous <Continuous>  metoclopramide Injectable 10 milliGRAM(s) IV Push once PRN  naloxone Injectable 0.4 milliGRAM(s) IV Push once  ondansetron Injectable 4 milliGRAM(s) IV Push every 8 hours PRN  oxyCODONE    IR 2.5 milliGRAM(s) Oral every 6 hours PRN  oxyCODONE    IR 5 milliGRAM(s) Oral every 6 hours PRN  pantoprazole    Tablet 40 milliGRAM(s) Oral two times a day      HEALTH ISSUES - PROBLEM Dx:  Transaminitis    Prophylactic measure    Acute pancreatitis

## 2023-12-27 NOTE — PROGRESS NOTE ADULT - PROBLEM SELECTOR PLAN 2
CT ab/pelvis - Cholelithiasis  Consulted surgery CT ab/pelvis - Cholelithiasis  Consulted surgery - Recommend cholecystectomy to prevent recurrence in 1 month due to recent   Follow-up with surgeon Dr. Flakito campos CT ab/pelvis - Cholelithiasis  Consulted surgery - Recommend cholecystectomy to prevent recurrence in 1 month due to recent   Follow-up with surgeon Dr. Flakito acmpos

## 2023-12-27 NOTE — DISCHARGE NOTE NURSING/CASE MANAGEMENT/SOCIAL WORK - PATIENT PORTAL LINK FT
You can access the FollowMyHealth Patient Portal offered by [facility] by registering at the following website: [email or web address]. By joining [employer]’s FollowMyHealth portal, you will also be able to view your health information using other applications (apps) compatible with our system. You can access the FollowMyHealth Patient Portal offered by [facility] by registering at the following website: [email or web address]. By joining [employer]’s FollowMyHealth portal, you will also be able to view your health information using other applications (apps) compatible with our system.

## 2023-12-27 NOTE — CONSULT NOTE ADULT - SUBJECTIVE AND OBJECTIVE BOX
23 year old Estonian-speaking female ( Wolfgang #[id number]) with PMHx of cholelithiasis, hepatic steatosis, acute pancreatitis and PSHx of  (2023) s/p  POD#7 admitted for acute pancreatitis. Patient presented with acute mid-abdominal pain radiating to back, which has since self-resolved. Sx onset 2 days ago; denies fever, chills, nausea, vomiting and other pertinent symptoms. Patient states being hospitalized for gallstone pancreatitis twice in the last 2-3 months of her pregnancy (- and -); states having similar symptoms with associated nausea and vomiting. Patient is on clear liquid diet, tolerating well. Reports passing flatus, voiding well, and having regular bowel movements. Patient feeling well after ERCP yesterday, no acute complaints. Patient is open to having cholecystectomy and would like to have it as soon as possible as she is a single mother with only 2 months of maternity leave.      REVIEW OF SYSTEMS:  CONSTITUTIONAL: No fever, weight loss, or fatigue  RESPIRATORY: No cough, wheezing, chills or hemoptysis; No shortness of breath  CARDIOVASCULAR: No chest pain, palpitations, dizziness, or leg swelling  GASTROINTESTINAL: (+) abdominal pain. No nausea, vomiting, or hematemesis; No diarrhea or constipation. No melena or hematochezia.  GENITOURINARY: No dysuria or hematuria, urinary frequency  NEUROLOGICAL: No headaches, memory loss, loss of strength, numbness, or tremors  SKIN: No itching, burning, rashes, or lesions     MEDICATIONS  (STANDING):  acetaminophen     Tablet .. 650 milliGRAM(s) Oral every 6 hours  enoxaparin Injectable 40 milliGRAM(s) SubCutaneous every 24 hours  influenza   Vaccine 0.5 milliLiter(s) IntraMuscular once  lactated ringers. 1000 milliLiter(s) (200 mL/Hr) IV Continuous <Continuous>  naloxone Injectable 0.4 milliGRAM(s) IV Push once  pantoprazole    Tablet 40 milliGRAM(s) Oral two times a day    MEDICATIONS  (PRN):  metoclopramide Injectable 10 milliGRAM(s) IV Push once PRN Nausea and/or Vomiting  ondansetron Injectable 4 milliGRAM(s) IV Push every 8 hours PRN Nausea and/or Vomiting  oxyCODONE    IR 5 milliGRAM(s) Oral every 6 hours PRN Severe Pain (7 - 10)  oxyCODONE    IR 2.5 milliGRAM(s) Oral every 6 hours PRN Moderate Pain (4 - 6)      Vital Signs Last 24 Hrs  T(C): 36.9 (27 Dec 2023 04:49), Max: 37.2 (26 Dec 2023 13:19)  T(F): 98.4 (27 Dec 2023 04:49), Max: 99 (26 Dec 2023 13:19)  HR: 52 (27 Dec 2023 04:49) (48 - 59)  BP: 126/84 (27 Dec 2023 04:49) (113/73 - 134/80)  BP(mean): --  RR: 17 (27 Dec 2023 04:49) (12 - 18)  SpO2: 99% (27 Dec 2023 04:49) (96% - 100%)    Parameters below as of 27 Dec 2023 04:49  Patient On (Oxygen Delivery Method): room air        PHYSICAL EXAMINATION:  GENERAL: NAD, lying comfortably; non-toxic appearing  HEAD:  Atraumatic, Normocephalic  EYES:  conjunctiva and sclera clear  NECK: Supple  CHEST/LUNG: breathing unlabored. Clear to auscultation bilaterally. No rales, rhonchi, wheezing, or rubs  HEART: Regular rate and rhythm; No murmurs, rubs, or gallops  ABDOMEN: incision s/p  healing appropriately, no erythema, blistering, or other abnormalities on inspection. Soft, NT/ND. Bowel sounds present.   NERVOUS SYSTEM:  Alert & Oriented X3  : voiding well. No Torres catheter.  EXTREMITIES:  2+ Peripheral Pulses, No clubbing, cyanosis, or edema  SKIN: warm dry                          12.7   8.30  )-----------( 348      ( 27 Dec 2023 06:37 )             36.5         137  |  103  |  11  ----------------------------<  94  3.8   |  27  |  0.74    Ca    9.1      27 Dec 2023 06:37  Phos  4.7       Mg     1.8         TPro  7.1  /  Alb  3.0<L>  /  TBili  0.4  /  DBili  x   /  AST  28  /  ALT  65<H>  /  AlkPhos  262<H>  12-27    LIVER FUNCTIONS - ( 27 Dec 2023 06:37 )  Alb: 3.0 g/dL / Pro: 7.1 g/dL / ALK PHOS: 262 U/L / ALT: 65 U/L DA / AST: 28 U/L / GGT: x             Lipase: >5000: New Lipase reference range as of 2023  New Ranges: 13-75 U/L  Old Range:  U/L U/L (23 @ 20:00)    Triglycerides, Serum (23 @ 01:25)    Triglycerides, Serum: 187 mg/dL    Sedimentation Rate, Erythrocyte (23 @ 00:20)    Sedimentation Rate, Erythrocyte: 30 mm/Hr      RADIOLOGY & ADDITIONAL TESTS:  < from: CT Abdomen and Pelvis w/ IV Cont (23 @ 21:33) >  INTERPRETATION:  CLINICAL INDICATION: upper abdominal pain, hx of   gallstones, s/p     PROCEDURE:  Helical axial images were obtained from the domes of the diaphragm   through the pubic symphysis following the administration of intravenous   contrast. Coronal and sagittal reformats were also obtained.    CONTRAST/COMPLICATIONS:  IV Contrast: Omnipaque 350  90 cc administered   10 cc discarded  Oral Contrast: NONE  Complications: None reported at time of study completion    COMPARISON: 12/15/2023.    FINDINGS:    LOWER CHEST: Atelectasis.    LIVER: Unremarkable.  BILE DUCTS/GALLBLADDER: Prominent central intrahepatic biliary ducts.   Common bile duct measuring up to 0.8 cm. Cholelithiasis.  PANCREAS: Mildly enlarged pancreas with peripancreatic stranding/free   fluid. Hypodense area at the pancreatic body (2:40), which corresponds to   fat attenuation on thecoronal image.  SPLEEN: Unremarkable.    ADRENALS: Unremarkable.  KIDNEYS/URETERS: Prominent bilateral renal collecting system without   hydronephrosis. Question striated bilateral nephrogram.  BLADDER: Partially distended.  REPRODUCTIVE ORGANS: Enlarged, heterogeneous uterus with contour   deformity, reflecting recent postsurgical/postpartum changes. Nonspecific   complex fluid and foci of air at the endometrial cavity. Small fluid at   the vaginal canal.    BOWEL: No bowel obstruction. Unremarkable appendix.  PERITONEUM: No organized fluid collection or free air.  VESSELS: Normal caliber of the abdominal aorta.  RETROPERITONEUM/LYMPH NODE: No lymphadenopathy.  ABDOMINAL WALL/SOFT TISSUES: Small fat-containing umbilical hernia.   Stranding and foci of air along the anterior pelvic wall soft tissue,   reflecting postsurgical changes.  BONES: Degenerative changes of the spine.    IMPRESSION:    Cholelithiasis. Acute pancreatitis. Small peripancreatic free fluid. No   organized peripancreatic fluid collection.    Prominent central intrahepatic biliary ducts. Common bile ductal   dilatation. Recommend clinical correlation and additional imaging   (MRCP/MR) for evaluation of biliary pathology (choledocholithiasis).    Prominent bilateral renal collecting system without hydronephrosis.   Question striated bilateral nephrogram. Recommend clinical correlation to   assess urinary tract infection (cystitis/pyelonephritis).    Nonspecific complex fluid and foci of air at the endometrial cavity,   reflecting recent postsurgical changes. Recommend clinical correlation to   assess infection/inflammation (endometritis).    Additional findings as described.    --- End of Report ---    < end of copied text >        < from: ERCP w/ EUS (23 @ 15:00) >  ERCP Findings:         film was obtained which was normal. The duodenoscope was advanced into the    second portion of the duodenum. The major papilla was identified which was    mildly erythematous but otherwise normal. A 0.035 inch guidewire was passed into    the biliary tree. The bile duct was deeply cannulated using a Hydratome    sphincterotome. Contrast was injected. There were a few filling defects in the    distal bile duct consistent with sludge. The bile duct was not dilated. The    cystic duct and gallbladder filled. Biliary sphincterotomy was performed using    ERBE cautery. The biliary tree was swept using a 9-12 mm biliary extraction    balloon. Sludge was removed from the bile duct. Final balloon occlusion    cholangiogram showed no residual filling defects. There was mild post    sphincterotomy oozing from the lateral margin of the sphincterotomy site which    was persistent despite balloon tamponade. To facilitate drainage, one 10 Fr by 5    cm plastic biliary stent with one internal flange and one external flange    ([employer]) was placed into the common bile duct. The stent was in good position.    There was excellent drainage of bile and contrast at conclusion. There was no    bleeding after stent placement at conclusion.                EUS Findings:        EGD Findings:        Esophagus: Normal.        Stomach: One 5 mm clean based ulcer in the gastric antrum with multiple    scattered erosions and erythema noted throughout. Biopsies taken with a cold    forceps from the antrum, body, and incisura for histology.        Duodenum: Normal.        EUS Findings:        Biliary tree: There were extensive small hyperechoic, shadowing lesions along    with hyperechoic material in the gallbladder body and neck consistent with    stones. The common bile duct was not dilated but appeared to contain a small    amount of hyperechoic dependent material suspicious for sludge/stone debris.        Ampulla: Normal.        Pancreas: Mild lobularity and hypoechogenicity diffusely consistent with    pancreatitis changes. No pancreatic ductal dilation.        Liver: Normal visualized portions of the left lobe.        Lymph nodes: No lymphadenopathy seen.                Summary:        Biliary sludge. Ductal clearance with biliary sphincterotomy and balloon    extraction. Plastic biliary stent placed to facilitate drainage. One clean based    antral ulcer and erosive gastropathy on preceding EGD exam, biopsied.        Plan:        Return to floor for further management        Advance diet as tolerated        Await pathology        Avoid NSAIDs (Ibuprofen, Motrin, Aleve, Naproxen, Toradol, etc) for 4 weeks        Use Omeprazole 40 mg orally twice daily        Repeat EGD to confirm ulcer healing and ERCP for stent removal in 2-3 months        Surgery consultation for cholecystectomy to preventrecurrence    < end of copied text >             23 year old [place] sor[place]-speaking female ( Wolfgang #[id number]) with PMHx of cholelithiasis, hepatic steatosis, acute pancreatitis and PSHx of  (2023) s/p  POD#7 admitted for acute pancreatitis. Patient presented with acute mid-abdominal pain radiating to back, which has since self-resolved. Sx onset 2 days ago; denies fever, chills, nausea, vomiting and other pertinent symptoms. Patient states being hospitalized for gallstone pancreatitis twice in the last 2-3 months of her pregnancy (- and -); states having similar symptoms with associated nausea and vomiting. Patient is on clear liquid diet, tolerating well. Reports passing flatus, voiding well, and having regular bowel movements. Patient feeling well after ERCP yesterday, no acute complaints. Patient is open to having cholecystectomy and would like to have it as soon as possible as she is a single mother with only 2 months of maternity leave.      REVIEW OF SYSTEMS:  CONSTITUTIONAL: No fever, weight loss, or fatigue  RESPIRATORY: No cough, wheezing, chills or hemoptysis; No shortness of breath  CARDIOVASCULAR: No chest pain, palpitations, dizziness, or leg swelling  GASTROINTESTINAL: (+) abdominal pain. No nausea, vomiting, or hematemesis; No diarrhea or constipation. No melena or hematochezia.  GENITOURINARY: No dysuria or hematuria, urinary frequency  NEUROLOGICAL: No headaches, memory loss, loss of strength, numbness, or tremors  SKIN: No itching, burning, rashes, or lesions     MEDICATIONS  (STANDING):  acetaminophen     Tablet .. 650 milliGRAM(s) Oral every 6 hours  enoxaparin Injectable 40 milliGRAM(s) SubCutaneous every 24 hours  influenza   Vaccine 0.5 milliLiter(s) IntraMuscular once  lactated ringers. 1000 milliLiter(s) (200 mL/Hr) IV Continuous <Continuous>  naloxone Injectable 0.4 milliGRAM(s) IV Push once  pantoprazole    Tablet 40 milliGRAM(s) Oral two times a day    MEDICATIONS  (PRN):  metoclopramide Injectable 10 milliGRAM(s) IV Push once PRN Nausea and/or Vomiting  ondansetron Injectable 4 milliGRAM(s) IV Push every 8 hours PRN Nausea and/or Vomiting  oxyCODONE    IR 5 milliGRAM(s) Oral every 6 hours PRN Severe Pain (7 - 10)  oxyCODONE    IR 2.5 milliGRAM(s) Oral every 6 hours PRN Moderate Pain (4 - 6)      Vital Signs Last 24 Hrs  T(C): 36.9 (27 Dec 2023 04:49), Max: 37.2 (26 Dec 2023 13:19)  T(F): 98.4 (27 Dec 2023 04:49), Max: 99 (26 Dec 2023 13:19)  HR: 52 (27 Dec 2023 04:49) (48 - 59)  BP: 126/84 (27 Dec 2023 04:49) (113/73 - 134/80)  BP(mean): --  RR: 17 (27 Dec 2023 04:49) (12 - 18)  SpO2: 99% (27 Dec 2023 04:49) (96% - 100%)    Parameters below as of 27 Dec 2023 04:49  Patient On (Oxygen Delivery Method): room air        PHYSICAL EXAMINATION:  GENERAL: NAD, lying comfortably; non-toxic appearing  HEAD:  Atraumatic, Normocephalic  EYES:  conjunctiva and sclera clear  NECK: Supple  CHEST/LUNG: breathing unlabored. Clear to auscultation bilaterally. No rales, rhonchi, wheezing, or rubs  HEART: Regular rate and rhythm; No murmurs, rubs, or gallops  ABDOMEN: incision s/p  healing appropriately, no erythema, blistering, or other abnormalities on inspection. Soft, NT/ND. Bowel sounds present.   NERVOUS SYSTEM:  Alert & Oriented X3  : voiding well. No Torres catheter.  EXTREMITIES:  2+ Peripheral Pulses, No clubbing, cyanosis, or edema  SKIN: warm dry                          12.7   8.30  )-----------( 348      ( 27 Dec 2023 06:37 )             36.5         137  |  103  |  11  ----------------------------<  94  3.8   |  27  |  0.74    Ca    9.1      27 Dec 2023 06:37  Phos  4.7       Mg     1.8         TPro  7.1  /  Alb  3.0<L>  /  TBili  0.4  /  DBili  x   /  AST  28  /  ALT  65<H>  /  AlkPhos  262<H>  12-27    LIVER FUNCTIONS - ( 27 Dec 2023 06:37 )  Alb: 3.0 g/dL / Pro: 7.1 g/dL / ALK PHOS: 262 U/L / ALT: 65 U/L DA / AST: 28 U/L / GGT: x             Lipase: >5000: New Lipase reference range as of 2023  New Ranges: 13-75 U/L  Old Range:  U/L U/L (23 @ 20:00)    Triglycerides, Serum (23 @ 01:25)    Triglycerides, Serum: 187 mg/dL    Sedimentation Rate, Erythrocyte (23 @ 00:20)    Sedimentation Rate, Erythrocyte: 30 mm/Hr      RADIOLOGY & ADDITIONAL TESTS:  < from: CT Abdomen and Pelvis w/ IV Cont (23 @ 21:33) >  INTERPRETATION:  CLINICAL INDICATION: upper abdominal pain, hx of   gallstones, s/p     PROCEDURE:  Helical axial images were obtained from the domes of the diaphragm   through the pubic symphysis following the administration of intravenous   contrast. Coronal and sagittal reformats were also obtained.    CONTRAST/COMPLICATIONS:  IV Contrast: Omnipaque 350  90 cc administered   10 cc discarded  Oral Contrast: NONE  Complications: None reported at time of study completion    COMPARISON: 12/15/2023.    FINDINGS:    LOWER CHEST: Atelectasis.    LIVER: Unremarkable.  BILE DUCTS/GALLBLADDER: Prominent central intrahepatic biliary ducts.   Common bile duct measuring up to 0.8 cm. Cholelithiasis.  PANCREAS: Mildly enlarged pancreas with peripancreatic stranding/free   fluid. Hypodense area at the pancreatic body (2:40), which corresponds to   fat attenuation on thecoronal image.  SPLEEN: Unremarkable.    ADRENALS: Unremarkable.  KIDNEYS/URETERS: Prominent bilateral renal collecting system without   hydronephrosis. Question striated bilateral nephrogram.  BLADDER: Partially distended.  REPRODUCTIVE ORGANS: Enlarged, heterogeneous uterus with contour   deformity, reflecting recent postsurgical/postpartum changes. Nonspecific   complex fluid and foci of air at the endometrial cavity. Small fluid at   the vaginal canal.    BOWEL: No bowel obstruction. Unremarkable appendix.  PERITONEUM: No organized fluid collection or free air.  VESSELS: Normal caliber of the abdominal aorta.  RETROPERITONEUM/LYMPH NODE: No lymphadenopathy.  ABDOMINAL WALL/SOFT TISSUES: Small fat-containing umbilical hernia.   Stranding and foci of air along the anterior pelvic wall soft tissue,   reflecting postsurgical changes.  BONES: Degenerative changes of the spine.    IMPRESSION:    Cholelithiasis. Acute pancreatitis. Small peripancreatic free fluid. No   organized peripancreatic fluid collection.    Prominent central intrahepatic biliary ducts. Common bile ductal   dilatation. Recommend clinical correlation and additional imaging   (MRCP/MR) for evaluation of biliary pathology (choledocholithiasis).    Prominent bilateral renal collecting system without hydronephrosis.   Question striated bilateral nephrogram. Recommend clinical correlation to   assess urinary tract infection (cystitis/pyelonephritis).    Nonspecific complex fluid and foci of air at the endometrial cavity,   reflecting recent postsurgical changes. Recommend clinical correlation to   assess infection/inflammation (endometritis).    Additional findings as described.    --- End of Report ---    < end of copied text >        < from: ERCP w/ EUS (23 @ 15:00) >  ERCP Findings:         film was obtained which was normal. The duodenoscope was advanced into the    second portion of the duodenum. The major papilla was identified which was    mildly erythematous but otherwise normal. A 0.035 inch guidewire was passed into    the biliary tree. The bile duct was deeply cannulated using a Hydratome    sphincterotome. Contrast was injected. There were a few filling defects in the    distal bile duct consistent with sludge. The bile duct was not dilated. The    cystic duct and gallbladder filled. Biliary sphincterotomy was performed using    ERBE cautery. The biliary tree was swept using a 9-12 mm biliary extraction    balloon. Sludge was removed from the bile duct. Final balloon occlusion    cholangiogram showed no residual filling defects. There was mild post    sphincterotomy oozing from the lateral margin of the sphincterotomy site which    was persistent despite balloon tamponade. To facilitate drainage, one 10 Fr by 5    cm plastic biliary stent with one internal flange and one external flange    ([employer]) was placed into the common bile duct. The stent was in good position.    There was excellent drainage of bile and contrast at conclusion. There was no    bleeding after stent placement at conclusion.                EUS Findings:        EGD Findings:        Esophagus: Normal.        Stomach: One 5 mm clean based ulcer in the gastric antrum with multiple    scattered erosions and erythema noted throughout. Biopsies taken with a cold    forceps from the antrum, body, and incisura for histology.        Duodenum: Normal.        EUS Findings:        Biliary tree: There were extensive small hyperechoic, shadowing lesions along    with hyperechoic material in the gallbladder body and neck consistent with    stones. The common bile duct was not dilated but appeared to contain a small    amount of hyperechoic dependent material suspicious for sludge/stone debris.        Ampulla: Normal.        Pancreas: Mild lobularity and hypoechogenicity diffusely consistent with    pancreatitis changes. No pancreatic ductal dilation.        Liver: Normal visualized portions of the left lobe.        Lymph nodes: No lymphadenopathy seen.                Summary:        Biliary sludge. Ductal clearance with biliary sphincterotomy and balloon    extraction. Plastic biliary stent placed to facilitate drainage. One clean based    antral ulcer and erosive gastropathy on preceding EGD exam, biopsied.        Plan:        Return to floor for further management        Advance diet as tolerated        Await pathology        Avoid NSAIDs (Ibuprofen, Motrin, Aleve, Naproxen, Toradol, etc) for 4 weeks        Use Omeprazole 40 mg orally twice daily        Repeat EGD to confirm ulcer healing and ERCP for stent removal in 2-3 months        Surgery consultation for cholecystectomy to preventrecurrence    < end of copied text >             23 year old [place]ian-speaking female ( Wolfgang #[id number]) with PMHx of cholelithiasis, hepatic steatosis, acute pancreatitis and PSHx of  (2023) s/p  POD#7 admitted for acute pancreatitis. Patient presented with acute mid-abdominal pain radiating to back, which has since self-resolved. Sx onset 2 days ago; denies fever, chills, nausea, vomiting and other pertinent symptoms. Patient states being hospitalized for gallstone pancreatitis twice in the last 2-3 months of her pregnancy (- and -); states having similar symptoms with associated nausea and vomiting. Patient is on clear liquid diet, tolerating well. Reports passing flatus, voiding well, and having regular bowel movements. Patient s/p EGD/EUS/ERCP yesterday with removal of sludge & plastic biliary stent placement; feeling well after procedure. Otherwise, no acute complaints. Patient willing to have cholecystectomy and would like to have it as soon as possible as she is a single mother with only 2 months of maternity leave.      REVIEW OF SYSTEMS:  CONSTITUTIONAL: No fever, weight loss, or fatigue  RESPIRATORY: No cough, wheezing, chills or hemoptysis; No shortness of breath  CARDIOVASCULAR: No chest pain, palpitations, dizziness, or leg swelling  GASTROINTESTINAL: (+) abdominal pain. No nausea, vomiting, or hematemesis; No diarrhea or constipation. No melena or hematochezia.  GENITOURINARY: No dysuria or hematuria, urinary frequency  NEUROLOGICAL: No headaches, memory loss, loss of strength, numbness, or tremors  SKIN: No itching, burning, rashes, or lesions     MEDICATIONS  (STANDING):  acetaminophen     Tablet .. 650 milliGRAM(s) Oral every 6 hours  enoxaparin Injectable 40 milliGRAM(s) SubCutaneous every 24 hours  influenza   Vaccine 0.5 milliLiter(s) IntraMuscular once  lactated ringers. 1000 milliLiter(s) (200 mL/Hr) IV Continuous <Continuous>  naloxone Injectable 0.4 milliGRAM(s) IV Push once  pantoprazole    Tablet 40 milliGRAM(s) Oral two times a day    MEDICATIONS  (PRN):  metoclopramide Injectable 10 milliGRAM(s) IV Push once PRN Nausea and/or Vomiting  ondansetron Injectable 4 milliGRAM(s) IV Push every 8 hours PRN Nausea and/or Vomiting  oxyCODONE    IR 5 milliGRAM(s) Oral every 6 hours PRN Severe Pain (7 - 10)  oxyCODONE    IR 2.5 milliGRAM(s) Oral every 6 hours PRN Moderate Pain (4 - 6)      Vital Signs Last 24 Hrs  T(C): 36.9 (27 Dec 2023 04:49), Max: 37.2 (26 Dec 2023 13:19)  T(F): 98.4 (27 Dec 2023 04:49), Max: 99 (26 Dec 2023 13:19)  HR: 52 (27 Dec 2023 04:49) (48 - 59)  BP: 126/84 (27 Dec 2023 04:49) (113/73 - 134/80)  BP(mean): --  RR: 17 (27 Dec 2023 04:49) (12 - 18)  SpO2: 99% (27 Dec 2023 04:49) (96% - 100%)    Parameters below as of 27 Dec 2023 04:49  Patient On (Oxygen Delivery Method): room air        PHYSICAL EXAMINATION:  GENERAL: NAD, lying comfortably; non-toxic appearing  HEAD:  Atraumatic, Normocephalic  EYES:  conjunctiva and sclera clear  NECK: Supple  CHEST/LUNG: breathing unlabored. Clear to auscultation bilaterally. No rales, rhonchi, wheezing, or rubs  HEART: Regular rate and rhythm; No murmurs, rubs, or gallops  ABDOMEN: incision s/p  healing appropriately, no erythema, blistering, or other abnormalities on inspection. Soft, NT/ND. Bowel sounds present.   NERVOUS SYSTEM:  Alert & Oriented X3  : voiding well. No Torres catheter.  EXTREMITIES:  2+ Peripheral Pulses, No clubbing, cyanosis, or edema  SKIN: warm dry                          12.7   8.30  )-----------( 348      ( 27 Dec 2023 06:37 )             36.5         137  |  103  |  11  ----------------------------<  94  3.8   |  27  |  0.74    Ca    9.1      27 Dec 2023 06:37  Phos  4.7       Mg     1.8     12-27    TPro  7.1  /  Alb  3.0<L>  /  TBili  0.4  /  DBili  x   /  AST  28  /  ALT  65<H>  /  AlkPhos  262<H>  12-    LIVER FUNCTIONS - ( 27 Dec 2023 06:37 )  Alb: 3.0 g/dL / Pro: 7.1 g/dL / ALK PHOS: 262 U/L / ALT: 65 U/L DA / AST: 28 U/L / GGT: x             Lipase: >5000: New Lipase reference range as of 2023  New Ranges: 13-75 U/L  Old Range:  U/L U/L (. @ 20:00)    Triglycerides, Serum (. @ 01:25)    Triglycerides, Serum: 187 mg/dL    Sedimentation Rate, Erythrocyte (23 @ 00:20)    Sedimentation Rate, Erythrocyte: 30 mm/Hr      RADIOLOGY & ADDITIONAL TESTS:  < from: CT Abdomen and Pelvis w/ IV Cont (23 @ 21:33) >  INTERPRETATION:  CLINICAL INDICATION: upper abdominal pain, hx of   gallstones, s/p     PROCEDURE:  Helical axial images were obtained from the domes of the diaphragm   through the pubic symphysis following the administration of intravenous   contrast. Coronal and sagittal reformats were also obtained.    CONTRAST/COMPLICATIONS:  IV Contrast: Omnipaque 350  90 cc administered   10 cc discarded  Oral Contrast: NONE  Complications: None reported at time of study completion    COMPARISON: 12/15/2023.    FINDINGS:    LOWER CHEST: Atelectasis.    LIVER: Unremarkable.  BILE DUCTS/GALLBLADDER: Prominent central intrahepatic biliary ducts.   Common bile duct measuring up to 0.8 cm. Cholelithiasis.  PANCREAS: Mildly enlarged pancreas with peripancreatic stranding/free   fluid. Hypodense area at the pancreatic body (2:40), which corresponds to   fat attenuation on thecoronal image.  SPLEEN: Unremarkable.    ADRENALS: Unremarkable.  KIDNEYS/URETERS: Prominent bilateral renal collecting system without   hydronephrosis. Question striated bilateral nephrogram.  BLADDER: Partially distended.  REPRODUCTIVE ORGANS: Enlarged, heterogeneous uterus with contour   deformity, reflecting recent postsurgical/postpartum changes. Nonspecific   complex fluid and foci of air at the endometrial cavity. Small fluid at   the vaginal canal.    BOWEL: No bowel obstruction. Unremarkable appendix.  PERITONEUM: No organized fluid collection or free air.  VESSELS: Normal caliber of the abdominal aorta.  RETROPERITONEUM/LYMPH NODE: No lymphadenopathy.  ABDOMINAL WALL/SOFT TISSUES: Small fat-containing umbilical hernia.   Stranding and foci of air along the anterior pelvic wall soft tissue,   reflecting postsurgical changes.  BONES: Degenerative changes of the spine.    IMPRESSION:    Cholelithiasis. Acute pancreatitis. Small peripancreatic free fluid. No   organized peripancreatic fluid collection.    Prominent central intrahepatic biliary ducts. Common bile ductal   dilatation. Recommend clinical correlation and additional imaging   (MRCP/MR) for evaluation of biliary pathology (choledocholithiasis).    Prominent bilateral renal collecting system without hydronephrosis.   Question striated bilateral nephrogram. Recommend clinical correlation to   assess urinary tract infection (cystitis/pyelonephritis).    Nonspecific complex fluid and foci of air at the endometrial cavity,   reflecting recent postsurgical changes. Recommend clinical correlation to   assess infection/inflammation (endometritis).    Additional findings as described.    --- End of Report ---    < end of copied text >        < from: ERCP w/ EUS (23 @ 15:00) >  ERCP Findings:         film was obtained which was normal. The duodenoscope was advanced into the    second portion of the duodenum. The major papilla was identified which was    mildly erythematous but otherwise normal. A 0.035 inch guidewire was passed into    the biliary tree. The bile duct was deeply cannulated using a Hydratome    sphincterotome. Contrast was injected. There were a few filling defects in the    distal bile duct consistent with sludge. The bile duct was not dilated. The    cystic duct and gallbladder filled. Biliary sphincterotomy was performed using    ERBE cautery. The biliary tree was swept using a 9-12 mm biliary extraction    balloon. Sludge was removed from the bile duct. Final balloon occlusion    cholangiogram showed no residual filling defects. There was mild post    sphincterotomy oozing from the lateral margin of the sphincterotomy site which    was persistent despite balloon tamponade. To facilitate drainage, one 10 Fr by 5    cm plastic biliary stent with one internal flange and one external flange    (Advanix) was placed into the common bile duct. The stent was in good position.    There was excellent drainage of bile and contrast at conclusion. There was no    bleeding after stent placement at conclusion.                EUS Findings:        EGD Findings:        Esophagus: Normal.        Stomach: One 5 mm clean based ulcer in the gastric antrum with multiple    scattered erosions and erythema noted throughout. Biopsies taken with a cold    forceps from the antrum, body, and incisura for histology.        Duodenum: Normal.        EUS Findings:        Biliary tree: There were extensive small hyperechoic, shadowing lesions along    with hyperechoic material in the gallbladder body and neck consistent with    stones. The common bile duct was not dilated but appeared to contain a small    amount of hyperechoic dependent material suspicious for sludge/stone debris.        Ampulla: Normal.        Pancreas: Mild lobularity and hypoechogenicity diffusely consistent with    pancreatitis changes. No pancreatic ductal dilation.        Liver: Normal visualized portions of the left lobe.        Lymph nodes: No lymphadenopathy seen.                Summary:        Biliary sludge. Ductal clearance with biliary sphincterotomy and balloon    extraction. Plastic biliary stent placed to facilitate drainage. One clean based    antral ulcer and erosive gastropathy on preceding EGD exam, biopsied.        Plan:        Return to floor for further management        Advance diet as tolerated        Await pathology        Avoid NSAIDs (Ibuprofen, Motrin, Aleve, Naproxen, Toradol, etc) for 4 weeks        Use Omeprazole 40 mg orally twice daily        Repeat EGD to confirm ulcer healing and ERCP for stent removal in 2-3 months        Surgery consultation for cholecystectomy to preventrecurrence    < end of copied text >             23 year old Polish-speaking female ( Wolfgang #[id number]) with PMHx of cholelithiasis, hepatic steatosis, acute pancreatitis and PSHx of  (2023) s/p  POD#7 admitted for acute pancreatitis. Patient presented with acute mid-abdominal pain radiating to back, which has since self-resolved. Sx onset 2 days ago; denies fever, chills, nausea, vomiting and other pertinent symptoms. Patient states being hospitalized for gallstone pancreatitis twice in the last 2-3 months of her pregnancy (- and -); states having similar symptoms with associated nausea and vomiting. Patient is on clear liquid diet, tolerating well. Reports passing flatus, voiding well, and having regular bowel movements. Patient s/p EGD/EUS/ERCP yesterday with removal of sludge & plastic biliary stent placement; feeling well after procedure. Otherwise, no acute complaints. Patient willing to have cholecystectomy and would like to have it as soon as possible as she is a single mother with only 2 months of maternity leave.      REVIEW OF SYSTEMS:  CONSTITUTIONAL: No fever, weight loss, or fatigue  RESPIRATORY: No cough, wheezing, chills or hemoptysis; No shortness of breath  CARDIOVASCULAR: No chest pain, palpitations, dizziness, or leg swelling  GASTROINTESTINAL: (+) abdominal pain. No nausea, vomiting, or hematemesis; No diarrhea or constipation. No melena or hematochezia.  GENITOURINARY: No dysuria or hematuria, urinary frequency  NEUROLOGICAL: No headaches, memory loss, loss of strength, numbness, or tremors  SKIN: No itching, burning, rashes, or lesions     MEDICATIONS  (STANDING):  acetaminophen     Tablet .. 650 milliGRAM(s) Oral every 6 hours  enoxaparin Injectable 40 milliGRAM(s) SubCutaneous every 24 hours  influenza   Vaccine 0.5 milliLiter(s) IntraMuscular once  lactated ringers. 1000 milliLiter(s) (200 mL/Hr) IV Continuous <Continuous>  naloxone Injectable 0.4 milliGRAM(s) IV Push once  pantoprazole    Tablet 40 milliGRAM(s) Oral two times a day    MEDICATIONS  (PRN):  metoclopramide Injectable 10 milliGRAM(s) IV Push once PRN Nausea and/or Vomiting  ondansetron Injectable 4 milliGRAM(s) IV Push every 8 hours PRN Nausea and/or Vomiting  oxyCODONE    IR 5 milliGRAM(s) Oral every 6 hours PRN Severe Pain (7 - 10)  oxyCODONE    IR 2.5 milliGRAM(s) Oral every 6 hours PRN Moderate Pain (4 - 6)      Vital Signs Last 24 Hrs  T(C): 36.9 (27 Dec 2023 04:49), Max: 37.2 (26 Dec 2023 13:19)  T(F): 98.4 (27 Dec 2023 04:49), Max: 99 (26 Dec 2023 13:19)  HR: 52 (27 Dec 2023 04:49) (48 - 59)  BP: 126/84 (27 Dec 2023 04:49) (113/73 - 134/80)  BP(mean): --  RR: 17 (27 Dec 2023 04:49) (12 - 18)  SpO2: 99% (27 Dec 2023 04:49) (96% - 100%)    Parameters below as of 27 Dec 2023 04:49  Patient On (Oxygen Delivery Method): room air        PHYSICAL EXAMINATION:  GENERAL: NAD, lying comfortably; non-toxic appearing  HEAD:  Atraumatic, Normocephalic  EYES:  conjunctiva and sclera clear  NECK: Supple  CHEST/LUNG: breathing unlabored. Clear to auscultation bilaterally. No rales, rhonchi, wheezing, or rubs  HEART: Regular rate and rhythm; No murmurs, rubs, or gallops  ABDOMEN: incision s/p  healing appropriately, no erythema, blistering, or other abnormalities on inspection. Soft, NT/ND. Bowel sounds present.   NERVOUS SYSTEM:  Alert & Oriented X3  : voiding well. No Torres catheter.  EXTREMITIES:  2+ Peripheral Pulses, No clubbing, cyanosis, or edema  SKIN: warm dry                          12.7   8.30  )-----------( 348      ( 27 Dec 2023 06:37 )             36.5         137  |  103  |  11  ----------------------------<  94  3.8   |  27  |  0.74    Ca    9.1      27 Dec 2023 06:37  Phos  4.7       Mg     1.8     12-27    TPro  7.1  /  Alb  3.0<L>  /  TBili  0.4  /  DBili  x   /  AST  28  /  ALT  65<H>  /  AlkPhos  262<H>  12-    LIVER FUNCTIONS - ( 27 Dec 2023 06:37 )  Alb: 3.0 g/dL / Pro: 7.1 g/dL / ALK PHOS: 262 U/L / ALT: 65 U/L DA / AST: 28 U/L / GGT: x             Lipase: >5000: New Lipase reference range as of 2023  New Ranges: 13-75 U/L  Old Range:  U/L U/L (. @ 20:00)    Triglycerides, Serum (. @ 01:25)    Triglycerides, Serum: 187 mg/dL    Sedimentation Rate, Erythrocyte (23 @ 00:20)    Sedimentation Rate, Erythrocyte: 30 mm/Hr      RADIOLOGY & ADDITIONAL TESTS:  < from: CT Abdomen and Pelvis w/ IV Cont (23 @ 21:33) >  INTERPRETATION:  CLINICAL INDICATION: upper abdominal pain, hx of   gallstones, s/p     PROCEDURE:  Helical axial images were obtained from the domes of the diaphragm   through the pubic symphysis following the administration of intravenous   contrast. Coronal and sagittal reformats were also obtained.    CONTRAST/COMPLICATIONS:  IV Contrast: Omnipaque 350  90 cc administered   10 cc discarded  Oral Contrast: NONE  Complications: None reported at time of study completion    COMPARISON: 12/15/2023.    FINDINGS:    LOWER CHEST: Atelectasis.    LIVER: Unremarkable.  BILE DUCTS/GALLBLADDER: Prominent central intrahepatic biliary ducts.   Common bile duct measuring up to 0.8 cm. Cholelithiasis.  PANCREAS: Mildly enlarged pancreas with peripancreatic stranding/free   fluid. Hypodense area at the pancreatic body (2:40), which corresponds to   fat attenuation on thecoronal image.  SPLEEN: Unremarkable.    ADRENALS: Unremarkable.  KIDNEYS/URETERS: Prominent bilateral renal collecting system without   hydronephrosis. Question striated bilateral nephrogram.  BLADDER: Partially distended.  REPRODUCTIVE ORGANS: Enlarged, heterogeneous uterus with contour   deformity, reflecting recent postsurgical/postpartum changes. Nonspecific   complex fluid and foci of air at the endometrial cavity. Small fluid at   the vaginal canal.    BOWEL: No bowel obstruction. Unremarkable appendix.  PERITONEUM: No organized fluid collection or free air.  VESSELS: Normal caliber of the abdominal aorta.  RETROPERITONEUM/LYMPH NODE: No lymphadenopathy.  ABDOMINAL WALL/SOFT TISSUES: Small fat-containing umbilical hernia.   Stranding and foci of air along the anterior pelvic wall soft tissue,   reflecting postsurgical changes.  BONES: Degenerative changes of the spine.    IMPRESSION:    Cholelithiasis. Acute pancreatitis. Small peripancreatic free fluid. No   organized peripancreatic fluid collection.    Prominent central intrahepatic biliary ducts. Common bile ductal   dilatation. Recommend clinical correlation and additional imaging   (MRCP/MR) for evaluation of biliary pathology (choledocholithiasis).    Prominent bilateral renal collecting system without hydronephrosis.   Question striated bilateral nephrogram. Recommend clinical correlation to   assess urinary tract infection (cystitis/pyelonephritis).    Nonspecific complex fluid and foci of air at the endometrial cavity,   reflecting recent postsurgical changes. Recommend clinical correlation to   assess infection/inflammation (endometritis).    Additional findings as described.    --- End of Report ---    < end of copied text >        < from: ERCP w/ EUS (23 @ 15:00) >  ERCP Findings:         film was obtained which was normal. The duodenoscope was advanced into the    second portion of the duodenum. The major papilla was identified which was    mildly erythematous but otherwise normal. A 0.035 inch guidewire was passed into    the biliary tree. The bile duct was deeply cannulated using a Hydratome    sphincterotome. Contrast was injected. There were a few filling defects in the    distal bile duct consistent with sludge. The bile duct was not dilated. The    cystic duct and gallbladder filled. Biliary sphincterotomy was performed using    ERBE cautery. The biliary tree was swept using a 9-12 mm biliary extraction    balloon. Sludge was removed from the bile duct. Final balloon occlusion    cholangiogram showed no residual filling defects. There was mild post    sphincterotomy oozing from the lateral margin of the sphincterotomy site which    was persistent despite balloon tamponade. To facilitate drainage, one 10 Fr by 5    cm plastic biliary stent with one internal flange and one external flange    (Advanix) was placed into the common bile duct. The stent was in good position.    There was excellent drainage of bile and contrast at conclusion. There was no    bleeding after stent placement at conclusion.                EUS Findings:        EGD Findings:        Esophagus: Normal.        Stomach: One 5 mm clean based ulcer in the gastric antrum with multiple    scattered erosions and erythema noted throughout. Biopsies taken with a cold    forceps from the antrum, body, and incisura for histology.        Duodenum: Normal.        EUS Findings:        Biliary tree: There were extensive small hyperechoic, shadowing lesions along    with hyperechoic material in the gallbladder body and neck consistent with    stones. The common bile duct was not dilated but appeared to contain a small    amount of hyperechoic dependent material suspicious for sludge/stone debris.        Ampulla: Normal.        Pancreas: Mild lobularity and hypoechogenicity diffusely consistent with    pancreatitis changes. No pancreatic ductal dilation.        Liver: Normal visualized portions of the left lobe.        Lymph nodes: No lymphadenopathy seen.                Summary:        Biliary sludge. Ductal clearance with biliary sphincterotomy and balloon    extraction. Plastic biliary stent placed to facilitate drainage. One clean based    antral ulcer and erosive gastropathy on preceding EGD exam, biopsied.        Plan:        Return to floor for further management        Advance diet as tolerated        Await pathology        Avoid NSAIDs (Ibuprofen, Motrin, Aleve, Naproxen, Toradol, etc) for 4 weeks        Use Omeprazole 40 mg orally twice daily        Repeat EGD to confirm ulcer healing and ERCP for stent removal in 2-3 months        Surgery consultation for cholecystectomy to preventrecurrence    < end of copied text >             GENERAL SURGERY CONSULTATION NOTE  Patient is a 23 year old female who presents with a chief complaint of epigastric abdominal pain.    23 year old [place]-speaking female with PMHx of cholelithiasis, hepatic steatosis, acute pancreatitis and PSHx of  (2023) s/p  POD#7 admitted for acute pancreatitis. Patient presented with acute mid-abdominal pain radiating to back, which has since self-resolved. Sx onset 2 days ago; denies fever, chills, nausea, vomiting and other pertinent symptoms. Patient states being hospitalized for gallstone pancreatitis twice in the last 2-3 months of her pregnancy (- and -); states having similar symptoms with associated nausea and vomiting. Patient is on clear liquid diet, tolerating well. Reports passing flatus, voiding well, and having regular bowel movements. Patient s/p EGD/EUS/ERCP yesterday with removal of sludge & plastic biliary stent placement; feeling well after procedure. Otherwise, no acute complaints. Patient willing to have cholecystectomy and would like to have it as soon as possible as she is a single mother with only 2 months of maternity leave.   Wolfgang #[id number]    PAST MEDICAL & SURGICAL HISTORY:  Gallstones    Anemia    Pancreatitis    S/P  section            REVIEW OF SYSTEMS:  CONSTITUTIONAL: No fever, weight loss, or fatigue  RESPIRATORY: No cough, wheezing, chills or hemoptysis; No shortness of breath  CARDIOVASCULAR: No chest pain, palpitations, dizziness, or leg swelling  GASTROINTESTINAL: (+) abdominal pain. No nausea, vomiting, or hematemesis; No diarrhea or constipation. No melena or hematochezia.  GENITOURINARY: No dysuria or hematuria, urinary frequency  NEUROLOGICAL: No headaches, memory loss, loss of strength, numbness, or tremors  SKIN: No itching, burning, rashes, or lesions     MEDICATIONS  (STANDING):  acetaminophen     Tablet .. 650 milliGRAM(s) Oral every 6 hours  enoxaparin Injectable 40 milliGRAM(s) SubCutaneous every 24 hours  influenza   Vaccine 0.5 milliLiter(s) IntraMuscular once  lactated ringers. 1000 milliLiter(s) (200 mL/Hr) IV Continuous <Continuous>  naloxone Injectable 0.4 milliGRAM(s) IV Push once  pantoprazole    Tablet 40 milliGRAM(s) Oral two times a day    MEDICATIONS  (PRN):  metoclopramide Injectable 10 milliGRAM(s) IV Push once PRN Nausea and/or Vomiting  ondansetron Injectable 4 milliGRAM(s) IV Push every 8 hours PRN Nausea and/or Vomiting  oxyCODONE    IR 5 milliGRAM(s) Oral every 6 hours PRN Severe Pain (7 - 10)  oxyCODONE    IR 2.5 milliGRAM(s) Oral every 6 hours PRN Moderate Pain (4 - 6)      Vital Signs Last 24 Hrs  T(C): 36.9 (27 Dec 2023 04:49), Max: 37.2 (26 Dec 2023 13:19)  T(F): 98.4 (27 Dec 2023 04:49), Max: 99 (26 Dec 2023 13:19)  HR: 52 (27 Dec 2023 04:49) (48 - 59)  BP: 126/84 (27 Dec 2023 04:49) (113/73 - 134/80)  BP(mean): --  RR: 17 (27 Dec 2023 04:49) (12 - 18)  SpO2: 99% (27 Dec 2023 04:49) (96% - 100%)    Parameters below as of 27 Dec 2023 04:49  Patient On (Oxygen Delivery Method): room air        PHYSICAL EXAMINATION:  GENERAL: NAD, lying comfortably; non-toxic appearing  HEAD:  Atraumatic, Normocephalic  EYES:  conjunctiva and sclera clear  NECK: Supple  CHEST/LUNG: breathing unlabored. Clear to auscultation bilaterally. No rales, rhonchi, wheezing, or rubs  HEART: Regular rate and rhythm; No murmurs, rubs, or gallops  ABDOMEN: incision s/p  healing appropriately, no erythema, blistering, or other abnormalities on inspection. Soft, NT/ND. Bowel sounds present.   NERVOUS SYSTEM:  Alert & Oriented X3  : voiding well. No Torres catheter.  EXTREMITIES:  2+ Peripheral Pulses, No clubbing, cyanosis, or edema  SKIN: warm dry                          12.7   8.30  )-----------( 348      ( 27 Dec 2023 06:37 )             36.5     12    137  |  103  |  11  ----------------------------<  94  3.8   |  27  |  0.74    Ca    9.1      27 Dec 2023 06:37  Phos  4.7       Mg     1.8         TPro  7.1  /  Alb  3.0<L>  /  TBili  0.4  /  DBili  x   /  AST  28  /  ALT  65<H>  /  AlkPhos  262<H>      LIVER FUNCTIONS - ( 27 Dec 2023 06:37 )  Alb: 3.0 g/dL / Pro: 7.1 g/dL / ALK PHOS: 262 U/L / ALT: 65 U/L DA / AST: 28 U/L / GGT: x             Lipase: >5000: New Lipase reference range as of 2023  New Ranges: 13-75 U/L  Old Range:  U/L U/L (.25.23 @ 20:00)    Triglycerides, Serum (..23 @ 01:25)    Triglycerides, Serum: 187 mg/dL    Sedimentation Rate, Erythrocyte (.. @ 00:20)    Sedimentation Rate, Erythrocyte: 30 mm/Hr      RADIOLOGY & ADDITIONAL TESTS:  < from: CT Abdomen and Pelvis w/ IV Cont (. @ 21:33) >  INTERPRETATION:  CLINICAL INDICATION: upper abdominal pain, hx of   gallstones, s/p     PROCEDURE:  Helical axial images were obtained from the domes of the diaphragm   through the pubic symphysis following the administration of intravenous   contrast. Coronal and sagittal reformats were also obtained.    CONTRAST/COMPLICATIONS:  IV Contrast: Omnipaque 350  90 cc administered   10 cc discarded  Oral Contrast: NONE  Complications: None reported at time of study completion    COMPARISON: 12/15/2023.    FINDINGS:    LOWER CHEST: Atelectasis.    LIVER: Unremarkable.  BILE DUCTS/GALLBLADDER: Prominent central intrahepatic biliary ducts.   Common bile duct measuring up to 0.8 cm. Cholelithiasis.  PANCREAS: Mildly enlarged pancreas with peripancreatic stranding/free   fluid. Hypodense area at the pancreatic body (2:40), which corresponds to   fat attenuation on thecoronal image.  SPLEEN: Unremarkable.    ADRENALS: Unremarkable.  KIDNEYS/URETERS: Prominent bilateral renal collecting system without   hydronephrosis. Question striated bilateral nephrogram.  BLADDER: Partially distended.  REPRODUCTIVE ORGANS: Enlarged, heterogeneous uterus with contour   deformity, reflecting recent postsurgical/postpartum changes. Nonspecific   complex fluid and foci of air at the endometrial cavity. Small fluid at   the vaginal canal.    BOWEL: No bowel obstruction. Unremarkable appendix.  PERITONEUM: No organized fluid collection or free air.  VESSELS: Normal caliber of the abdominal aorta.  RETROPERITONEUM/LYMPH NODE: No lymphadenopathy.  ABDOMINAL WALL/SOFT TISSUES: Small fat-containing umbilical hernia.   Stranding and foci of air along the anterior pelvic wall soft tissue,   reflecting postsurgical changes.  BONES: Degenerative changes of the spine.    IMPRESSION:    Cholelithiasis. Acute pancreatitis. Small peripancreatic free fluid. No   organized peripancreatic fluid collection.    Prominent central intrahepatic biliary ducts. Common bile ductal   dilatation. Recommend clinical correlation and additional imaging   (MRCP/MR) for evaluation of biliary pathology (choledocholithiasis).    Prominent bilateral renal collecting system without hydronephrosis.   Question striated bilateral nephrogram. Recommend clinical correlation to   assess urinary tract infection (cystitis/pyelonephritis).    Nonspecific complex fluid and foci of air at the endometrial cavity,   reflecting recent postsurgical changes. Recommend clinical correlation to   assess infection/inflammation (endometritis).    Additional findings as described.    --- End of Report ---    < end of copied text >        < from: ERCP w/ EUS (23 @ 15:00) >  ERCP Findings:         film was obtained which was normal. The duodenoscope was advanced into the    second portion of the duodenum. The major papilla was identified which was    mildly erythematous but otherwise normal. A 0.035 inch guidewire was passed into    the biliary tree. The bile duct was deeply cannulated using a Hydratome    sphincterotome. Contrast was injected. There were a few filling defects in the    distal bile duct consistent with sludge. The bile duct was not dilated. The    cystic duct and gallbladder filled. Biliary sphincterotomy was performed using    ERBE cautery. The biliary tree was swept using a 9-12 mm biliary extraction    balloon. Sludge was removed from the bile duct. Final balloon occlusion    cholangiogram showed no residual filling defects. There was mild post    sphincterotomy oozing from the lateral margin of the sphincterotomy site which    was persistent despite balloon tamponade. To facilitate drainage, one 10 Fr by 5    cm plastic biliary stent with one internal flange and one external flange    (Advanix) was placed into the common bile duct. The stent was in good position.    There was excellent drainage of bile and contrast at conclusion. There was no    bleeding after stent placement at conclusion.                EUS Findings:        EGD Findings:        Esophagus: Normal.        Stomach: One 5 mm clean based ulcer in the gastric antrum with multiple    scattered erosions and erythema noted throughout. Biopsies taken with a cold    forceps from the antrum, body, and incisura for histology.        Duodenum: Normal.        EUS Findings:        Biliary tree: There were extensive small hyperechoic, shadowing lesions along    with hyperechoic material in the gallbladder body and neck consistent with    stones. The common bile duct was not dilated but appeared to contain a small    amount of hyperechoic dependent material suspicious for sludge/stone debris.        Ampulla: Normal.        Pancreas: Mild lobularity and hypoechogenicity diffusely consistent with    pancreatitis changes. No pancreatic ductal dilation.        Liver: Normal visualized portions of the left lobe.        Lymph nodes: No lymphadenopathy seen.                Summary:        Biliary sludge. Ductal clearance with biliary sphincterotomy and balloon    extraction. Plastic biliary stent placed to facilitate drainage. One clean based    antral ulcer and erosive gastropathy on preceding EGD exam, biopsied.        Plan:        Return to floor for further management        Advance diet as tolerated        Await pathology        Avoid NSAIDs (Ibuprofen, Motrin, Aleve, Naproxen, Toradol, etc) for 4 weeks        Use Omeprazole 40 mg orally twice daily        Repeat EGD to confirm ulcer healing and ERCP for stent removal in 2-3 months        Surgery consultation for cholecystectomy to preventrecurrence    < end of copied text >             GENERAL SURGERY CONSULTATION NOTE  Patient is a 23 year old female who presents with a chief complaint of epigastric abdominal pain.    23 year old [place] Islander-speaking female with PMHx of cholelithiasis, hepatic steatosis, acute pancreatitis and PSHx of  (2023) s/p  POD#7 admitted for acute pancreatitis. Patient presented with acute mid-abdominal pain radiating to back, which has since self-resolved. Sx onset 2 days ago; denies fever, chills, nausea, vomiting and other pertinent symptoms. Patient states being hospitalized for gallstone pancreatitis twice in the last 2-3 months of her pregnancy (- and -); states having similar symptoms with associated nausea and vomiting. Patient is on clear liquid diet, tolerating well. Reports passing flatus, voiding well, and having regular bowel movements. Patient s/p EGD/EUS/ERCP yesterday with removal of sludge & plastic biliary stent placement; feeling well after procedure. Otherwise, no acute complaints. Patient willing to have cholecystectomy and would like to have it as soon as possible as she is a single mother with only 2 months of maternity leave.   Wolfgang #[id number]    PAST MEDICAL & SURGICAL HISTORY:  Gallstones    Anemia    Pancreatitis    S/P  section            REVIEW OF SYSTEMS:  CONSTITUTIONAL: No fever, weight loss, or fatigue  RESPIRATORY: No cough, wheezing, chills or hemoptysis; No shortness of breath  CARDIOVASCULAR: No chest pain, palpitations, dizziness, or leg swelling  GASTROINTESTINAL: (+) abdominal pain. No nausea, vomiting, or hematemesis; No diarrhea or constipation. No melena or hematochezia.  GENITOURINARY: No dysuria or hematuria, urinary frequency  NEUROLOGICAL: No headaches, memory loss, loss of strength, numbness, or tremors  SKIN: No itching, burning, rashes, or lesions     MEDICATIONS  (STANDING):  acetaminophen     Tablet .. 650 milliGRAM(s) Oral every 6 hours  enoxaparin Injectable 40 milliGRAM(s) SubCutaneous every 24 hours  influenza   Vaccine 0.5 milliLiter(s) IntraMuscular once  lactated ringers. 1000 milliLiter(s) (200 mL/Hr) IV Continuous <Continuous>  naloxone Injectable 0.4 milliGRAM(s) IV Push once  pantoprazole    Tablet 40 milliGRAM(s) Oral two times a day    MEDICATIONS  (PRN):  metoclopramide Injectable 10 milliGRAM(s) IV Push once PRN Nausea and/or Vomiting  ondansetron Injectable 4 milliGRAM(s) IV Push every 8 hours PRN Nausea and/or Vomiting  oxyCODONE    IR 5 milliGRAM(s) Oral every 6 hours PRN Severe Pain (7 - 10)  oxyCODONE    IR 2.5 milliGRAM(s) Oral every 6 hours PRN Moderate Pain (4 - 6)      Vital Signs Last 24 Hrs  T(C): 36.9 (27 Dec 2023 04:49), Max: 37.2 (26 Dec 2023 13:19)  T(F): 98.4 (27 Dec 2023 04:49), Max: 99 (26 Dec 2023 13:19)  HR: 52 (27 Dec 2023 04:49) (48 - 59)  BP: 126/84 (27 Dec 2023 04:49) (113/73 - 134/80)  BP(mean): --  RR: 17 (27 Dec 2023 04:49) (12 - 18)  SpO2: 99% (27 Dec 2023 04:49) (96% - 100%)    Parameters below as of 27 Dec 2023 04:49  Patient On (Oxygen Delivery Method): room air        PHYSICAL EXAMINATION:  GENERAL: NAD, lying comfortably; non-toxic appearing  HEAD:  Atraumatic, Normocephalic  EYES:  conjunctiva and sclera clear  NECK: Supple  CHEST/LUNG: breathing unlabored. Clear to auscultation bilaterally. No rales, rhonchi, wheezing, or rubs  HEART: Regular rate and rhythm; No murmurs, rubs, or gallops  ABDOMEN: incision s/p  healing appropriately, no erythema, blistering, or other abnormalities on inspection. Soft, NT/ND. Bowel sounds present.   NERVOUS SYSTEM:  Alert & Oriented X3  : voiding well. No Torres catheter.  EXTREMITIES:  2+ Peripheral Pulses, No clubbing, cyanosis, or edema  SKIN: warm dry                          12.7   8.30  )-----------( 348      ( 27 Dec 2023 06:37 )             36.5     12    137  |  103  |  11  ----------------------------<  94  3.8   |  27  |  0.74    Ca    9.1      27 Dec 2023 06:37  Phos  4.7       Mg     1.8         TPro  7.1  /  Alb  3.0<L>  /  TBili  0.4  /  DBili  x   /  AST  28  /  ALT  65<H>  /  AlkPhos  262<H>      LIVER FUNCTIONS - ( 27 Dec 2023 06:37 )  Alb: 3.0 g/dL / Pro: 7.1 g/dL / ALK PHOS: 262 U/L / ALT: 65 U/L DA / AST: 28 U/L / GGT: x             Lipase: >5000: New Lipase reference range as of 2023  New Ranges: 13-75 U/L  Old Range:  U/L U/L (.25.23 @ 20:00)    Triglycerides, Serum (..23 @ 01:25)    Triglycerides, Serum: 187 mg/dL    Sedimentation Rate, Erythrocyte (.. @ 00:20)    Sedimentation Rate, Erythrocyte: 30 mm/Hr      RADIOLOGY & ADDITIONAL TESTS:  < from: CT Abdomen and Pelvis w/ IV Cont (. @ 21:33) >  INTERPRETATION:  CLINICAL INDICATION: upper abdominal pain, hx of   gallstones, s/p     PROCEDURE:  Helical axial images were obtained from the domes of the diaphragm   through the pubic symphysis following the administration of intravenous   contrast. Coronal and sagittal reformats were also obtained.    CONTRAST/COMPLICATIONS:  IV Contrast: Omnipaque 350  90 cc administered   10 cc discarded  Oral Contrast: NONE  Complications: None reported at time of study completion    COMPARISON: 12/15/2023.    FINDINGS:    LOWER CHEST: Atelectasis.    LIVER: Unremarkable.  BILE DUCTS/GALLBLADDER: Prominent central intrahepatic biliary ducts.   Common bile duct measuring up to 0.8 cm. Cholelithiasis.  PANCREAS: Mildly enlarged pancreas with peripancreatic stranding/free   fluid. Hypodense area at the pancreatic body (2:40), which corresponds to   fat attenuation on thecoronal image.  SPLEEN: Unremarkable.    ADRENALS: Unremarkable.  KIDNEYS/URETERS: Prominent bilateral renal collecting system without   hydronephrosis. Question striated bilateral nephrogram.  BLADDER: Partially distended.  REPRODUCTIVE ORGANS: Enlarged, heterogeneous uterus with contour   deformity, reflecting recent postsurgical/postpartum changes. Nonspecific   complex fluid and foci of air at the endometrial cavity. Small fluid at   the vaginal canal.    BOWEL: No bowel obstruction. Unremarkable appendix.  PERITONEUM: No organized fluid collection or free air.  VESSELS: Normal caliber of the abdominal aorta.  RETROPERITONEUM/LYMPH NODE: No lymphadenopathy.  ABDOMINAL WALL/SOFT TISSUES: Small fat-containing umbilical hernia.   Stranding and foci of air along the anterior pelvic wall soft tissue,   reflecting postsurgical changes.  BONES: Degenerative changes of the spine.    IMPRESSION:    Cholelithiasis. Acute pancreatitis. Small peripancreatic free fluid. No   organized peripancreatic fluid collection.    Prominent central intrahepatic biliary ducts. Common bile ductal   dilatation. Recommend clinical correlation and additional imaging   (MRCP/MR) for evaluation of biliary pathology (choledocholithiasis).    Prominent bilateral renal collecting system without hydronephrosis.   Question striated bilateral nephrogram. Recommend clinical correlation to   assess urinary tract infection (cystitis/pyelonephritis).    Nonspecific complex fluid and foci of air at the endometrial cavity,   reflecting recent postsurgical changes. Recommend clinical correlation to   assess infection/inflammation (endometritis).    Additional findings as described.    --- End of Report ---    < end of copied text >        < from: ERCP w/ EUS (23 @ 15:00) >  ERCP Findings:         film was obtained which was normal. The duodenoscope was advanced into the    second portion of the duodenum. The major papilla was identified which was    mildly erythematous but otherwise normal. A 0.035 inch guidewire was passed into    the biliary tree. The bile duct was deeply cannulated using a Hydratome    sphincterotome. Contrast was injected. There were a few filling defects in the    distal bile duct consistent with sludge. The bile duct was not dilated. The    cystic duct and gallbladder filled. Biliary sphincterotomy was performed using    ERBE cautery. The biliary tree was swept using a 9-12 mm biliary extraction    balloon. Sludge was removed from the bile duct. Final balloon occlusion    cholangiogram showed no residual filling defects. There was mild post    sphincterotomy oozing from the lateral margin of the sphincterotomy site which    was persistent despite balloon tamponade. To facilitate drainage, one 10 Fr by 5    cm plastic biliary stent with one internal flange and one external flange    (Advanix) was placed into the common bile duct. The stent was in good position.    There was excellent drainage of bile and contrast at conclusion. There was no    bleeding after stent placement at conclusion.                EUS Findings:        EGD Findings:        Esophagus: Normal.        Stomach: One 5 mm clean based ulcer in the gastric antrum with multiple    scattered erosions and erythema noted throughout. Biopsies taken with a cold    forceps from the antrum, body, and incisura for histology.        Duodenum: Normal.        EUS Findings:        Biliary tree: There were extensive small hyperechoic, shadowing lesions along    with hyperechoic material in the gallbladder body and neck consistent with    stones. The common bile duct was not dilated but appeared to contain a small    amount of hyperechoic dependent material suspicious for sludge/stone debris.        Ampulla: Normal.        Pancreas: Mild lobularity and hypoechogenicity diffusely consistent with    pancreatitis changes. No pancreatic ductal dilation.        Liver: Normal visualized portions of the left lobe.        Lymph nodes: No lymphadenopathy seen.                Summary:        Biliary sludge. Ductal clearance with biliary sphincterotomy and balloon    extraction. Plastic biliary stent placed to facilitate drainage. One clean based    antral ulcer and erosive gastropathy on preceding EGD exam, biopsied.        Plan:        Return to floor for further management        Advance diet as tolerated        Await pathology        Avoid NSAIDs (Ibuprofen, Motrin, Aleve, Naproxen, Toradol, etc) for 4 weeks        Use Omeprazole 40 mg orally twice daily        Repeat EGD to confirm ulcer healing and ERCP for stent removal in 2-3 months        Surgery consultation for cholecystectomy to preventrecurrence    < end of copied text >             GENERAL SURGERY CONSULTATION NOTE  Patient is a 23 year old female who presents with a chief complaint of epigastric abdominal pain.    HPI: 23 year old [place]ian-speaking female with PMHx of cholelithiasis, hepatic steatosis, acute pancreatitis and PSHx of  (2023) s/p  POD#7 admitted for acute pancreatitis. Patient presented with acute mid-abdominal pain radiating to back, which has since self-resolved. Sx onset 2 days ago; denies fever, chills, nausea, vomiting and other pertinent symptoms. Patient states being hospitalized for gallstone pancreatitis twice in the last 2-3 months of her pregnancy (- and -); states having similar symptoms with associated nausea and vomiting. Patient is on clear liquid diet, tolerating well. Reports passing flatus, voiding well, and having regular bowel movements. Patient s/p EGD/EUS/ERCP yesterday with removal of sludge & plastic biliary stent placement; feeling well after procedure. Otherwise, no acute complaints. Patient willing to have cholecystectomy and would like to have it as soon as possible as she is a single mother with only 2 months of maternity leave.   Wolfgang #[id number]    PAST MEDICAL & SURGICAL HISTORY:  Gallstones  Anemia  Pancreatitis  S/P  section    REVIEW OF SYSTEMS:  CONSTITUTIONAL: No fever, weight loss, or fatigue  RESPIRATORY: No cough, wheezing, chills or hemoptysis; No shortness of breath  CARDIOVASCULAR: No chest pain, palpitations, dizziness, or leg swelling  GASTROINTESTINAL: (+) abdominal pain. No nausea, vomiting, or hematemesis; No diarrhea or constipation. No melena or hematochezia.  GENITOURINARY: No dysuria or hematuria, urinary frequency  NEUROLOGICAL: No headaches, memory loss, loss of strength, numbness, or tremors  SKIN: No itching, burning, rashes, or lesions     MEDICATIONS  (STANDING):  acetaminophen     Tablet .. 650 milliGRAM(s) Oral every 6 hours  enoxaparin Injectable 40 milliGRAM(s) SubCutaneous every 24 hours  influenza   Vaccine 0.5 milliLiter(s) IntraMuscular once  lactated ringers. 1000 milliLiter(s) (200 mL/Hr) IV Continuous <Continuous>  naloxone Injectable 0.4 milliGRAM(s) IV Push once  pantoprazole    Tablet 40 milliGRAM(s) Oral two times a day    MEDICATIONS  (PRN):  metoclopramide Injectable 10 milliGRAM(s) IV Push once PRN Nausea and/or Vomiting  ondansetron Injectable 4 milliGRAM(s) IV Push every 8 hours PRN Nausea and/or Vomiting  oxyCODONE    IR 5 milliGRAM(s) Oral every 6 hours PRN Severe Pain (7 - 10)  oxyCODONE    IR 2.5 milliGRAM(s) Oral every 6 hours PRN Moderate Pain (4 - 6)    Vital Signs Last 24 Hrs  T(C): 36.9 (27 Dec 2023 04:49), Max: 37.2 (26 Dec 2023 13:19)  T(F): 98.4 (27 Dec 2023 04:49), Max: 99 (26 Dec 2023 13:19)  HR: 52 (27 Dec 2023 04:49) (48 - 59)  BP: 126/84 (27 Dec 2023 04:49) (113/73 - 134/80)  RR: 17 (27 Dec 2023 04:49) (12 - 18)  SpO2: 99% (27 Dec 2023 04:49) (96% - 100%)    Parameters below as of 27 Dec 2023 04:49  Patient On (Oxygen Delivery Method): room air      PHYSICAL EXAMINATION:  GENERAL: NAD, lying comfortably; non-toxic appearing  HEAD:  Atraumatic, Normocephalic  EYES:  conjunctiva and sclera clear  NECK: Supple  CHEST/LUNG: breathing unlabored.  HEART: Regular rate and rhythm  ABDOMEN: incision s/p  healing appropriately, no erythema, blistering, or other abnormalities on inspection. Soft, NT/ND  NERVOUS SYSTEM:  Alert & Oriented X3  : voiding well. No Torres catheter.  EXTREMITIES:  2+ Peripheral Pulses, No clubbing, cyanosis, or edema  SKIN: warm dry    LABS:                      12.7   8.30  )-----------( 348      ( 27 Dec 2023 06:37 )             36.5       137  |  103  |  11  ----------------------------<  94  3.8   |  27  |  0.74    Ca    9.1      27 Dec 2023 06:37  Phos  4.7       Mg     1.8         TPro  7.1  /  Alb  3.0<L>  /  TBili  0.4  /  DBili  x   /  AST  28  /  ALT  65<H>  /  AlkPhos  262<H>  12-27    LIVER FUNCTIONS - ( 27 Dec 2023 06:37 )  Alb: 3.0 g/dL / Pro: 7.1 g/dL / ALK PHOS: 262 U/L / ALT: 65 U/L DA / AST: 28 U/L / GGT: x           Lipase: >5000: New Lipase reference range as of 2023  New Ranges: 13-75 U/L  Old Range:  U/L U/L (. @ 20:00)    Triglycerides, Serum (. @ 01:25)    Triglycerides, Serum: 187 mg/dL    Sedimentation Rate, Erythrocyte (23 @ 00:20)    Sedimentation Rate, Erythrocyte: 30 mm/Hr    RADIOLOGY & ADDITIONAL TESTS:  < from: CT Abdomen and Pelvis w/ IV Cont (23 @ 21:33) >  INTERPRETATION:  CLINICAL INDICATION: upper abdominal pain, hx of   gallstones, s/p     PROCEDURE:  Helical axial images were obtained from the domes of the diaphragm   through the pubic symphysis following the administration of intravenous   contrast. Coronal and sagittal reformats were also obtained.    CONTRAST/COMPLICATIONS:  IV Contrast: Omnipaque 350  90 cc administered   10 cc discarded  Oral Contrast: NONE  Complications: None reported at time of study completion    COMPARISON: 12/15/2023.    FINDINGS:    LOWER CHEST: Atelectasis.    LIVER: Unremarkable.  BILE DUCTS/GALLBLADDER: Prominent central intrahepatic biliary ducts.   Common bile duct measuring up to 0.8 cm. Cholelithiasis.  PANCREAS: Mildly enlarged pancreas with peripancreatic stranding/free   fluid. Hypodense area at the pancreatic body (2:40), which corresponds to   fat attenuation on thecoronal image.  SPLEEN: Unremarkable.    ADRENALS: Unremarkable.  KIDNEYS/URETERS: Prominent bilateral renal collecting system without   hydronephrosis. Question striated bilateral nephrogram.  BLADDER: Partially distended.  REPRODUCTIVE ORGANS: Enlarged, heterogeneous uterus with contour   deformity, reflecting recent postsurgical/postpartum changes. Nonspecific   complex fluid and foci of air at the endometrial cavity. Small fluid at   the vaginal canal.    BOWEL: No bowel obstruction. Unremarkable appendix.  PERITONEUM: No organized fluid collection or free air.  VESSELS: Normal caliber of the abdominal aorta.  RETROPERITONEUM/LYMPH NODE: No lymphadenopathy.  ABDOMINAL WALL/SOFT TISSUES: Small fat-containing umbilical hernia.   Stranding and foci of air along the anterior pelvic wall soft tissue,   reflecting postsurgical changes.  BONES: Degenerative changes of the spine.    IMPRESSION:  Cholelithiasis. Acute pancreatitis. Small peripancreatic free fluid. No   organized peripancreatic fluid collection.  Prominent central intrahepatic biliary ducts. Common bile ductal   dilatation. Recommend clinical correlation and additional imaging   (MRCP/MR) for evaluation of biliary pathology (choledocholithiasis).  Prominent bilateral renal collecting system without hydronephrosis.   Question striated bilateral nephrogram. Recommend clinical correlation to   assess urinary tract infection (cystitis/pyelonephritis).  Nonspecific complex fluid and foci of air at the endometrial cavity,   reflecting recent postsurgical changes. Recommend clinical correlation to   assess infection/inflammation (endometritis).  Additional findings as described.  --- End of Report ---  < end of copied text >      < from: ERCP w/ EUS (23 @ 15:00) >  ERCP Findings:   film was obtained which was normal. The duodenoscope was advanced into the  second portion of the duodenum. The major papilla was identified which was  mildly erythematous but otherwise normal. A 0.035 inch guidewire was passed into  the biliary tree. The bile duct was deeply cannulated using a Hydratome  sphincterotome. Contrast was injected. There were a few filling defects in the  distal bile duct consistent with sludge. The bile duct was not dilated. The  cystic duct and gallbladder filled. Biliary sphincterotomy was performed using  ERBE cautery. The biliary tree was swept using a 9-12 mm biliary extraction  balloon. Sludge was removed from the bile duct. Final balloon occlusion  cholangiogram showed no residual filling defects. There was mild post  sphincterotomy oozing from the lateral margin of the sphincterotomy site which  was persistent despite balloon tamponade. To facilitate drainage, one 10 Fr by 5  cm plastic biliary stent with one internal flange and one external flange  (Advanix) was placed into the common bile duct. The stent was in good position.  There was excellent drainage of bile and contrast at conclusion. There was no  bleeding after stent placement at conclusion.    EUS Findings:  EGD Findings:  Esophagus: Normal.  Stomach: One 5 mm clean based ulcer in the gastric antrum with multiple  scattered erosions and erythema noted throughout. Biopsies taken with a cold  forceps from the antrum, body, and incisura for histology.  Duodenum: Normal.    EUS Findings:  Biliary tree: There were extensive small hyperechoic, shadowing lesions along  with hyperechoic material in the gallbladder body and neck consistent with  stones. The common bile duct was not dilated but appeared to contain a small  amount of hyperechoic dependent material suspicious for sludge/stone debris.  Ampulla: Normal.  Pancreas: Mild lobularity and hypoechogenicity diffusely consistent with  pancreatitis changes. No pancreatic ductal dilation.  Liver: Normal visualized portions of the left lobe.  Lymph nodes: No lymphadenopathy seen.    Summary:  Biliary sludge. Ductal clearance with biliary sphincterotomy and balloon  extraction. Plastic biliary stent placed to facilitate drainage. One clean based  antral ulcer and erosive gastropathy on preceding EGD exam, biopsied.    Plan:  Return to floor for further management  Advance diet as tolerated  Await pathology  Avoid NSAIDs (Ibuprofen, Motrin, Aleve, Naproxen, Toradol, etc) for 4 weeks  Use Omeprazole 40 mg orally twice daily  Repeat EGD to confirm ulcer healing and ERCP for stent removal in 2-3 months  Surgery consultation for cholecystectomy to prevent recurrence  < end of copied text >       GENERAL SURGERY CONSULTATION NOTE  Patient is a 23 year old female who presents with a chief complaint of epigastric abdominal pain.    HPI: 23 year old [place]onian-speaking female with PMHx of cholelithiasis, hepatic steatosis, acute pancreatitis and PSHx of  (2023) s/p  POD#7 admitted for acute pancreatitis. Patient presented with acute mid-abdominal pain radiating to back, which has since self-resolved. Sx onset 2 days ago; denies fever, chills, nausea, vomiting and other pertinent symptoms. Patient states being hospitalized for gallstone pancreatitis twice in the last 2-3 months of her pregnancy (- and -); states having similar symptoms with associated nausea and vomiting. Patient is on clear liquid diet, tolerating well. Reports passing flatus, voiding well, and having regular bowel movements. Patient s/p EGD/EUS/ERCP yesterday with removal of sludge & plastic biliary stent placement; feeling well after procedure. Otherwise, no acute complaints. Patient willing to have cholecystectomy and would like to have it as soon as possible as she is a single mother with only 2 months of maternity leave.   Wolfgang #[id number]    PAST MEDICAL & SURGICAL HISTORY:  Gallstones  Anemia  Pancreatitis  S/P  section    REVIEW OF SYSTEMS:  CONSTITUTIONAL: No fever, weight loss, or fatigue  RESPIRATORY: No cough, wheezing, chills or hemoptysis; No shortness of breath  CARDIOVASCULAR: No chest pain, palpitations, dizziness, or leg swelling  GASTROINTESTINAL: (+) abdominal pain. No nausea, vomiting, or hematemesis; No diarrhea or constipation. No melena or hematochezia.  GENITOURINARY: No dysuria or hematuria, urinary frequency  NEUROLOGICAL: No headaches, memory loss, loss of strength, numbness, or tremors  SKIN: No itching, burning, rashes, or lesions     MEDICATIONS  (STANDING):  acetaminophen     Tablet .. 650 milliGRAM(s) Oral every 6 hours  enoxaparin Injectable 40 milliGRAM(s) SubCutaneous every 24 hours  influenza   Vaccine 0.5 milliLiter(s) IntraMuscular once  lactated ringers. 1000 milliLiter(s) (200 mL/Hr) IV Continuous <Continuous>  naloxone Injectable 0.4 milliGRAM(s) IV Push once  pantoprazole    Tablet 40 milliGRAM(s) Oral two times a day    MEDICATIONS  (PRN):  metoclopramide Injectable 10 milliGRAM(s) IV Push once PRN Nausea and/or Vomiting  ondansetron Injectable 4 milliGRAM(s) IV Push every 8 hours PRN Nausea and/or Vomiting  oxyCODONE    IR 5 milliGRAM(s) Oral every 6 hours PRN Severe Pain (7 - 10)  oxyCODONE    IR 2.5 milliGRAM(s) Oral every 6 hours PRN Moderate Pain (4 - 6)    Vital Signs Last 24 Hrs  T(C): 36.9 (27 Dec 2023 04:49), Max: 37.2 (26 Dec 2023 13:19)  T(F): 98.4 (27 Dec 2023 04:49), Max: 99 (26 Dec 2023 13:19)  HR: 52 (27 Dec 2023 04:49) (48 - 59)  BP: 126/84 (27 Dec 2023 04:49) (113/73 - 134/80)  RR: 17 (27 Dec 2023 04:49) (12 - 18)  SpO2: 99% (27 Dec 2023 04:49) (96% - 100%)    Parameters below as of 27 Dec 2023 04:49  Patient On (Oxygen Delivery Method): room air      PHYSICAL EXAMINATION:  GENERAL: NAD, lying comfortably; non-toxic appearing  HEAD:  Atraumatic, Normocephalic  EYES:  conjunctiva and sclera clear  NECK: Supple  CHEST/LUNG: breathing unlabored.  HEART: Regular rate and rhythm  ABDOMEN: incision s/p  healing appropriately, no erythema, blistering, or other abnormalities on inspection. Soft, NT/ND  NERVOUS SYSTEM:  Alert & Oriented X3  : voiding well. No Torres catheter.  EXTREMITIES:  2+ Peripheral Pulses, No clubbing, cyanosis, or edema  SKIN: warm dry    LABS:                      12.7   8.30  )-----------( 348      ( 27 Dec 2023 06:37 )             36.5       137  |  103  |  11  ----------------------------<  94  3.8   |  27  |  0.74    Ca    9.1      27 Dec 2023 06:37  Phos  4.7       Mg     1.8         TPro  7.1  /  Alb  3.0<L>  /  TBili  0.4  /  DBili  x   /  AST  28  /  ALT  65<H>  /  AlkPhos  262<H>  12-27    LIVER FUNCTIONS - ( 27 Dec 2023 06:37 )  Alb: 3.0 g/dL / Pro: 7.1 g/dL / ALK PHOS: 262 U/L / ALT: 65 U/L DA / AST: 28 U/L / GGT: x           Lipase: >5000: New Lipase reference range as of 2023  New Ranges: 13-75 U/L  Old Range:  U/L U/L (. @ 20:00)    Triglycerides, Serum (. @ 01:25)    Triglycerides, Serum: 187 mg/dL    Sedimentation Rate, Erythrocyte (23 @ 00:20)    Sedimentation Rate, Erythrocyte: 30 mm/Hr    RADIOLOGY & ADDITIONAL TESTS:  < from: CT Abdomen and Pelvis w/ IV Cont (23 @ 21:33) >  INTERPRETATION:  CLINICAL INDICATION: upper abdominal pain, hx of   gallstones, s/p     PROCEDURE:  Helical axial images were obtained from the domes of the diaphragm   through the pubic symphysis following the administration of intravenous   contrast. Coronal and sagittal reformats were also obtained.    CONTRAST/COMPLICATIONS:  IV Contrast: Omnipaque 350  90 cc administered   10 cc discarded  Oral Contrast: NONE  Complications: None reported at time of study completion    COMPARISON: 12/15/2023.    FINDINGS:    LOWER CHEST: Atelectasis.    LIVER: Unremarkable.  BILE DUCTS/GALLBLADDER: Prominent central intrahepatic biliary ducts.   Common bile duct measuring up to 0.8 cm. Cholelithiasis.  PANCREAS: Mildly enlarged pancreas with peripancreatic stranding/free   fluid. Hypodense area at the pancreatic body (2:40), which corresponds to   fat attenuation on thecoronal image.  SPLEEN: Unremarkable.    ADRENALS: Unremarkable.  KIDNEYS/URETERS: Prominent bilateral renal collecting system without   hydronephrosis. Question striated bilateral nephrogram.  BLADDER: Partially distended.  REPRODUCTIVE ORGANS: Enlarged, heterogeneous uterus with contour   deformity, reflecting recent postsurgical/postpartum changes. Nonspecific   complex fluid and foci of air at the endometrial cavity. Small fluid at   the vaginal canal.    BOWEL: No bowel obstruction. Unremarkable appendix.  PERITONEUM: No organized fluid collection or free air.  VESSELS: Normal caliber of the abdominal aorta.  RETROPERITONEUM/LYMPH NODE: No lymphadenopathy.  ABDOMINAL WALL/SOFT TISSUES: Small fat-containing umbilical hernia.   Stranding and foci of air along the anterior pelvic wall soft tissue,   reflecting postsurgical changes.  BONES: Degenerative changes of the spine.    IMPRESSION:  Cholelithiasis. Acute pancreatitis. Small peripancreatic free fluid. No   organized peripancreatic fluid collection.  Prominent central intrahepatic biliary ducts. Common bile ductal   dilatation. Recommend clinical correlation and additional imaging   (MRCP/MR) for evaluation of biliary pathology (choledocholithiasis).  Prominent bilateral renal collecting system without hydronephrosis.   Question striated bilateral nephrogram. Recommend clinical correlation to   assess urinary tract infection (cystitis/pyelonephritis).  Nonspecific complex fluid and foci of air at the endometrial cavity,   reflecting recent postsurgical changes. Recommend clinical correlation to   assess infection/inflammation (endometritis).  Additional findings as described.  --- End of Report ---  < end of copied text >      < from: ERCP w/ EUS (23 @ 15:00) >  ERCP Findings:   film was obtained which was normal. The duodenoscope was advanced into the  second portion of the duodenum. The major papilla was identified which was  mildly erythematous but otherwise normal. A 0.035 inch guidewire was passed into  the biliary tree. The bile duct was deeply cannulated using a Hydratome  sphincterotome. Contrast was injected. There were a few filling defects in the  distal bile duct consistent with sludge. The bile duct was not dilated. The  cystic duct and gallbladder filled. Biliary sphincterotomy was performed using  ERBE cautery. The biliary tree was swept using a 9-12 mm biliary extraction  balloon. Sludge was removed from the bile duct. Final balloon occlusion  cholangiogram showed no residual filling defects. There was mild post  sphincterotomy oozing from the lateral margin of the sphincterotomy site which  was persistent despite balloon tamponade. To facilitate drainage, one 10 Fr by 5  cm plastic biliary stent with one internal flange and one external flange  (Advanix) was placed into the common bile duct. The stent was in good position.  There was excellent drainage of bile and contrast at conclusion. There was no  bleeding after stent placement at conclusion.    EUS Findings:  EGD Findings:  Esophagus: Normal.  Stomach: One 5 mm clean based ulcer in the gastric antrum with multiple  scattered erosions and erythema noted throughout. Biopsies taken with a cold  forceps from the antrum, body, and incisura for histology.  Duodenum: Normal.    EUS Findings:  Biliary tree: There were extensive small hyperechoic, shadowing lesions along  with hyperechoic material in the gallbladder body and neck consistent with  stones. The common bile duct was not dilated but appeared to contain a small  amount of hyperechoic dependent material suspicious for sludge/stone debris.  Ampulla: Normal.  Pancreas: Mild lobularity and hypoechogenicity diffusely consistent with  pancreatitis changes. No pancreatic ductal dilation.  Liver: Normal visualized portions of the left lobe.  Lymph nodes: No lymphadenopathy seen.    Summary:  Biliary sludge. Ductal clearance with biliary sphincterotomy and balloon  extraction. Plastic biliary stent placed to facilitate drainage. One clean based  antral ulcer and erosive gastropathy on preceding EGD exam, biopsied.    Plan:  Return to floor for further management  Advance diet as tolerated  Await pathology  Avoid NSAIDs (Ibuprofen, Motrin, Aleve, Naproxen, Toradol, etc) for 4 weeks  Use Omeprazole 40 mg orally twice daily  Repeat EGD to confirm ulcer healing and ERCP for stent removal in 2-3 months  Surgery consultation for cholecystectomy to prevent recurrence  < end of copied text >

## 2023-12-28 DIAGNOSIS — K80.20 CALCULUS OF GALLBLADDER WITHOUT CHOLECYSTITIS WITHOUT OBSTRUCTION: ICD-10-CM

## 2023-12-28 DIAGNOSIS — D64.9 ANEMIA, UNSPECIFIED: ICD-10-CM

## 2023-12-28 DIAGNOSIS — O48.0 POST-TERM PREGNANCY: ICD-10-CM

## 2023-12-28 DIAGNOSIS — O99.613 DISEASES OF THE DIGESTIVE SYSTEM COMPLICATING PREGNANCY, THIRD TRIMESTER: ICD-10-CM

## 2023-12-28 DIAGNOSIS — Z3A.40 40 WEEKS GESTATION OF PREGNANCY: ICD-10-CM

## 2023-12-28 DIAGNOSIS — O99.013 ANEMIA COMPLICATING PREGNANCY, THIRD TRIMESTER: ICD-10-CM

## 2023-12-28 LAB
-  AMOXICILLIN/CLAVULANIC ACID: SIGNIFICANT CHANGE UP
-  AMOXICILLIN/CLAVULANIC ACID: SIGNIFICANT CHANGE UP
-  AMPICILLIN/SULBACTAM: SIGNIFICANT CHANGE UP
-  AMPICILLIN/SULBACTAM: SIGNIFICANT CHANGE UP
-  AMPICILLIN: SIGNIFICANT CHANGE UP
-  AMPICILLIN: SIGNIFICANT CHANGE UP
-  AZTREONAM: SIGNIFICANT CHANGE UP
-  AZTREONAM: SIGNIFICANT CHANGE UP
-  CEFAZOLIN: SIGNIFICANT CHANGE UP
-  CEFAZOLIN: SIGNIFICANT CHANGE UP
-  CEFEPIME: SIGNIFICANT CHANGE UP
-  CEFEPIME: SIGNIFICANT CHANGE UP
-  CEFOXITIN: SIGNIFICANT CHANGE UP
-  CEFOXITIN: SIGNIFICANT CHANGE UP
-  CEFTRIAXONE: SIGNIFICANT CHANGE UP
-  CEFTRIAXONE: SIGNIFICANT CHANGE UP
-  CEFUROXIME: SIGNIFICANT CHANGE UP
-  CEFUROXIME: SIGNIFICANT CHANGE UP
-  CIPROFLOXACIN: SIGNIFICANT CHANGE UP
-  CIPROFLOXACIN: SIGNIFICANT CHANGE UP
-  ERTAPENEM: SIGNIFICANT CHANGE UP
-  ERTAPENEM: SIGNIFICANT CHANGE UP
-  GENTAMICIN: SIGNIFICANT CHANGE UP
-  GENTAMICIN: SIGNIFICANT CHANGE UP
-  IMIPENEM: SIGNIFICANT CHANGE UP
-  IMIPENEM: SIGNIFICANT CHANGE UP
-  LEVOFLOXACIN: SIGNIFICANT CHANGE UP
-  LEVOFLOXACIN: SIGNIFICANT CHANGE UP
-  MEROPENEM: SIGNIFICANT CHANGE UP
-  MEROPENEM: SIGNIFICANT CHANGE UP
-  NITROFURANTOIN: SIGNIFICANT CHANGE UP
-  NITROFURANTOIN: SIGNIFICANT CHANGE UP
-  PIPERACILLIN/TAZOBACTAM: SIGNIFICANT CHANGE UP
-  PIPERACILLIN/TAZOBACTAM: SIGNIFICANT CHANGE UP
-  TOBRAMYCIN: SIGNIFICANT CHANGE UP
-  TOBRAMYCIN: SIGNIFICANT CHANGE UP
-  TRIMETHOPRIM/SULFAMETHOXAZOLE: SIGNIFICANT CHANGE UP
-  TRIMETHOPRIM/SULFAMETHOXAZOLE: SIGNIFICANT CHANGE UP
CULTURE RESULTS: ABNORMAL
CULTURE RESULTS: ABNORMAL
METHOD TYPE: SIGNIFICANT CHANGE UP
METHOD TYPE: SIGNIFICANT CHANGE UP
ORGANISM # SPEC MICROSCOPIC CNT: ABNORMAL
SPECIMEN SOURCE: SIGNIFICANT CHANGE UP
SPECIMEN SOURCE: SIGNIFICANT CHANGE UP
SURGICAL PATHOLOGY STUDY: SIGNIFICANT CHANGE UP
SURGICAL PATHOLOGY STUDY: SIGNIFICANT CHANGE UP

## 2023-12-28 RX ORDER — PANTOPRAZOLE SODIUM 20 MG/1
1 TABLET, DELAYED RELEASE ORAL
Qty: 60 | Refills: 0
Start: 2023-12-28 | End: 2024-01-26

## 2024-01-03 PROBLEM — Z00.00 ENCOUNTER FOR PREVENTIVE HEALTH EXAMINATION: Status: ACTIVE | Noted: 2024-01-03

## 2024-01-09 PROBLEM — K85.90 ACUTE PANCREATITIS WITHOUT NECROSIS OR INFECTION, UNSPECIFIED: Chronic | Status: ACTIVE | Noted: 2023-12-26

## 2024-01-11 ENCOUNTER — APPOINTMENT (OUTPATIENT)
Dept: GASTROENTEROLOGY | Facility: CLINIC | Age: 24
End: 2024-01-11
Payer: MEDICAID

## 2024-01-11 VITALS
BODY MASS INDEX: 31.37 KG/M2 | HEART RATE: 58 BPM | HEIGHT: 64.57 IN | SYSTOLIC BLOOD PRESSURE: 124 MMHG | DIASTOLIC BLOOD PRESSURE: 74 MMHG | WEIGHT: 186 LBS | TEMPERATURE: 97.3 F | OXYGEN SATURATION: 99 %

## 2024-01-11 DIAGNOSIS — Z78.9 OTHER SPECIFIED HEALTH STATUS: ICD-10-CM

## 2024-01-11 DIAGNOSIS — K80.20 CALCULUS OF GALLBLADDER W/OUT CHOLECYSTITIS W/OUT OBSTRUCTION: ICD-10-CM

## 2024-01-11 DIAGNOSIS — Z83.3 FAMILY HISTORY OF DIABETES MELLITUS: ICD-10-CM

## 2024-01-11 DIAGNOSIS — A04.8 OTHER SPECIFIED BACTERIAL INTESTINAL INFECTIONS: ICD-10-CM

## 2024-01-11 DIAGNOSIS — R10.9 UNSPECIFIED ABDOMINAL PAIN: ICD-10-CM

## 2024-01-11 DIAGNOSIS — K80.50 CALCULUS OF BILE DUCT W/OUT CHOLANGITIS OR CHOLECYSTITIS W/OUT OBSTRUCTION: ICD-10-CM

## 2024-01-11 PROCEDURE — 99213 OFFICE O/P EST LOW 20 MIN: CPT

## 2024-01-11 RX ORDER — PANTOPRAZOLE 20 MG/1
20 TABLET, DELAYED RELEASE ORAL
Refills: 0 | Status: ACTIVE | COMMUNITY

## 2024-01-11 RX ORDER — ACETAMINOPHEN 500 MG
500 TABLET ORAL
Refills: 0 | Status: ACTIVE | COMMUNITY

## 2024-01-11 RX ORDER — DOXYCYCLINE HYCLATE 100 MG/1
100 CAPSULE ORAL
Qty: 28 | Refills: 0 | Status: ACTIVE | COMMUNITY
Start: 2024-01-11 | End: 1900-01-01

## 2024-01-11 RX ORDER — METRONIDAZOLE 500 MG/1
500 TABLET ORAL 3 TIMES DAILY
Qty: 42 | Refills: 0 | Status: ACTIVE | COMMUNITY
Start: 2024-01-11 | End: 1900-01-01

## 2024-01-11 NOTE — PHYSICAL EXAM

## 2024-01-11 NOTE — ASSESSMENT
[FreeTextEntry1] : 23F with previously no sig pmhx presenting after recent hospitalization. Was at [facility] and seen by me for choledocholithiasis. Underwent ERCP with stone removal and stent placement. Seen by surgery for cholecystectomy but deferred to outpatient. Pt reports still intermittent RUQ pain from her cholelithiasis. Denies any other complaints, no n/v/d/c, melena, hematochezia, fever/chills, jaundice, dark urine, or other issues. States has upcoming appointment next month for cholecystectomy.  - Instructed to continue PPI BID and prescribed quadruple therapy for her H. pylori+. -  used, given her insurance, instructed to f/u with [facility] as planned for cholecystectomy and EGD/ERCP for stent removal and check ulcer healing/HP eradication. Patient was given all paperwork from EUS/ERCP and pathology results to bring to her upcoming appointments at [place] next month. Informed stent is only temporary and can cause long term issues if left in place for more than 3-4 months including biliary stricture, stone, infection, etc. She expressed understanding and f/u accordingly. - Instructed on warning signs to go to ER i.e. worsening abd pain, n/v, fever, etc.

## 2024-01-11 NOTE — HISTORY OF PRESENT ILLNESS
[FreeTextEntry1] : 23F with previously no sig pmhx presenting after recent hospitalization. Was at [facility] and seen by me for choledocholithiasis. Underwent ERCP with stone removal and stent placement. Seen by surgery for cholecystectomy but deferred to outpatient. Pt reports still intermittent RUQ pain from her cholelithiasis. Denies any other complaints, no n/v/d/c, melena, hematochezia, fever/chills, jaundice, dark urine, or other issues. States has upcoming appointment next month for cholecystectomy.

## (undated) DEVICE — LINE BREATHE SAMPLNG

## (undated) DEVICE — LABELS BLANK W PEN

## (undated) DEVICE — SOL INJ NS 0.9% 500ML 1-PORT

## (undated) DEVICE — UNDERPAD LINEN SAVER 17 X 24"

## (undated) DEVICE — OMNIPAQUE 300  30ML

## (undated) DEVICE — ELCTR ECG CONDUCTIVE ADHESIVE

## (undated) DEVICE — DRSG CURITY GAUZE SPONGE 4 X 4" 12-PLY NON-STERILE

## (undated) DEVICE — SENSOR O2 FINGER ADULT

## (undated) DEVICE — WARMING BLANKET FULL ADULT

## (undated) DEVICE — CONTAINER FORMALIN 10% 20ML

## (undated) DEVICE — GOWN LG

## (undated) DEVICE — OLYMPUS DISTAL COVER ENDOSCOPE

## (undated) DEVICE — BALLOON US ENDO

## (undated) DEVICE — DVC AUTO CAP RX LOKG

## (undated) DEVICE — TUBING SUCTION NONCONDUCTIVE 6MM X 12FT

## (undated) DEVICE — INJ SYS RAP REFIL

## (undated) DEVICE — SALIVA EJECTOR (BLUE)

## (undated) DEVICE — ANESTHESIA CIRCUIT ADULT HMEF

## (undated) DEVICE — NDL HYPO SAFE 22G X 1" (BLACK)

## (undated) DEVICE — Device

## (undated) DEVICE — SYR LUER LOK 10CC

## (undated) DEVICE — SYR LUER LOK 3CC

## (undated) DEVICE — SOLIDIFIER ISOLYZER 2000 CC

## (undated) DEVICE — BASIN EMESIS 10IN GRADUATED MAUVE

## (undated) DEVICE — TUBING IV SET GRAVITY 1Y 78" MACRO

## (undated) DEVICE — SOL IRR POUR NS 0.9% 500ML

## (undated) DEVICE — TUBING MEDI-VAC W MAXIGRIP CONNECTORS 1/4"X6'

## (undated) DEVICE — DENTURE CUP PINK

## (undated) DEVICE — FORCEP RADIAL JAW 4 W NDL 2.2MM 2.8MM 240CM ORANGE DISP

## (undated) DEVICE — KIT ENDO PROCEDURE CUST W/VLV

## (undated) DEVICE — DRSG BANDAID 0.75X3"

## (undated) DEVICE — CATH IV SAFE BC 22G X 1" (BLUE)

## (undated) DEVICE — PACK IV START WITH CHG

## (undated) DEVICE — DRSG 2X2

## (undated) DEVICE — BITE BLOCK ADULT 20 X 27MM (GREEN)

## (undated) DEVICE — VENODYNE/SCD SLEEVE CALF MEDIUM